# Patient Record
Sex: MALE | Race: WHITE | NOT HISPANIC OR LATINO | Employment: OTHER | URBAN - METROPOLITAN AREA
[De-identification: names, ages, dates, MRNs, and addresses within clinical notes are randomized per-mention and may not be internally consistent; named-entity substitution may affect disease eponyms.]

---

## 2017-01-11 ENCOUNTER — TELEPHONE (OUTPATIENT)
Dept: GASTROENTEROLOGY | Facility: CLINIC | Age: 72
End: 2017-01-11

## 2017-01-11 NOTE — TELEPHONE ENCOUNTER
Called pt and advised per Dr Prakash that the bx from his egd looked ok.  The colon polyps that were removed were not cancerous and were not precancerous.  Advised he wants him to f/u to discuss possibly having a capsule endoscopy to eval the small bowel for gi bleeding.  Pt verb understanding and reports he currently has an appt on 02/22/2017 and is asking if that is soon enough.  Advised would send message to Dr Prakash.

## 2017-01-11 NOTE — TELEPHONE ENCOUNTER
----- Message from Anil Prakash MD sent at 1/4/2017  7:24 AM EST -----  Tell him that the biopsies from his EGD looked OK. The colon polyps that were removed were not cancerous and not precancerous. Make sure that he is scheduled to see me in the office to discuss possibly having a Capsule Endoscopy to evaluate the small bowel for GI bleeding of obscure etiology. thx.

## 2017-01-12 NOTE — TELEPHONE ENCOUNTER
Per Dr Prakash- Seeing him on 2/22/17 is fine since his H/H is going up while on the iron pills. Have him come by the office about one week prior to the 2/22/17 office visit and have him get a CBC done to again compare to the previous CBC. Roro

## 2017-01-12 NOTE — TELEPHONE ENCOUNTER
Called pt and spoke with pt's wife and advised per Dr Prakash that seeing him on 02/22/2017 is fine since his h/h is going up while on the iron pills.  He would like him to come by the office about one wk prior to 02/22 and get a cbc done to again compare to the previous cbc.  Pt's wife verb understanding and states she will have her  call for a lab time.

## 2017-01-18 ENCOUNTER — OFFICE VISIT (OUTPATIENT)
Dept: ORTHOPEDIC SURGERY | Facility: CLINIC | Age: 72
End: 2017-01-18

## 2017-01-18 VITALS — BODY MASS INDEX: 37.47 KG/M2 | HEIGHT: 69 IN | WEIGHT: 253 LBS

## 2017-01-18 DIAGNOSIS — M48.061 SPINAL STENOSIS OF LUMBAR REGION: ICD-10-CM

## 2017-01-18 DIAGNOSIS — M54.5 CHRONIC LOW BACK PAIN, UNSPECIFIED BACK PAIN LATERALITY, WITH SCIATICA PRESENCE UNSPECIFIED: ICD-10-CM

## 2017-01-18 DIAGNOSIS — G89.29 CHRONIC LOW BACK PAIN, UNSPECIFIED BACK PAIN LATERALITY, WITH SCIATICA PRESENCE UNSPECIFIED: ICD-10-CM

## 2017-01-18 DIAGNOSIS — M54.50 LUMBAR SPINE PAIN: Primary | ICD-10-CM

## 2017-01-18 PROCEDURE — 72100 X-RAY EXAM L-S SPINE 2/3 VWS: CPT | Performed by: ORTHOPAEDIC SURGERY

## 2017-01-18 PROCEDURE — 99204 OFFICE O/P NEW MOD 45 MIN: CPT | Performed by: ORTHOPAEDIC SURGERY

## 2017-01-18 NOTE — LETTER
January 18, 2017     Maggy Dinh MD  [address] [address]    Patient: Danyel Dash   YOB: 1945   Date of Visit: 1/18/2017       Dear Dr. Allegra MD:    Thank you for referring Danyel Dash to me for evaluation. Below are the relevant portions of my assessment and plan of care.    If you have questions, please do not hesitate to call me. I look forward to following Danyel along with you.         Sincerely,        Danny Ya MD        CC: No Recipients  Danny Ya MD  1/18/2017  2:39 PM  Signed  New patient or new problem visit    Chief Complaint   Patient presents with   • Lumbar Spine - Establish Care    he complains of chronic back pain which began his back and hip pain still a bit of hip pain but not so much as before moderate aching in these had no treatment    HPI: The above    PFSH: See chart- reviewed    Review of Systems   Constitutional: Negative for chills, fever and unexpected weight change.   HENT: Negative.  Negative for trouble swallowing and voice change.    Eyes: Negative.  Negative for visual disturbance.   Respiratory: Negative.  Negative for cough and shortness of breath.    Cardiovascular: Negative.  Negative for chest pain and leg swelling.   Gastrointestinal: Negative.  Negative for abdominal pain, nausea and vomiting.   Endocrine: Negative.  Negative for cold intolerance and heat intolerance.   Genitourinary: Negative.  Negative for difficulty urinating, frequency and urgency.   Musculoskeletal: Negative.    Skin: Negative.  Negative for rash and wound.   Allergic/Immunologic: Negative.  Negative for immunocompromised state.   Neurological: Negative.  Negative for numbness.   Hematological: Negative.    Psychiatric/Behavioral: Negative for dysphoric mood. The patient is not nervous/anxious.        PE: Constitutional: Vital signs above-noted.  Awake, alert and oriented    Psychiatric: Affect and insight do not appear grossly  disturbed.    Pulmonary: Breathing is unlabored, color is good.    Skin: Warm, dry and normal turgor    Cardiac:  pedal pulses intact.  No edema.    Eyesight and hearing appear adequate for examination purposes      Musculoskeletal:  There is minimal tenderness to percussion and palpation of the spine. Motion appears undisturbed.  Posture is unremarkable to coronal and sagittal inspection.    The skin about the area is intact.  There is no palpable or visible deformity.  There is no local spasm.       Neurologic:   Reflexes are 2+ and symmetrical in the patellae absent in the Achilles.   Motor function is undisturbed in quadriceps, EHL, and gastrocnemius      Sensation appears symmetrically intact to light touch   .  In the bilateral lower extremities there is no evidence of atrophy.   Clonus is absent..  Gait appears undisturbed.  SLR test negative      MEDICAL DECISION MAKING    XRAY: Two-view x-rays of lumbar spine show multilevel degenerative change but well-maintained lordosis and posture in the coronal plane as well.  Some calcification in the abdominal aorta.  Comparison films are unremarkable.    Other: n/a    Impression: lumbar disc degeneration, lumbar stenosis    Plan: MRI then [facility]

## 2017-01-18 NOTE — MR AVS SNAPSHOT
[facility]  [phone]                    Danyel Dash   1/18/2017 1:30 PM   Office Visit    Dept Phone:  [phone]   Encounter #:  [id number]    Provider:  Danny Ya MD   Department:  [facility]                Your Full Care Plan              Your Updated Medication List          This list is accurate as of: 1/18/17  2:29 PM.  Always use your most recent med list.                amLODIPine 10 MG tablet   Commonly known as:  NORVASC   Take 1 tablet by mouth Daily.       aspirin 81 MG EC tablet       Blood Gluc Meter Disp-Strips device   Pt to monitor blood glucose two times daily       buPROPion  MG 24 hr tablet   Commonly known as:  WELLBUTRIN XL   Take 1 tablet by mouth Daily.       Iron (Ferrous Gluconate) 256 (28 FE) MG tablet       lisinopril 10 MG tablet   Commonly known as:  PRINIVIL,ZESTRIL   Take 1 tablet by mouth Daily.       omeprazole 20 MG capsule   Commonly known as:  priLOSEC   Take 1 capsule by mouth Daily.       pioglitazone 45 MG tablet   Commonly known as:  ACTOS   Take 1 tablet by mouth Daily.       tadalafil 20 MG tablet   Commonly known as:  CIALIS   Take 1 tablet by mouth Daily As Needed for erectile dysfunction.       VERAMYST 27.5 MCG/SPRAY nasal spray   Generic drug:  fluticasone       Vitamin B Complex tablet       Vitamin D-3 1000 UNITS capsule               You Were Diagnosed With        Codes Comments    Lumbar spine pain    -  Primary ICD-10-CM: M54.5  ICD-9-CM: 724.2       Instructions     None    Patient Instructions History      Upcoming Appointments     Visit Type Date Time Department    OFFICE VISIT 1/18/2017  1:30 PM [facility]    LAB 2/13/2017 10:00 AM [facility]    FOLLOW UP 2/22/2017  3:00 PM [facility]      MyChart Signup     Our records indicate that you have an active [facility] account.    You can view your  "After Visit Summary by going to [employer] and logging in with your [employer] username and password.  If you don't have a [employer] username and password but a parent or guardian has access to your record, the parent or guardian should login with their own [employer] username and password and access your record to view the After Visit Summary.    If you have questions, you can email [email or web address] or call [phone] to talk to our [employer] staff.  Remember, [employer] is NOT to be used for urgent needs.  For medical emergencies, dial 911.               Other Info from Your Visit           Your Appointments     Feb 13, 2017 10:00 AM EST   Lab with [facility] (--)    [address] [address]   [phone]            Feb 22, 2017  3:00 PM EST   Follow Up with Anil Prakash MD   [facility]STRO[facility] (--)    [address] [place] [address]   [phone]           Arrive 15 minutes prior to appointment.              Allergies     No Known Allergies      Reason for Visit     Lumbar Spine - Establish Care           Vital Signs     Height Weight Body Mass Index Smoking Status          69\" (175.3 cm) 253 lb (115 kg) 37.36 kg/m2 Former Smoker        Problems and Diagnoses Noted     Low back pain    -  Primary        "

## 2017-01-18 NOTE — PROGRESS NOTES
New patient or new problem visit    Chief Complaint   Patient presents with   • Lumbar Spine - Establish Care    he complains of chronic back pain which began his back and hip pain still a bit of hip pain but not so much as before moderate aching in these had no treatment    HPI: The above    PFSH: See chart- reviewed    Review of Systems   Constitutional: Negative for chills, fever and unexpected weight change.   HENT: Negative.  Negative for trouble swallowing and voice change.    Eyes: Negative.  Negative for visual disturbance.   Respiratory: Negative.  Negative for cough and shortness of breath.    Cardiovascular: Negative.  Negative for chest pain and leg swelling.   Gastrointestinal: Negative.  Negative for abdominal pain, nausea and vomiting.   Endocrine: Negative.  Negative for cold intolerance and heat intolerance.   Genitourinary: Negative.  Negative for difficulty urinating, frequency and urgency.   Musculoskeletal: Negative.    Skin: Negative.  Negative for rash and wound.   Allergic/Immunologic: Negative.  Negative for immunocompromised state.   Neurological: Negative.  Negative for numbness.   Hematological: Negative.    Psychiatric/Behavioral: Negative for dysphoric mood. The patient is not nervous/anxious.        PE: Constitutional: Vital signs above-noted.  Awake, alert and oriented    Psychiatric: Affect and insight do not appear grossly disturbed.    Pulmonary: Breathing is unlabored, color is good.    Skin: Warm, dry and normal turgor    Cardiac:  pedal pulses intact.  No edema.    Eyesight and hearing appear adequate for examination purposes      Musculoskeletal:  There is minimal tenderness to percussion and palpation of the spine. Motion appears undisturbed.  Posture is unremarkable to coronal and sagittal inspection.    The skin about the area is intact.  There is no palpable or visible deformity.  There is no local spasm.       Neurologic:   Reflexes are 2+ and symmetrical in the patellae  absent in the Achilles.   Motor function is undisturbed in quadriceps, EHL, and gastrocnemius      Sensation appears symmetrically intact to light touch   .  In the bilateral lower extremities there is no evidence of atrophy.   Clonus is absent..  Gait appears undisturbed.  SLR test negative      MEDICAL DECISION MAKING    XRAY: Two-view x-rays of lumbar spine show multilevel degenerative change but well-maintained lordosis and posture in the coronal plane as well.  Some calcification in the abdominal aorta.  Comparison films are unremarkable.    Other: n/a    Impression: lumbar disc degeneration, lumbar stenosis    Plan: MRI then [facility]

## 2017-02-10 ENCOUNTER — HOSPITAL ENCOUNTER (OUTPATIENT)
Dept: MRI IMAGING | Facility: HOSPITAL | Age: 72
Discharge: HOME | End: 2017-02-10
Attending: ORTHOPAEDIC SURGERY | Admitting: ORTHOPAEDIC SURGERY

## 2017-02-10 DIAGNOSIS — M54.5 CHRONIC LOW BACK PAIN, UNSPECIFIED BACK PAIN LATERALITY, WITH SCIATICA PRESENCE UNSPECIFIED: ICD-10-CM

## 2017-02-10 DIAGNOSIS — G89.29 CHRONIC LOW BACK PAIN, UNSPECIFIED BACK PAIN LATERALITY, WITH SCIATICA PRESENCE UNSPECIFIED: ICD-10-CM

## 2017-02-10 PROCEDURE — 72148 MRI LUMBAR SPINE W/O DYE: CPT

## 2017-02-13 ENCOUNTER — TELEPHONE (OUTPATIENT)
Dept: GASTROENTEROLOGY | Facility: CLINIC | Age: 72
End: 2017-02-13

## 2017-02-13 DIAGNOSIS — D64.9 ANEMIA, UNSPECIFIED TYPE: Primary | ICD-10-CM

## 2017-02-13 LAB
BASOPHILS # BLD AUTO: 0.04 10*3/MM3 (ref 0–0.2)
BASOPHILS NFR BLD AUTO: 0.6 % (ref 0–1.5)
EOSINOPHIL # BLD AUTO: 0.16 10*3/MM3 (ref 0–0.7)
EOSINOPHIL NFR BLD AUTO: 2.5 % (ref 0.3–6.2)
ERYTHROCYTE [DISTWIDTH] IN BLOOD BY AUTOMATED COUNT: 19.5 % (ref 11.5–14.5)
HCT VFR BLD AUTO: 41.3 % (ref 40.4–52.2)
HGB BLD-MCNC: 12.5 G/DL (ref 13.7–17.6)
IMM GRANULOCYTES # BLD: 0.04 10*3/MM3 (ref 0–0.03)
IMM GRANULOCYTES NFR BLD: 0.6 % (ref 0–0.5)
LYMPHOCYTES # BLD AUTO: 1.36 10*3/MM3 (ref 0.9–4.8)
LYMPHOCYTES NFR BLD AUTO: 20.9 % (ref 19.6–45.3)
MCH RBC QN AUTO: 28.2 PG (ref 27–32.7)
MCHC RBC AUTO-ENTMCNC: 30.3 G/DL (ref 32.6–36.4)
MCV RBC AUTO: 93 FL (ref 79.8–96.2)
MONOCYTES # BLD AUTO: 0.48 10*3/MM3 (ref 0.2–1.2)
MONOCYTES NFR BLD AUTO: 7.4 % (ref 5–12)
NEUTROPHILS # BLD AUTO: 4.42 10*3/MM3 (ref 1.9–8.1)
NEUTROPHILS NFR BLD AUTO: 68 % (ref 42.7–76)
PLATELET # BLD AUTO: 406 10*3/MM3 (ref 140–500)
RBC # BLD AUTO: 4.44 10*6/MM3 (ref 4.6–6)
WBC # BLD AUTO: 6.5 10*3/MM3 (ref 4.5–10.7)

## 2017-02-14 NOTE — TELEPHONE ENCOUNTER
Tell him that his CBC looks even better with a Hgb of 12.5. Continue the iron pills. We can talk when I see him in the office. santiago

## 2017-02-14 NOTE — TELEPHONE ENCOUNTER
Call to pt.  Advise per Dr Prakash that his CBC looks even better with a Hgb of 12.5.  Continue the iron pills.  Dr Prakash will talk with pt when seen in office.  Pt verb understanding - has f/u appt 2/22/17.

## 2017-02-17 ENCOUNTER — TELEPHONE (OUTPATIENT)
Dept: ORTHOPEDIC SURGERY | Facility: CLINIC | Age: 72
End: 2017-02-17

## 2017-02-17 NOTE — TELEPHONE ENCOUNTER
----- Message from Danny Ya MD sent at 2/16/2017 10:58 AM EST -----  Let him know the MRI scan of the lumbar spine shows wear and tear changes, and mild nerve compression.  Nothing for which I would recommend surgery but epidural injections can help and we discussed these in the office.  Please schedule

## 2017-02-21 NOTE — TELEPHONE ENCOUNTER
Spoke with pt and he was informed of his results.  He already has a follow up appt scheduled with NITA which he request to keep so he could discuss the results and options with him.

## 2017-02-22 ENCOUNTER — OFFICE VISIT (OUTPATIENT)
Dept: GASTROENTEROLOGY | Facility: CLINIC | Age: 72
End: 2017-02-22

## 2017-02-22 VITALS
DIASTOLIC BLOOD PRESSURE: 60 MMHG | WEIGHT: 252 LBS | BODY MASS INDEX: 37.33 KG/M2 | HEIGHT: 69 IN | SYSTOLIC BLOOD PRESSURE: 132 MMHG

## 2017-02-22 DIAGNOSIS — D50.0 IRON DEFICIENCY ANEMIA DUE TO CHRONIC BLOOD LOSS: ICD-10-CM

## 2017-02-22 DIAGNOSIS — K92.2 GASTROINTESTINAL HEMORRHAGE, UNSPECIFIED GASTROINTESTINAL HEMORRHAGE TYPE: Primary | ICD-10-CM

## 2017-02-22 PROCEDURE — 99213 OFFICE O/P EST LOW 20 MIN: CPT | Performed by: INTERNAL MEDICINE

## 2017-02-22 NOTE — PROGRESS NOTES
Chief Complaint   Patient presents with   • Follow-up     post scope       History of Present Illness:We reviewed the results of the EGD and Colonoscopy done in 12/16 and the labs drawn earlier this month. He takkes the iron gluconate + vitamin C one bid. He feels better. No rectal bleeding or melena. No abdominal or chest pain.  No nausea or vomting. No heartburn. He is on OMeprazole 40 mg/day.  NO diarrhea or constipation. Weight is increasing.     Past Medical History   Diagnosis Date   • Anemia    • Coronary artery disease      rheumatic fever at age 7   • Diabetes mellitus    • Hypertension        Past Surgical History   Procedure Laterality Date   • Knee arthroscopy Right    • Shoulder arthroscopy Right    • Tonsillectomy     • Endoscopy N/A 12/27/2016     Procedure: ESOPHAGOGASTRODUODENOSCOPY WITH BIOPSIES;  Surgeon: Anil Prakash MD;  Location: [facility] ENDOSCOPY;  Service:    • Colonoscopy N/A 12/27/2016     Procedure: COLONOSCOPY TO CECUM AND TERMINAL ILEUM WITH COLD BIOPSY POLYPECTOMIES;  Surgeon: Anil Prakash MD;  Location: [facility] ENDOSCOPY;  Service:          Current Outpatient Prescriptions:   •  amLODIPine (NORVASC) 10 MG tablet, Take 1 tablet by mouth Daily., Disp: 90 tablet, Rfl: 1  •  aspirin 81 MG EC tablet, Take 81 mg by mouth Daily., Disp: , Rfl:   •  B Complex Vitamins (VITAMIN B COMPLEX) tablet, Take 1 tablet by mouth Daily., Disp: , Rfl:   •  Blood Gluc Meter Disp-Strips device, Pt to monitor blood glucose two times daily, Disp: 1 each, Rfl: 0  •  buPROPion XL (WELLBUTRIN XL) 300 MG 24 hr tablet, Take 1 tablet by mouth Daily., Disp: 90 tablet, Rfl: 1  •  Cholecalciferol (VITAMIN D-3) 1000 UNITS capsule, Take 1,000 Units by mouth Daily., Disp: , Rfl:   •  fluticasone (VERAMYST) 27.5 MCG/SPRAY nasal spray, 2 sprays into each nostril Daily., Disp: , Rfl:   •  Iron, Ferrous Gluconate, 256 (28 FE) MG tablet, Take 1 tablet by mouth daily., Disp: , Rfl:   •  lisinopril (PRINIVIL,ZESTRIL) 10 MG tablet,  Take 1 tablet by mouth Daily., Disp: 90 tablet, Rfl: 1  •  omeprazole (priLOSEC) 20 MG capsule, Take 1 capsule by mouth Daily., Disp: 90 capsule, Rfl: 1  •  pioglitazone (ACTOS) 45 MG tablet, Take 1 tablet by mouth Daily., Disp: 90 tablet, Rfl: 1  •  tadalafil (CIALIS) 20 MG tablet, Take 1 tablet by mouth Daily As Needed for erectile dysfunction., Disp: 90 tablet, Rfl: 1    No Known Allergies    Family History   Problem Relation Age of Onset   • Diverticulosis Father        Social History     Social History   • Marital status:      Spouse name: N/A   • Number of children: N/A   • Years of education: N/A     Occupational History   • Not on file.     Social History Main Topics   • Smoking status: Former Smoker   • Smokeless tobacco: Not on file   • Alcohol use No   • Drug use: No   • Sexual activity: Not on file     Other Topics Concern   • Not on file     Social History Narrative       Review of Systems   All other systems reviewed and are negative.      Vitals:    02/22/17 1442   BP: 132/60       Physical Exam   Constitutional: He is oriented to person, place, and time. He appears well-developed and well-nourished. No distress.   HENT:   Head: Normocephalic and atraumatic. Hair is normal.   Right Ear: Hearing, tympanic membrane, external ear and ear canal normal.   Left Ear: Hearing, tympanic membrane, external ear and ear canal normal.   Nose: No sinus tenderness or nasal deformity.   Mouth/Throat: Uvula is midline, oropharynx is clear and moist and mucous membranes are normal. No oral lesions. No uvula swelling.   Eyes: Conjunctivae, EOM and lids are normal. Pupils are equal, round, and reactive to light. Right eye exhibits no discharge. Left eye exhibits no discharge. No scleral icterus. Right eye exhibits normal extraocular motion and no nystagmus. Left eye exhibits normal extraocular motion and no nystagmus.   Neck: Normal range of motion. Neck supple. No JVD present. No thyromegaly present.    Cardiovascular: Normal rate, regular rhythm, normal heart sounds, intact distal pulses and normal pulses.  Exam reveals no gallop.    No murmur heard.  Pulmonary/Chest: Effort normal and breath sounds normal. No respiratory distress. He has no wheezes. He has no rales. He exhibits no tenderness.   Abdominal: Soft. Bowel sounds are normal. He exhibits no distension and no mass. There is no tenderness. There is no guarding. No hernia.   Genitourinary: Rectal exam shows guaiac positive stool.   Genitourinary Comments: NOrmal anorectal exam.   Musculoskeletal: Normal range of motion. He exhibits no edema, tenderness or deformity.   Lymphadenopathy:     He has no cervical adenopathy.   Neurological: He is alert and oriented to person, place, and time. He has normal reflexes. He displays normal reflexes. No cranial nerve deficit. He exhibits normal muscle tone. Coordination normal.   Skin: Skin is warm and dry. No rash noted. He is not diaphoretic.   Psychiatric: He has a normal mood and affect. His behavior is normal. Judgment and thought content normal.   Vitals reviewed.      Danyel was seen today for follow-up.    Diagnoses and all orders for this visit:    Gastrointestinal hemorrhage, unspecified gastrointestinal hemorrhage type  -     Endoscopy, GI With Capsule    Iron deficiency anemia due to chronic blood loss       Assessment:  1) Iron deficiency anemia  2) GERD  3) GI bleeding of obscure etiology. He is still heme positive today on rectal exxam.    REcommendation:  1) Continue the iron and the Omeprazole  2) Lose weight.  3) Let's see if his insurance will pay for a Capsule Endoscopy.  4) Lets get monthly CBC's  5) f/u 8 weeks.       Return in about 8 weeks (around 4/19/2017).

## 2017-02-28 ENCOUNTER — OFFICE VISIT (OUTPATIENT)
Dept: ORTHOPEDIC SURGERY | Facility: CLINIC | Age: 72
End: 2017-02-28

## 2017-02-28 DIAGNOSIS — M48.061 SPINAL STENOSIS OF LUMBAR REGION: Primary | ICD-10-CM

## 2017-02-28 PROCEDURE — 99213 OFFICE O/P EST LOW 20 MIN: CPT | Performed by: ORTHOPAEDIC SURGERY

## 2017-02-28 NOTE — PROGRESS NOTES
He complains of back and right hip pain which is failed to improve conservative care.  It's moderate sometimes severe activity related and did not improve with time and exercises.  No balance difficulties bowel or bladder complaints.  On exam good strength and good sensation intact patellar reflexes absent Achilles reflexes.  I reviewed his MRI scan he has stenotic changes not dramatic but certainly enough to be symptomatic.  We'll schedule epidural injections I will see him back as needed

## 2017-03-22 DIAGNOSIS — D64.9 ANEMIA, UNSPECIFIED TYPE: Primary | ICD-10-CM

## 2017-03-22 LAB
BASOPHILS # BLD AUTO: 0.01 10*3/MM3 (ref 0–0.2)
BASOPHILS NFR BLD AUTO: 0.2 % (ref 0–1.5)
EOSINOPHIL # BLD AUTO: 0.05 10*3/MM3 (ref 0–0.7)
EOSINOPHIL NFR BLD AUTO: 1 % (ref 0.3–6.2)
ERYTHROCYTE [DISTWIDTH] IN BLOOD BY AUTOMATED COUNT: 14.9 % (ref 11.5–14.5)
HCT VFR BLD AUTO: 44 % (ref 40.4–52.2)
HGB BLD-MCNC: 14 G/DL (ref 13.7–17.6)
IMM GRANULOCYTES # BLD: 0 10*3/MM3 (ref 0–0.03)
IMM GRANULOCYTES NFR BLD: 0 % (ref 0–0.5)
LYMPHOCYTES # BLD AUTO: 1.08 10*3/MM3 (ref 0.9–4.8)
LYMPHOCYTES NFR BLD AUTO: 22.4 % (ref 19.6–45.3)
MCH RBC QN AUTO: 29.7 PG (ref 27–32.7)
MCHC RBC AUTO-ENTMCNC: 31.8 G/DL (ref 32.6–36.4)
MCV RBC AUTO: 93.4 FL (ref 79.8–96.2)
MONOCYTES # BLD AUTO: 0.31 10*3/MM3 (ref 0.2–1.2)
MONOCYTES NFR BLD AUTO: 6.4 % (ref 5–12)
NEUTROPHILS # BLD AUTO: 3.37 10*3/MM3 (ref 1.9–8.1)
NEUTROPHILS NFR BLD AUTO: 70 % (ref 42.7–76)
PLATELET # BLD AUTO: 389 10*3/MM3 (ref 140–500)
RBC # BLD AUTO: 4.71 10*6/MM3 (ref 4.6–6)
WBC # BLD AUTO: 4.82 10*3/MM3 (ref 4.5–10.7)

## 2017-03-23 ENCOUNTER — TRANSCRIBE ORDERS (OUTPATIENT)
Dept: PAIN MEDICINE | Facility: HOSPITAL | Age: 72
End: 2017-03-23

## 2017-03-23 ENCOUNTER — HOSPITAL ENCOUNTER (OUTPATIENT)
Dept: PAIN MEDICINE | Facility: HOSPITAL | Age: 72
Discharge: HOME | End: 2017-03-23
Attending: ORTHOPAEDIC SURGERY | Admitting: ORTHOPAEDIC SURGERY

## 2017-03-23 ENCOUNTER — ANESTHESIA (OUTPATIENT)
Dept: PAIN MEDICINE | Facility: HOSPITAL | Age: 72
End: 2017-03-23

## 2017-03-23 ENCOUNTER — HOSPITAL ENCOUNTER (OUTPATIENT)
Dept: GENERAL RADIOLOGY | Facility: HOSPITAL | Age: 72
Discharge: HOME | End: 2017-03-23

## 2017-03-23 ENCOUNTER — ANESTHESIA EVENT (OUTPATIENT)
Dept: PAIN MEDICINE | Facility: HOSPITAL | Age: 72
End: 2017-03-23

## 2017-03-23 VITALS
DIASTOLIC BLOOD PRESSURE: 81 MMHG | SYSTOLIC BLOOD PRESSURE: 146 MMHG | HEIGHT: 69 IN | RESPIRATION RATE: 16 BRPM | TEMPERATURE: 97.8 F | OXYGEN SATURATION: 94 % | HEART RATE: 68 BPM

## 2017-03-23 DIAGNOSIS — R52 PAIN: ICD-10-CM

## 2017-03-23 DIAGNOSIS — M48.061 SPINAL STENOSIS OF LUMBAR REGION: Primary | ICD-10-CM

## 2017-03-23 DIAGNOSIS — M48.061 SPINAL STENOSIS OF LUMBAR REGION: ICD-10-CM

## 2017-03-23 PROCEDURE — 25010000002 METHYLPREDNISOLONE PER 80 MG: Performed by: ANESTHESIOLOGY

## 2017-03-23 PROCEDURE — 77003 FLUOROGUIDE FOR SPINE INJECT: CPT

## 2017-03-23 PROCEDURE — C1755 CATHETER, INTRASPINAL: HCPCS

## 2017-03-23 RX ORDER — LIDOCAINE HYDROCHLORIDE 10 MG/ML
1 INJECTION, SOLUTION INFILTRATION; PERINEURAL ONCE
Status: DISCONTINUED | OUTPATIENT
Start: 2017-03-23 | End: 2017-03-24 | Stop reason: HOSPADM

## 2017-03-23 RX ORDER — FLUTICASONE PROPIONATE 50 MCG
2 SPRAY, SUSPENSION (ML) NASAL DAILY
COMMUNITY
End: 2017-10-06

## 2017-03-23 RX ORDER — FENTANYL CITRATE 50 UG/ML
50 INJECTION, SOLUTION INTRAMUSCULAR; INTRAVENOUS
Status: DISCONTINUED | OUTPATIENT
Start: 2017-03-23 | End: 2017-03-24 | Stop reason: HOSPADM

## 2017-03-23 RX ORDER — BUPIVACAINE HYDROCHLORIDE 2.5 MG/ML
30 INJECTION, SOLUTION EPIDURAL; INFILTRATION; INTRACAUDAL ONCE
Status: DISCONTINUED | OUTPATIENT
Start: 2017-03-23 | End: 2017-03-24 | Stop reason: HOSPADM

## 2017-03-23 RX ORDER — SODIUM CHLORIDE 0.9 % (FLUSH) 0.9 %
1-10 SYRINGE (ML) INJECTION AS NEEDED
Status: DISCONTINUED | OUTPATIENT
Start: 2017-03-23 | End: 2017-03-24 | Stop reason: HOSPADM

## 2017-03-23 RX ORDER — LIDOCAINE HYDROCHLORIDE 10 MG/ML
0.5 INJECTION, SOLUTION INFILTRATION; PERINEURAL ONCE AS NEEDED
Status: CANCELLED | OUTPATIENT
Start: 2017-03-23

## 2017-03-23 RX ORDER — METHYLPREDNISOLONE ACETATE 80 MG/ML
80 INJECTION, SUSPENSION INTRA-ARTICULAR; INTRALESIONAL; INTRAMUSCULAR; SOFT TISSUE ONCE
Status: COMPLETED | OUTPATIENT
Start: 2017-03-23 | End: 2017-03-23

## 2017-03-23 RX ORDER — MIDAZOLAM HYDROCHLORIDE 1 MG/ML
1 INJECTION INTRAMUSCULAR; INTRAVENOUS
Status: DISCONTINUED | OUTPATIENT
Start: 2017-03-23 | End: 2017-03-24 | Stop reason: HOSPADM

## 2017-03-23 RX ADMIN — METHYLPREDNISOLONE ACETATE 80 MG: 80 INJECTION, SUSPENSION INTRA-ARTICULAR; INTRALESIONAL; INTRAMUSCULAR; SOFT TISSUE at 10:28

## 2017-03-23 NOTE — ANESTHESIA PROCEDURE NOTES
PAIN Epidural block    Patient location during procedure: pain clinic  Indication:procedure for pain  Performed By  Anesthesiologist: LUIS EDUARDO WEBSTER  Preanesthetic Checklist  Completed: patient identified, site marked, surgical consent, pre-op evaluation, timeout performed, risks and benefits discussed and monitors and equipment checked  Additional Notes  Depomedrol - 80mg    Needle position confirmed by fluoroscopy; no contrast due to kidney function.    Diagnosis  Post-Op Diagnosis Codes:     * Lumbar radiculopathy (M54.16)     * Lumbar degenerative disc disease (M51.36)    Epidural Block Prep:  Pt Position:prone  Sterile Tech:cap, gloves, mask and sterile barrier  Prep:chlorhexidine gluconate and isopropyl alcohol  Monitoring:blood pressure monitoring, continuous pulse oximetry and EKG  Epidural Block Procedure:  Approach:right paramedian  Guidance: fluoroscopy  Location:lumbar  Level:4-5  Needle Type:Tuohy  Needle Gauge:20  Aspiration:negative  Medications:  Depomedrol:80 mg  Preservative Free Saline:2mL  Isovue:0mL    Post Assessment:  Post-procedure: bandaide.  Pt Tolerance:patient tolerated the procedure well with no apparent complications  Complications:no

## 2017-03-23 NOTE — H&P
[facility]    History and Physical    Patient Name: Danyel Dash  :  1945  MRN:  [id number]  Date of Admission: 3/23/2017    Subjective     Patient is a 71 y.o. male presents with chief complaint of chronic, intermitent, moderate, severe low back and hips: bilateral pain.  Onset of symptoms was gradual starting several months ago.  Symptoms are associated/aggravated by lifting, standing, straining or twisting. Symptoms improve with relaxation    The following portions of the patients history were reviewed and updated as appropriate: current medications, allergies, past medical history, past surgical history, past family history, past social history and problem list                Objective     Past Medical History:   Past Medical History:   Diagnosis Date   • Anemia    • Chronic kidney disease    • Coronary artery disease     rheumatic fever at age 7   • Diabetes mellitus    • Hypertension    • Low back pain      Past Surgical History:   Past Surgical History:   Procedure Laterality Date   • COLONOSCOPY N/A 2016    Procedure: COLONOSCOPY TO CECUM AND TERMINAL ILEUM WITH COLD BIOPSY POLYPECTOMIES;  Surgeon: Anil Prakash MD;  Location: [facility] ENDOSCOPY;  Service:    • ENDOSCOPY N/A 2016    Procedure: ESOPHAGOGASTRODUODENOSCOPY WITH BIOPSIES;  Surgeon: Anil Prakash MD;  Location: [facility] ENDOSCOPY;  Service:    • KNEE ARTHROSCOPY Right    • SHOULDER ARTHROSCOPY Right    • TONSILLECTOMY       Family History:   Family History   Problem Relation Age of Onset   • Diverticulosis Father      Social History:   Social History   Substance Use Topics   • Smoking status: Former Smoker   • Smokeless tobacco: None   • Alcohol use No       Vital Signs Range for the last 24 hours  Temperature: Temp:  [36.6 °C (97.8 °F)] 36.6 °C (97.8 °F)   Temp Source: Temp src: Oral   BP: BP: (182)/(85) 182/85   Pulse: Heart Rate:  [78] 78   Respirations: Resp:  [16] 16   SPO2: SpO2:  [96 %] 96 %   O2 Amount (l/min):     O2  "Devices O2 Device: room air   Weight:       Flowsheet Rows         First Filed Value    Admission Height  69\" (175.3 cm) Documented at 03/23/2017 1000    Admission Weight            --------------------------------------------------------------------------------    Current Outpatient Prescriptions   Medication Sig Dispense Refill   • amLODIPine (NORVASC) 10 MG tablet Take 1 tablet by mouth Daily. 90 tablet 1   • B Complex Vitamins (VITAMIN B COMPLEX) tablet Take 1 tablet by mouth Daily.     • Blood Gluc Meter Disp-Strips device Pt to monitor blood glucose two times daily 1 each 0   • Cholecalciferol (VITAMIN D-3) 1000 UNITS capsule Take 1,000 Units by mouth Daily.     • fluticasone (VERAMYST) 27.5 MCG/SPRAY nasal spray 2 sprays into each nostril Daily.     • Iron, Ferrous Gluconate, 256 (28 FE) MG tablet Take 1 tablet by mouth daily.     • lisinopril (PRINIVIL,ZESTRIL) 10 MG tablet Take 1 tablet by mouth Daily. 90 tablet 1   • omeprazole (priLOSEC) 20 MG capsule Take 1 capsule by mouth Daily. 90 capsule 1   • pioglitazone (ACTOS) 45 MG tablet Take 1 tablet by mouth Daily. 90 tablet 1   • tadalafil (CIALIS) 20 MG tablet Take 1 tablet by mouth Daily As Needed for erectile dysfunction. 90 tablet 1   • aspirin 81 MG EC tablet Take 81 mg by mouth Daily.     • buPROPion XL (WELLBUTRIN XL) 300 MG 24 hr tablet Take 1 tablet by mouth Daily. 90 tablet 1     Current Facility-Administered Medications   Medication Dose Route Frequency Provider Last Rate Last Dose   • bupivacaine PF (MARCAINE) 0.25 % injection 30 mL  30 mL Infiltration Once Ike Montenegro MD       • FentaNYL Citrate (PF) (SUBLIMAZE) injection 50 mcg  50 mcg Intravenous Q5 Min PRN Ike Montenegro MD       • iopamidol (ISOVUE-M 200) injection 41%  2 mL Injection Once in imaging Ike Montenegro MD       • lidocaine (XYLOCAINE) 1 % injection 1 mL  1 mL Intradermal Once Ike Montenegro MD       • methylPREDNISolone acetate (DEPO-medrol) " injection 80 mg  80 mg Intramuscular Once Ike Montenegro MD       • midazolam (VERSED) injection 1 mg  1 mg Intravenous Q5 Min PRN Ike Montenegro MD       • sodium chloride 0.9 % flush 1-10 mL  1-10 mL Intravenous PRN Ike Montenegro MD           --------------------------------------------------------------------------------  Assessment/Plan      Anesthesia Evaluation     Patient summary reviewed and Nursing notes reviewed   no history of anesthetic complications:  NPO Status: > 6 hours   Airway   Mallampati: II  TM distance: >3 FB  Neck ROM: full  Dental - normal exam     Pulmonary - negative pulmonary ROS and normal exam   Cardiovascular - normal exam    (+) hypertension, CAD,       Neuro/Psych- neuro exam normal  GI/Hepatic/Renal/Endo    (+)  GERD, chronic renal disease CRI, diabetes mellitus,     Musculoskeletal     (+) back pain, radiculopathy Left lower extremity and Right lower extremity  Abdominal  - normal exam   Substance History      OB/GYN          Other   (+) arthritis                            Diagnosis and Plan    Treatment Plan  ASA 2      Procedures: Lumbar Epidural Steroid Injection(LESI), With fluoroscopy,       Anesthetic plan and risks discussed with patient.          Diagnosis     * Lumbar radiculopathy [M54.16]     * Lumbar degenerative disc disease [M51.36]

## 2017-03-23 NOTE — PLAN OF CARE
Problem: Pain, Chronic (Adult)  Goal: Identify Related Risk Factors and Signs and Symptoms  Outcome: Unable to achieve outcome(s) by discharge Date Met:  03/23/17

## 2017-03-26 NOTE — PROGRESS NOTES
Tell him that his CBC on 3/22/17 was normal with a Hgb of 14.0 which is good. Let's continue with the plan. santiago

## 2017-03-30 ENCOUNTER — TELEPHONE (OUTPATIENT)
Dept: GASTROENTEROLOGY | Facility: CLINIC | Age: 72
End: 2017-03-30

## 2017-03-30 NOTE — TELEPHONE ENCOUNTER
Call to spouse, Lea (see [employer] auth of 1/18/17).  Advise per Dr Prakash that his CBC on 3/22/17 was  Normal with a Hgb of 14.0, which is good. Continue with the plan.  Spouse verb understanding.

## 2017-03-30 NOTE — TELEPHONE ENCOUNTER
----- Message from Anil Prakash MD sent at 3/26/2017  6:32 PM EDT -----  Tell him that his CBC on 3/22/17 was normal with a Hgb of 14.0 which is good. Let's continue with the plan. santiago

## 2017-04-19 ENCOUNTER — ANESTHESIA EVENT (OUTPATIENT)
Dept: PAIN MEDICINE | Facility: HOSPITAL | Age: 72
End: 2017-04-19

## 2017-04-19 ENCOUNTER — TELEPHONE (OUTPATIENT)
Dept: GASTROENTEROLOGY | Facility: CLINIC | Age: 72
End: 2017-04-19

## 2017-04-19 DIAGNOSIS — D50.9 IRON DEFICIENCY ANEMIA, UNSPECIFIED IRON DEFICIENCY ANEMIA TYPE: Primary | ICD-10-CM

## 2017-04-19 LAB
BASOPHILS # BLD AUTO: 0.03 10*3/MM3 (ref 0–0.2)
BASOPHILS NFR BLD AUTO: 0.5 % (ref 0–1.5)
EOSINOPHIL # BLD AUTO: 0.11 10*3/MM3 (ref 0–0.7)
EOSINOPHIL NFR BLD AUTO: 1.9 % (ref 0.3–6.2)
ERYTHROCYTE [DISTWIDTH] IN BLOOD BY AUTOMATED COUNT: 13.1 % (ref 11.5–14.5)
HCT VFR BLD AUTO: 42.9 % (ref 40.4–52.2)
HGB BLD-MCNC: 14 G/DL (ref 13.7–17.6)
IMM GRANULOCYTES # BLD: 0 10*3/MM3 (ref 0–0.03)
IMM GRANULOCYTES NFR BLD: 0 % (ref 0–0.5)
LYMPHOCYTES # BLD AUTO: 0.93 10*3/MM3 (ref 0.9–4.8)
LYMPHOCYTES NFR BLD AUTO: 15.8 % (ref 19.6–45.3)
MCH RBC QN AUTO: 30.4 PG (ref 27–32.7)
MCHC RBC AUTO-ENTMCNC: 32.6 G/DL (ref 32.6–36.4)
MCV RBC AUTO: 93.3 FL (ref 79.8–96.2)
MONOCYTES # BLD AUTO: 0.41 10*3/MM3 (ref 0.2–1.2)
MONOCYTES NFR BLD AUTO: 6.9 % (ref 5–12)
NEUTROPHILS # BLD AUTO: 4.42 10*3/MM3 (ref 1.9–8.1)
NEUTROPHILS NFR BLD AUTO: 74.9 % (ref 42.7–76)
PLATELET # BLD AUTO: 428 10*3/MM3 (ref 140–500)
RBC # BLD AUTO: 4.6 10*6/MM3 (ref 4.6–6)
WBC # BLD AUTO: 5.9 10*3/MM3 (ref 4.5–10.7)

## 2017-04-19 RX ORDER — LIDOCAINE HYDROCHLORIDE 10 MG/ML
1 INJECTION, SOLUTION INFILTRATION; PERINEURAL ONCE
Status: DISCONTINUED | OUTPATIENT
Start: 2017-04-19 | End: 2017-04-21 | Stop reason: HOSPADM

## 2017-04-19 RX ORDER — SODIUM CHLORIDE 0.9 % (FLUSH) 0.9 %
1-10 SYRINGE (ML) INJECTION AS NEEDED
Status: DISCONTINUED | OUTPATIENT
Start: 2017-04-19 | End: 2017-04-21 | Stop reason: HOSPADM

## 2017-04-19 RX ORDER — BUPIVACAINE HYDROCHLORIDE 2.5 MG/ML
30 INJECTION, SOLUTION EPIDURAL; INFILTRATION; INTRACAUDAL ONCE
Status: DISCONTINUED | OUTPATIENT
Start: 2017-04-19 | End: 2017-04-21 | Stop reason: HOSPADM

## 2017-04-19 RX ORDER — MIDAZOLAM HYDROCHLORIDE 1 MG/ML
1 INJECTION INTRAMUSCULAR; INTRAVENOUS
Status: DISCONTINUED | OUTPATIENT
Start: 2017-04-19 | End: 2017-04-21 | Stop reason: HOSPADM

## 2017-04-19 RX ORDER — METHYLPREDNISOLONE ACETATE 80 MG/ML
80 INJECTION, SUSPENSION INTRA-ARTICULAR; INTRALESIONAL; INTRAMUSCULAR; SOFT TISSUE ONCE
Status: COMPLETED | OUTPATIENT
Start: 2017-04-19 | End: 2017-04-20

## 2017-04-19 RX ORDER — FENTANYL CITRATE 50 UG/ML
50 INJECTION, SOLUTION INTRAMUSCULAR; INTRAVENOUS
Status: DISCONTINUED | OUTPATIENT
Start: 2017-04-19 | End: 2017-04-21 | Stop reason: HOSPADM

## 2017-04-19 NOTE — H&P
INTERVAL HISTORY:    The patient returns for another Lumbar epidural steroid injection 2 today.  They have received 95% improvement since their last injection with a pain level of 4 /10 at its worst recently.    Conservative measures tried in the interim   MP - 2  Lungs - clear  CV - rrr    Diagnosis:Lumbar Spinal Stenosis    Plan:  Lumbar epidural steroid injection under fluoroscopic guidance    I have encouraged them to continue:  1.  Physical therapy exercises at home as prescribed by physical therapy or from the pain clinic handout (given to the patient).  Continuation of these exercises every day, or multiple times per week, even when the patient has good pain relief, was stressed to the patient as a preventative measure to decrease the frequency and severity of future pain episodes.  2.  Continue pain medicines as already prescribed.  If patient not currently taking any, it is recommended to begin Acetaminophen 1000 mg po q 8 hours.  If other medicines containing Acetaminophen are currently prescribed, maintain daily dose at 3000mg.    3.  If they can tolerate NSAIDS, it is recommended to take Ibuprofen 600 mg po q 6 hours for 7 days during pain exacerbations.   Alternatively, they may substitute an NSAID of their choice (e.g. Aleve)  4.  Heat and ice to the affected area as tolerated for pain control.  It was discussed that heating pads can cause burns.  5.  Low impact exercise such as walking or water exercise was recommended to maintain overall health and aid in weight control.   6.  Follow up as needed for subsequent injections.  7.  Patient was counseled to abstain from tobacco products.

## 2017-04-19 NOTE — ANESTHESIA PROCEDURE NOTES
PAIN Epidural block    Patient location during procedure: pain clinic  Start Time: 4/20/2017 8:35 AM  Stop Time: 4/20/2017 8:43 AM  Indication:at surgeon's request and procedure for pain  Performed By  Anesthesiologist: KRISTIN GARCIA  Preanesthetic Checklist  Completed: patient identified, site marked, surgical consent, pre-op evaluation, timeout performed, IV checked, risks and benefits discussed and monitors and equipment checked  Additional Notes  Dx: Lumbar Spinal Stenosis  80 mg depomedrol in epid  Epidural Block Prep:  Pt Position:prone (prone)  Sterile Tech:cap, gloves, mask and sterile barrier  Prep:chlorhexidine gluconate and isopropyl alcohol  Monitoring:blood pressure monitoring, EKG and continuous pulse oximetry  Epidural Block Procedure:  Approach:midline  Guidance: fluoroscopy and c arm pa and lat and loss of resistance  Location:lumbar  Level:4-5 (interlaminar)  Needle Type:Db  Needle Gauge:20  Aspiration:negative  Medications:  Depomedrol:80 mg  Preservative Free Saline:3mL  Comments:No dye due to kidney pathology  Post Assessment:  Post-procedure: bandaid.  Pt Tolerance:patient tolerated the procedure well with no apparent complications  Complications:no

## 2017-04-20 ENCOUNTER — HOSPITAL ENCOUNTER (OUTPATIENT)
Dept: PAIN MEDICINE | Facility: HOSPITAL | Age: 72
Discharge: HOME | End: 2017-04-20
Admitting: ORTHOPAEDIC SURGERY

## 2017-04-20 ENCOUNTER — HOSPITAL ENCOUNTER (OUTPATIENT)
Dept: GENERAL RADIOLOGY | Facility: HOSPITAL | Age: 72
Discharge: HOME | End: 2017-04-20

## 2017-04-20 ENCOUNTER — ANESTHESIA (OUTPATIENT)
Dept: PAIN MEDICINE | Facility: HOSPITAL | Age: 72
End: 2017-04-20

## 2017-04-20 VITALS
SYSTOLIC BLOOD PRESSURE: 139 MMHG | RESPIRATION RATE: 16 BRPM | DIASTOLIC BLOOD PRESSURE: 74 MMHG | OXYGEN SATURATION: 96 % | HEART RATE: 74 BPM | TEMPERATURE: 98.1 F

## 2017-04-20 DIAGNOSIS — R52 PAIN: ICD-10-CM

## 2017-04-20 DIAGNOSIS — M48.061 SPINAL STENOSIS OF LUMBAR REGION: ICD-10-CM

## 2017-04-20 PROCEDURE — C1755 CATHETER, INTRASPINAL: HCPCS

## 2017-04-20 PROCEDURE — 25010000002 METHYLPREDNISOLONE PER 80 MG: Performed by: ANESTHESIOLOGY

## 2017-04-20 PROCEDURE — 77003 FLUOROGUIDE FOR SPINE INJECT: CPT

## 2017-04-20 RX ADMIN — METHYLPREDNISOLONE ACETATE 80 MG: 80 INJECTION, SUSPENSION INTRA-ARTICULAR; INTRALESIONAL; INTRAMUSCULAR; SOFT TISSUE at 08:41

## 2017-04-26 ENCOUNTER — OFFICE VISIT (OUTPATIENT)
Dept: GASTROENTEROLOGY | Facility: CLINIC | Age: 72
End: 2017-04-26

## 2017-04-26 VITALS
HEIGHT: 70 IN | BODY MASS INDEX: 34.01 KG/M2 | WEIGHT: 237.6 LBS | SYSTOLIC BLOOD PRESSURE: 118 MMHG | DIASTOLIC BLOOD PRESSURE: 64 MMHG

## 2017-04-26 DIAGNOSIS — D50.0 IRON DEFICIENCY ANEMIA DUE TO CHRONIC BLOOD LOSS: ICD-10-CM

## 2017-04-26 DIAGNOSIS — K21.00 GASTROESOPHAGEAL REFLUX DISEASE WITH ESOPHAGITIS: ICD-10-CM

## 2017-04-26 DIAGNOSIS — K92.2 GASTROINTESTINAL HEMORRHAGE, UNSPECIFIED GASTROINTESTINAL HEMORRHAGE TYPE: Primary | ICD-10-CM

## 2017-04-26 PROCEDURE — 99213 OFFICE O/P EST LOW 20 MIN: CPT | Performed by: INTERNAL MEDICINE

## 2017-04-26 NOTE — PROGRESS NOTES
Chief Complaint   Patient presents with   • GI Bleeding       History of Present Illness: We reviewed the lab results including the normal CBC done one week ago. His energy is back to normal. He is on iron pills one bid with vitamin C. No rectal bleeding. He occaisonally sees black stool. No abdominal or chest pain. No nausea or vomting. He is on one baby aspirin/day. He has not had a Capsule Endoscopy yet and doesn't know why. He is trying to lose weight and is losing weight at a rate of about one pound/week. No diarrhea, some constipation. We reviewed the results of the 12/16 EGD and Colonoscopy. No abdominal or chest pain.  No SOA. No ETOH x 25 yrs.    Past Medical History:   Diagnosis Date   • Anemia    • Chronic kidney disease    • Coronary artery disease     rheumatic fever at age 7   • Diabetes mellitus    • Hypertension    • Low back pain        Past Surgical History:   Procedure Laterality Date   • COLONOSCOPY N/A 12/27/2016    tics, IH, polyps   • ENDOSCOPY N/A 12/27/2016    LA Grade B reflux esophagitis, erythematous mucosa in stomach   • KNEE ARTHROSCOPY Right    • SHOULDER ARTHROSCOPY Right    • TONSILLECTOMY           Current Outpatient Prescriptions:   •  amLODIPine (NORVASC) 10 MG tablet, Take 1 tablet by mouth Daily., Disp: 90 tablet, Rfl: 1  •  aspirin 81 MG EC tablet, Take 81 mg by mouth Daily., Disp: , Rfl:   •  B Complex Vitamins (VITAMIN B COMPLEX) tablet, Take 1 tablet by mouth Daily., Disp: , Rfl:   •  Blood Gluc Meter Disp-Strips device, Pt to monitor blood glucose two times daily, Disp: 1 each, Rfl: 0  •  buPROPion XL (WELLBUTRIN XL) 300 MG 24 hr tablet, Take 1 tablet by mouth Daily., Disp: 90 tablet, Rfl: 1  •  Cholecalciferol (VITAMIN D-3) 1000 UNITS capsule, Take 1,000 Units by mouth Daily., Disp: , Rfl:   •  fluticasone (FLONASE ALLERGY RELIEF) 50 MCG/ACT nasal spray, 2 sprays into each nostril Daily., Disp: , Rfl:   •  Iron, Ferrous Gluconate, 256 (28 FE) MG tablet, Take 1 tablet by  mouth daily., Disp: , Rfl:   •  lisinopril (PRINIVIL,ZESTRIL) 10 MG tablet, Take 1 tablet by mouth Daily., Disp: 90 tablet, Rfl: 1  •  omeprazole (priLOSEC) 20 MG capsule, Take 1 capsule by mouth Daily., Disp: 90 capsule, Rfl: 1  •  pioglitazone (ACTOS) 45 MG tablet, Take 1 tablet by mouth Daily., Disp: 90 tablet, Rfl: 1  •  tadalafil (CIALIS) 20 MG tablet, Take 1 tablet by mouth Daily As Needed for erectile dysfunction., Disp: 90 tablet, Rfl: 1    No Known Allergies    Family History   Problem Relation Age of Onset   • Diverticulosis Father        Social History     Social History   • Marital status:      Spouse name: N/A   • Number of children: N/A   • Years of education: N/A     Occupational History   • Not on file.     Social History Main Topics   • Smoking status: Former Smoker   • Smokeless tobacco: Not on file   • Alcohol use No   • Drug use: No   • Sexual activity: Not on file     Other Topics Concern   • Not on file     Social History Narrative       Review of Systems   All other systems reviewed and are negative.      Vitals:    04/26/17 1300   BP: 118/64       Physical Exam   Constitutional: He is oriented to person, place, and time. He appears well-developed and well-nourished. No distress.   HENT:   Head: Normocephalic and atraumatic. Hair is normal.   Right Ear: Hearing, tympanic membrane, external ear and ear canal normal.   Left Ear: Hearing, tympanic membrane, external ear and ear canal normal.   Nose: No sinus tenderness or nasal deformity.   Mouth/Throat: Uvula is midline, oropharynx is clear and moist and mucous membranes are normal. No oral lesions. No uvula swelling.   Eyes: Conjunctivae, EOM and lids are normal. Pupils are equal, round, and reactive to light. Right eye exhibits no discharge. Left eye exhibits no discharge. No scleral icterus. Right eye exhibits normal extraocular motion and no nystagmus. Left eye exhibits normal extraocular motion and no nystagmus.   Fundoscopic exam:        The right eye shows red reflex.        The left eye shows red reflex.   Neck: Normal range of motion. Neck supple. No JVD present. No tracheal deviation present. No thyromegaly present.   Cardiovascular: Normal rate, regular rhythm, normal heart sounds, intact distal pulses and normal pulses.  Exam reveals no gallop.    No murmur heard.  Pulmonary/Chest: Effort normal and breath sounds normal. No respiratory distress. He has no wheezes. He has no rales. He exhibits no tenderness.   Abdominal: Soft. Bowel sounds are normal. He exhibits no distension and no mass. There is no tenderness. There is no guarding. No hernia.   Genitourinary: Prostate normal. Rectal exam shows guaiac positive stool.   Musculoskeletal: Normal range of motion. He exhibits no edema, tenderness or deformity.   Lymphadenopathy:     He has no cervical adenopathy.   Neurological: He is alert and oriented to person, place, and time. He has normal reflexes. He displays normal reflexes. No cranial nerve deficit. He exhibits normal muscle tone. Coordination normal.   Skin: Skin is warm and dry. No rash noted. He is not diaphoretic.   Psychiatric: He has a normal mood and affect. His behavior is normal. Judgment and thought content normal.   Nursing note and vitals reviewed.      Danyel was seen today for gi bleeding.    Diagnoses and all orders for this visit:    Gastrointestinal hemorrhage, unspecified gastrointestinal hemorrhage type  -     Endoscopy, GI With Capsule  -     CBC & Differential; Future    Iron deficiency anemia due to chronic blood loss  -     CBC & Differential; Future    Gastroesophageal reflux disease with esophagitis  -     CBC & Differential; Future       Assessment:  1) GI bleeding of obscure etiology.  2) Heme positive stool  3) Iron deficiency anemia  4) GERD    REcommendations:  1) Continue iron pills and vitamin C  2) Continue getting CBC's monthly.  3) Let's get a Capsule Endoscopy.  4) f/u 3 mos.  5) Continue to lose  weight.       No Follow-up on file.

## 2017-05-01 ENCOUNTER — RESULTS ENCOUNTER (OUTPATIENT)
Dept: GASTROENTEROLOGY | Facility: CLINIC | Age: 72
End: 2017-05-01

## 2017-05-01 DIAGNOSIS — K21.00 GASTROESOPHAGEAL REFLUX DISEASE WITH ESOPHAGITIS: ICD-10-CM

## 2017-05-01 DIAGNOSIS — D50.0 IRON DEFICIENCY ANEMIA DUE TO CHRONIC BLOOD LOSS: ICD-10-CM

## 2017-05-01 DIAGNOSIS — K92.2 GASTROINTESTINAL HEMORRHAGE, UNSPECIFIED GASTROINTESTINAL HEMORRHAGE TYPE: ICD-10-CM

## 2017-05-08 ENCOUNTER — TELEPHONE (OUTPATIENT)
Dept: GASTROENTEROLOGY | Facility: CLINIC | Age: 72
End: 2017-05-08

## 2017-05-17 ENCOUNTER — TELEPHONE (OUTPATIENT)
Dept: GASTROENTEROLOGY | Facility: CLINIC | Age: 72
End: 2017-05-17

## 2017-05-26 LAB
BASOPHILS # BLD AUTO: 0.03 10*3/MM3 (ref 0–0.2)
BASOPHILS NFR BLD AUTO: 0.5 % (ref 0–1.5)
EOSINOPHIL # BLD AUTO: 0.14 10*3/MM3 (ref 0–0.7)
EOSINOPHIL NFR BLD AUTO: 2.4 % (ref 0.3–6.2)
ERYTHROCYTE [DISTWIDTH] IN BLOOD BY AUTOMATED COUNT: 14.4 % (ref 11.5–14.5)
HCT VFR BLD AUTO: 42 % (ref 40.4–52.2)
HGB BLD-MCNC: 13.1 G/DL (ref 13.7–17.6)
IMM GRANULOCYTES # BLD: 0.03 10*3/MM3 (ref 0–0.03)
IMM GRANULOCYTES NFR BLD: 0.5 % (ref 0–0.5)
LYMPHOCYTES # BLD AUTO: 1.32 10*3/MM3 (ref 0.9–4.8)
LYMPHOCYTES NFR BLD AUTO: 22.7 % (ref 19.6–45.3)
MCH RBC QN AUTO: 30.3 PG (ref 27–32.7)
MCHC RBC AUTO-ENTMCNC: 31.2 G/DL (ref 32.6–36.4)
MCV RBC AUTO: 97 FL (ref 79.8–96.2)
MONOCYTES # BLD AUTO: 0.39 10*3/MM3 (ref 0.2–1.2)
MONOCYTES NFR BLD AUTO: 6.7 % (ref 5–12)
NEUTROPHILS # BLD AUTO: 3.91 10*3/MM3 (ref 1.9–8.1)
NEUTROPHILS NFR BLD AUTO: 67.2 % (ref 42.7–76)
PLATELET # BLD AUTO: 423 10*3/MM3 (ref 140–500)
RBC # BLD AUTO: 4.33 10*6/MM3 (ref 4.6–6)
WBC # BLD AUTO: 5.82 10*3/MM3 (ref 4.5–10.7)

## 2017-05-31 ENCOUNTER — TELEPHONE (OUTPATIENT)
Dept: GASTROENTEROLOGY | Facility: CLINIC | Age: 72
End: 2017-05-31

## 2017-06-01 ENCOUNTER — APPOINTMENT (OUTPATIENT)
Dept: PAIN MEDICINE | Facility: HOSPITAL | Age: 72
End: 2017-06-01

## 2017-06-15 ENCOUNTER — OFFICE VISIT (OUTPATIENT)
Dept: GASTROENTEROLOGY | Facility: CLINIC | Age: 72
End: 2017-06-15

## 2017-06-15 VITALS
WEIGHT: 239 LBS | BODY MASS INDEX: 35.4 KG/M2 | DIASTOLIC BLOOD PRESSURE: 76 MMHG | HEIGHT: 69 IN | SYSTOLIC BLOOD PRESSURE: 128 MMHG | TEMPERATURE: 98.3 F

## 2017-06-15 DIAGNOSIS — Z13.1 DM (DIABETES MELLITUS SCREEN): ICD-10-CM

## 2017-06-15 DIAGNOSIS — R19.5 OCCULT GI BLEEDING: ICD-10-CM

## 2017-06-15 DIAGNOSIS — N18.9 CRI (CHRONIC RENAL INSUFFICIENCY), UNSPECIFIED STAGE: ICD-10-CM

## 2017-06-15 DIAGNOSIS — D50.0 IRON DEFICIENCY ANEMIA DUE TO CHRONIC BLOOD LOSS: Primary | ICD-10-CM

## 2017-06-15 LAB
ALBUMIN SERPL-MCNC: 4.3 G/DL (ref 3.5–5.2)
ALBUMIN/GLOB SERPL: 2.2 G/DL
ALP SERPL-CCNC: 46 U/L (ref 39–117)
ALT SERPL-CCNC: 17 U/L (ref 1–41)
AST SERPL-CCNC: 14 U/L (ref 1–40)
BASOPHILS # BLD AUTO: 0.03 10*3/MM3 (ref 0–0.2)
BASOPHILS NFR BLD AUTO: 0.5 % (ref 0–1.5)
BILIRUB SERPL-MCNC: 0.4 MG/DL (ref 0.1–1.2)
BUN SERPL-MCNC: 20 MG/DL (ref 8–23)
BUN/CREAT SERPL: 18.5 (ref 7–25)
CALCIUM SERPL-MCNC: 9.3 MG/DL (ref 8.6–10.5)
CHLORIDE SERPL-SCNC: 101 MMOL/L (ref 98–107)
CO2 SERPL-SCNC: 22.8 MMOL/L (ref 22–29)
CREAT SERPL-MCNC: 1.08 MG/DL (ref 0.76–1.27)
EOSINOPHIL # BLD AUTO: 0.16 10*3/MM3 (ref 0–0.7)
EOSINOPHIL NFR BLD AUTO: 2.5 % (ref 0.3–6.2)
ERYTHROCYTE [DISTWIDTH] IN BLOOD BY AUTOMATED COUNT: 15.3 % (ref 11.5–14.5)
FERRITIN SERPL-MCNC: 20.02 NG/ML (ref 30–400)
GLOBULIN SER CALC-MCNC: 2 GM/DL
GLUCOSE SERPL-MCNC: 137 MG/DL (ref 65–99)
HCT VFR BLD AUTO: 36.7 % (ref 40.4–52.2)
HGB BLD-MCNC: 11.6 G/DL (ref 13.7–17.6)
IMM GRANULOCYTES # BLD: 0.02 10*3/MM3 (ref 0–0.03)
IMM GRANULOCYTES NFR BLD: 0.3 % (ref 0–0.5)
IRON SATN MFR SERPL: 60 % (ref 20–50)
IRON SERPL-MCNC: 232 MCG/DL (ref 59–158)
LYMPHOCYTES # BLD AUTO: 1.09 10*3/MM3 (ref 0.9–4.8)
LYMPHOCYTES NFR BLD AUTO: 17 % (ref 19.6–45.3)
MCH RBC QN AUTO: 30.7 PG (ref 27–32.7)
MCHC RBC AUTO-ENTMCNC: 31.6 G/DL (ref 32.6–36.4)
MCV RBC AUTO: 97.1 FL (ref 79.8–96.2)
MONOCYTES # BLD AUTO: 0.52 10*3/MM3 (ref 0.2–1.2)
MONOCYTES NFR BLD AUTO: 8.1 % (ref 5–12)
NEUTROPHILS # BLD AUTO: 4.59 10*3/MM3 (ref 1.9–8.1)
NEUTROPHILS NFR BLD AUTO: 71.6 % (ref 42.7–76)
PLATELET # BLD AUTO: 351 10*3/MM3 (ref 140–500)
POTASSIUM SERPL-SCNC: 4.3 MMOL/L (ref 3.5–5.2)
PROT SERPL-MCNC: 6.3 G/DL (ref 6–8.5)
RBC # BLD AUTO: 3.78 10*6/MM3 (ref 4.6–6)
SODIUM SERPL-SCNC: 140 MMOL/L (ref 136–145)
TIBC SERPL-MCNC: 384 MCG/DL
UIBC SERPL-MCNC: 152 MCG/DL
WBC # BLD AUTO: 6.41 10*3/MM3 (ref 4.5–10.7)

## 2017-06-15 PROCEDURE — 99214 OFFICE O/P EST MOD 30 MIN: CPT | Performed by: INTERNAL MEDICINE

## 2017-06-15 RX ORDER — SODIUM CHLORIDE 0.9 % (FLUSH) 0.9 %
1-10 SYRINGE (ML) INJECTION AS NEEDED
Status: CANCELLED | OUTPATIENT
Start: 2017-06-15

## 2017-06-25 NOTE — PROGRESS NOTES
Tell him that his labs show that he is still anemic with a Hgb of 11.6 and he is iron deficient. Continue taking iron pills three times daily. Why are we waiting to do the EGD with small bowel enteroscopy till 11/16?Continue getting monthly CBC's. Why don't we do the EGD w/ small bowel enterescopy in the next 1-2 mos? santiago

## 2017-06-28 ENCOUNTER — TELEPHONE (OUTPATIENT)
Dept: GASTROENTEROLOGY | Facility: CLINIC | Age: 72
End: 2017-06-28

## 2017-06-28 NOTE — TELEPHONE ENCOUNTER
----- Message from Anil Prakash MD sent at 6/25/2017  3:52 PM EDT -----  Tell him that his labs show that he is still anemic with a Hgb of 11.6 and he is iron deficient. Continue taking iron pills three times daily. Why are we waiting to do the EGD with small bowel enteroscopy till 11/16?Continue getting monthly CBC's. Why don't we do the EGD w/ small bowel enterescopy in the next 1-2 mos? santiago

## 2017-07-03 ENCOUNTER — TELEPHONE (OUTPATIENT)
Dept: GASTROENTEROLOGY | Facility: CLINIC | Age: 72
End: 2017-07-03

## 2017-07-03 NOTE — TELEPHONE ENCOUNTER
Dr Prakash wants EGD with small bowel enteroscopy in 1-2 mo (instead of EGD in November).  Message to Estela MCGRAW

## 2017-07-03 NOTE — TELEPHONE ENCOUNTER
----- Message from Estela Ng sent at 7/3/2017  4:21 PM EDT -----  Regarding: NEW SURGICAL CASE REQUEST    Do EGD with small bowel enteroscopy in next 1-2 mo's PT HAS EGD ORDER AND IS SCHEDULED FOR NOV FOR JUST EGD DOES PT ALSO NEED THE EGD W/SMALL BOWEL ENTEROSCOPY IN AUGUST OR DO WE JUST NEED TO ADD TO THE November ORDER

## 2017-07-07 ENCOUNTER — ANESTHESIA (OUTPATIENT)
Dept: GASTROENTEROLOGY | Facility: HOSPITAL | Age: 72
End: 2017-07-07

## 2017-07-07 ENCOUNTER — HOSPITAL ENCOUNTER (OUTPATIENT)
Facility: HOSPITAL | Age: 72
Setting detail: HOSPITAL OUTPATIENT SURGERY
Discharge: HOME | End: 2017-07-07
Attending: INTERNAL MEDICINE | Admitting: INTERNAL MEDICINE

## 2017-07-07 ENCOUNTER — ANESTHESIA EVENT (OUTPATIENT)
Dept: GASTROENTEROLOGY | Facility: HOSPITAL | Age: 72
End: 2017-07-07

## 2017-07-07 VITALS
HEART RATE: 64 BPM | SYSTOLIC BLOOD PRESSURE: 124 MMHG | HEIGHT: 69 IN | WEIGHT: 241.7 LBS | TEMPERATURE: 98.1 F | BODY MASS INDEX: 35.8 KG/M2 | RESPIRATION RATE: 14 BRPM | DIASTOLIC BLOOD PRESSURE: 80 MMHG | OXYGEN SATURATION: 95 %

## 2017-07-07 DIAGNOSIS — D50.0 IRON DEFICIENCY ANEMIA DUE TO CHRONIC BLOOD LOSS: ICD-10-CM

## 2017-07-07 LAB
BASOPHILS # BLD AUTO: 0.03 10*3/MM3 (ref 0–0.2)
BASOPHILS NFR BLD AUTO: 0.4 % (ref 0–1.5)
DEPRECATED RDW RBC AUTO: 49.8 FL (ref 37–54)
EOSINOPHIL # BLD AUTO: 0.11 10*3/MM3 (ref 0–0.7)
EOSINOPHIL NFR BLD AUTO: 1.6 % (ref 0.3–6.2)
ERYTHROCYTE [DISTWIDTH] IN BLOOD BY AUTOMATED COUNT: 14.1 % (ref 11.5–14.5)
FERRITIN SERPL-MCNC: 22.51 NG/ML (ref 30–400)
GLUCOSE BLDC GLUCOMTR-MCNC: 112 MG/DL (ref 70–130)
HCT VFR BLD AUTO: 38.6 % (ref 40.4–52.2)
HGB BLD-MCNC: 12.6 G/DL (ref 13.7–17.6)
IMM GRANULOCYTES # BLD: 0.05 10*3/MM3 (ref 0–0.03)
IMM GRANULOCYTES NFR BLD: 0.7 % (ref 0–0.5)
LYMPHOCYTES # BLD AUTO: 1.07 10*3/MM3 (ref 0.9–4.8)
LYMPHOCYTES NFR BLD AUTO: 15.9 % (ref 19.6–45.3)
MCH RBC QN AUTO: 31.7 PG (ref 27–32.7)
MCHC RBC AUTO-ENTMCNC: 32.6 G/DL (ref 32.6–36.4)
MCV RBC AUTO: 97 FL (ref 79.8–96.2)
MONOCYTES # BLD AUTO: 0.26 10*3/MM3 (ref 0.2–1.2)
MONOCYTES NFR BLD AUTO: 3.9 % (ref 5–12)
NEUTROPHILS # BLD AUTO: 5.19 10*3/MM3 (ref 1.9–8.1)
NEUTROPHILS NFR BLD AUTO: 77.5 % (ref 42.7–76)
PLATELET # BLD AUTO: 305 10*3/MM3 (ref 140–500)
PMV BLD AUTO: 9.3 FL (ref 6–12)
RBC # BLD AUTO: 3.98 10*6/MM3 (ref 4.6–6)
WBC NRBC COR # BLD: 6.71 10*3/MM3 (ref 4.5–10.7)

## 2017-07-07 PROCEDURE — 82962 GLUCOSE BLOOD TEST: CPT

## 2017-07-07 PROCEDURE — 25010000002 PROPOFOL 10 MG/ML EMULSION: Performed by: ANESTHESIOLOGY

## 2017-07-07 PROCEDURE — 85025 COMPLETE CBC W/AUTO DIFF WBC: CPT | Performed by: INTERNAL MEDICINE

## 2017-07-07 PROCEDURE — 82728 ASSAY OF FERRITIN: CPT | Performed by: INTERNAL MEDICINE

## 2017-07-07 PROCEDURE — 25010000002 GLUCAGON (HUMAN RECOMBINANT) 1 MG RECONSTITUTED SOLUTION: Performed by: INTERNAL MEDICINE

## 2017-07-07 RX ORDER — SODIUM CHLORIDE, SODIUM LACTATE, POTASSIUM CHLORIDE, CALCIUM CHLORIDE 600; 310; 30; 20 MG/100ML; MG/100ML; MG/100ML; MG/100ML
30 INJECTION, SOLUTION INTRAVENOUS CONTINUOUS
Status: DISCONTINUED | OUTPATIENT
Start: 2017-07-07 | End: 2017-07-07 | Stop reason: HOSPADM

## 2017-07-07 RX ORDER — PROPOFOL 10 MG/ML
VIAL (ML) INTRAVENOUS AS NEEDED
Status: DISCONTINUED | OUTPATIENT
Start: 2017-07-07 | End: 2017-07-07 | Stop reason: SURG

## 2017-07-07 RX ORDER — LIDOCAINE HYDROCHLORIDE 20 MG/ML
INJECTION, SOLUTION INFILTRATION; PERINEURAL AS NEEDED
Status: DISCONTINUED | OUTPATIENT
Start: 2017-07-07 | End: 2017-07-07 | Stop reason: SURG

## 2017-07-07 RX ORDER — SODIUM CHLORIDE 0.9 % (FLUSH) 0.9 %
1-10 SYRINGE (ML) INJECTION AS NEEDED
Status: DISCONTINUED | OUTPATIENT
Start: 2017-07-07 | End: 2017-07-07 | Stop reason: HOSPADM

## 2017-07-07 RX ADMIN — PROPOFOL 200 MG: 10 INJECTION, EMULSION INTRAVENOUS at 09:19

## 2017-07-07 RX ADMIN — LIDOCAINE HYDROCHLORIDE 100 MG: 20 INJECTION, SOLUTION INFILTRATION; PERINEURAL at 09:00

## 2017-07-07 RX ADMIN — PROPOFOL 200 MG: 10 INJECTION, EMULSION INTRAVENOUS at 09:30

## 2017-07-07 RX ADMIN — SODIUM CHLORIDE, POTASSIUM CHLORIDE, SODIUM LACTATE AND CALCIUM CHLORIDE 30 ML/HR: 600; 310; 30; 20 INJECTION, SOLUTION INTRAVENOUS at 08:28

## 2017-07-07 RX ADMIN — PROPOFOL 200 MG: 10 INJECTION, EMULSION INTRAVENOUS at 09:00

## 2017-07-07 RX ADMIN — PROPOFOL 200 MG: 10 INJECTION, EMULSION INTRAVENOUS at 09:10

## 2017-07-07 NOTE — ANESTHESIA PREPROCEDURE EVALUATION
Anesthesia Evaluation     Patient summary reviewed and Nursing notes reviewed   NPO Solid Status: > 8 hours  NPO Liquid Status: > 2 hours     Airway   Mallampati: III  TM distance: >3 FB  Neck ROM: full  no difficulty expected  Dental    (+) upper dentures and lower dentures    Pulmonary - normal exam   Cardiovascular - normal exam    (+) hypertension,       Neuro/Psych  (+) psychiatric history,    GI/Hepatic/Renal/Endo    (+) obesity,  diabetes mellitus type 2 well controlled,     Musculoskeletal     Abdominal  - normal exam    Bowel sounds: normal.   Substance History      OB/GYN          Other                                        Anesthesia Plan    ASA 3     MAC     Anesthetic plan and risks discussed with patient.

## 2017-07-07 NOTE — ANESTHESIA POSTPROCEDURE EVALUATION
Patient: Danyel Dash    Procedure Summary     Date Anesthesia Start Anesthesia Stop Room / Location    07/07/17 0854 [id number]  [facility]       Procedure Diagnosis Surgeon Provider    ESOPHAGOGASTRODUODENOSCOPY (N/A Esophagus); ENTEROSCOPY SMALL BOWEL (Esophagus) Iron deficiency anemia due to chronic blood loss  (Iron deficiency anemia due to chronic blood loss [D50.0]) MD Bisi Terrazas MD          Anesthesia Type: MAC  Last vitals  /80 (07/07/17 1000)    Temp      Pulse 64 (07/07/17 1000)   Resp 14 (07/07/17 1000)    SpO2 95 % (07/07/17 1000)      Post Anesthesia Care and Evaluation    Patient location during evaluation: PACU  Patient participation: complete - patient participated  Level of consciousness: awake  Pain score: 0  Pain management: adequate  Airway patency: patent  Anesthetic complications: No anesthetic complications    Cardiovascular status: acceptable  Respiratory status: acceptable  Hydration status: acceptable

## 2017-07-07 NOTE — PLAN OF CARE
Problem: Patient Care Overview (Adult)  Goal: Plan of Care Review  Outcome: Ongoing (interventions implemented as appropriate)    07/07/17 0834   Coping/Psychosocial Response Interventions   Plan Of Care Reviewed With patient   Patient Care Overview   Progress no change   Outcome Evaluation   Outcome Summary/Follow up Plan pending procedure results       Goal: Adult Individualization and Mutuality  Outcome: Ongoing (interventions implemented as appropriate)    07/07/17 0834   Individualization   Patient Specific Preferences goes by Beto   Mutuality/Individual Preferences   What Anxieties, Fears or Concerns Do You Have About Your Health or Care? none       Goal: Discharge Needs Assessment  Outcome: Ongoing (interventions implemented as appropriate)    07/07/17 0834   Discharge Needs Assessment   Concerns To Be Addressed no discharge needs identified   Discharge Disposition home or self-care   Living Environment   Transportation Available car         Problem: GI Endoscopy (Adult)  Goal: Signs and Symptoms of Listed Potential Problems Will be Absent or Manageable (GI Endoscopy)  Outcome: Ongoing (interventions implemented as appropriate)    07/07/17 0834   GI Endoscopy   Problems Assessed (GI Endoscopy) pain;bleeding;hypoxia/hypoxemia   Problems Present (GI Endoscopy) none

## 2017-07-07 NOTE — H&P
"Chief Complaint   Patient presents with   • Follow-up       from capsule study          History of Present Illness: We discussed the monthly CBCs that he has been having. His most recent CBC done last month showed a hemoglobin that had dropped slightly to 13.1. 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 It appears that there were multiple bleeding sites in the proximal small bowel. Bleeding began at 10:07, less than 1 minute into the duodenum. Should be accessible to treat endoscopically. Second area of apparent bleeding noted at 25:46. The capsule reached the cecum. The TI was visualized.\" We also reviewed the results of his 12/2016 EGD and colonoscopy. He feels good. He did increase the iron gluconate pills and vitamin C to TID x 10 days and he is now on BID iron. His stool is black while on iron. No rectal bleeding. No diarrhea, he is constipated. No abdominal or chest pain. No SOA, no dizziness or Lightheadedness. No heartburn.      Medical History         Past Medical History:   Diagnosis Date   • Anemia     • Chronic kidney disease     • Coronary artery disease       rheumatic fever at age 7   • Diabetes mellitus     • Hypertension     • Low back pain               Surgical History          Past Surgical History:   Procedure Laterality Date   • COLONOSCOPY N/A 12/27/2016     tics, IH, polyps   • ENDOSCOPY N/A 12/27/2016     LA Grade B reflux esophagitis, erythematous mucosa in stomach   • KNEE ARTHROSCOPY Right     • SHOULDER ARTHROSCOPY Right     • TONSILLECTOMY                   Current Outpatient Prescriptions:   • amLODIPine (NORVASC) 10 MG tablet, Take 1 tablet by mouth Daily., Disp: 90 tablet, Rfl: 1  • aspirin 81 MG EC tablet, Take 81 mg by mouth Daily., Disp: , Rfl:   • B Complex Vitamins (VITAMIN B COMPLEX) tablet, Take 1 tablet by mouth Daily., Disp: , Rfl:   • Blood Gluc Meter Disp-Strips device, Pt to monitor blood glucose two " times daily, Disp: 1 each, Rfl: 0  • buPROPion XL (WELLBUTRIN XL) 300 MG 24 hr tablet, Take 1 tablet by mouth Daily., Disp: 90 tablet, Rfl: 1  • Cholecalciferol (VITAMIN D-3) 1000 UNITS capsule, Take 1,000 Units by mouth Daily., Disp: , Rfl:   • fluticasone (FLONASE ALLERGY RELIEF) 50 MCG/ACT nasal spray, 2 sprays into each nostril Daily., Disp: , Rfl:   • Iron, Ferrous Gluconate, 256 (28 FE) MG tablet, Take 1 tablet by mouth daily., Disp: , Rfl:   • lisinopril (PRINIVIL,ZESTRIL) 10 MG tablet, Take 1 tablet by mouth Daily., Disp: 90 tablet, Rfl: 1  • omeprazole (priLOSEC) 20 MG capsule, Take 1 capsule by mouth Daily., Disp: 90 capsule, Rfl: 1  • pioglitazone (ACTOS) 45 MG tablet, Take 1 tablet by mouth Daily., Disp: 90 tablet, Rfl: 1  • tadalafil (CIALIS) 20 MG tablet, Take 1 tablet by mouth Daily As Needed for erectile dysfunction., Disp: 90 tablet, Rfl: 1     No Known Allergies           Family History   Problem Relation Age of Onset   • Diverticulosis Father            Social History    Social History            Social History   • Marital status:        Spouse name: N/A   • Number of children: N/A   • Years of education: N/A          Occupational History   • Not on file.           Social History Main Topics   • Smoking status: Former Smoker   • Smokeless tobacco: Not on file   • Alcohol use No   • Drug use: No   • Sexual activity: Not on file           Other Topics Concern   • Not on file      Social History Narrative            Review of Systems   All other systems reviewed and are negative.            Vitals:     06/15/17 0819   BP: 128/76   Temp: 98.3 °F (36.8 °C)         Physical Exam   Constitutional: He is oriented to person, place, and time. He appears well-developed and well-nourished. No distress.   HENT:   Head: Normocephalic and atraumatic. Hair is normal.   Right Ear: Hearing, tympanic membrane, external ear and ear canal normal.   Left Ear: Hearing, tympanic membrane, external ear and ear  canal normal.   Nose: No sinus tenderness or nasal deformity.   Mouth/Throat: Uvula is midline, oropharynx is clear and moist and mucous membranes are normal. No oral lesions. No uvula swelling.   Eyes: Conjunctivae, EOM and lids are normal. Pupils are equal, round, and reactive to light. Right eye exhibits no discharge. Left eye exhibits no discharge. No scleral icterus. Right eye exhibits normal extraocular motion and no nystagmus. Left eye exhibits normal extraocular motion and no nystagmus.   Fundoscopic exam:  The right eye shows red reflex.   The left eye shows red reflex.   Neck: Normal range of motion. Neck supple. No JVD present. No tracheal deviation present. No thyromegaly present.   Cardiovascular: Normal rate, regular rhythm, normal heart sounds, intact distal pulses and normal pulses. Exam reveals no gallop.   No murmur heard.  Pulmonary/Chest: Effort normal and breath sounds normal. No respiratory distress. He has no wheezes. He has no rales. He exhibits no tenderness.   Abdominal: Soft. Bowel sounds are normal. He exhibits no distension and no mass. There is no tenderness. There is no guarding. No hernia.   Genitourinary: Rectum normal and prostate normal.   Musculoskeletal: Normal range of motion. He exhibits no edema, tenderness or deformity.   Lymphadenopathy:   He has no cervical adenopathy.   Neurological: He is alert and oriented to person, place, and time. He has normal reflexes. He displays normal reflexes. No cranial nerve deficit. He exhibits normal muscle tone. Coordination normal.   Skin: Skin is warm and dry. No rash noted. He is not diaphoretic.   Psychiatric: He has a normal mood and affect. His behavior is normal. Judgment and thought content normal.   Nursing note and vitals reviewed.        Danyel was seen today for follow-up.     Diagnoses and all orders for this visit:     Iron deficiency anemia due to chronic blood loss  - Case Request; Standing  - sodium chloride 0.9 % flush  1-10 mL; Infuse 1-10 mL into a venous catheter As Needed for Line Care.  - Case Request  - CBC & Differential  - Comprehensive Metabolic Panel  - Iron and TIBC  - Ferritin     DM (diabetes mellitus screen)  - CBC & Differential  - Comprehensive Metabolic Panel  - Iron and TIBC  - Ferritin     CRI (chronic renal insufficiency), unspecified stage  - CBC & Differential  - Comprehensive Metabolic Panel  - Iron and TIBC  - Ferritin     Occult GI bleeding     Other orders  - Implement Anesthesia orders day of procedure.; Standing  - Obtain informed consent; Standing  - Insert Peripheral IV; Standing  - Saline Lock & Maintain IV Access; Standing        Assessment:  1) GI bleeding of obscure etiology.  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  2) Heme positive stool  3) Iron deficiency anemia  4) GERD      REcommendations:  1) I would recommend that we do an EGD and a small bowel enteroscopy to try to burn the bleeding arteriovenous malformations in the proximal small bowel noted on capsule endoscopy.  2) CBC  3) Increase the iron pills to TID  4) Continue getting CBC's monthly.     7/7/17 - No change from the above H and PJoseph Prakash M.D.

## 2017-07-09 NOTE — PROGRESS NOTES
Tell him that he is still mildly anemic with a Hgb of 12.6 which is higher than it was 3 weeks ago (when it was 11.6). His iron level (ferritin) is still low at 22.5. Continue taking iron pills and continue getting monthly CBC's. levi

## 2017-07-11 ENCOUNTER — TELEPHONE (OUTPATIENT)
Dept: GASTROENTEROLOGY | Facility: CLINIC | Age: 72
End: 2017-07-11

## 2017-07-11 NOTE — TELEPHONE ENCOUNTER
----- Message from Anil Prakash MD sent at 7/9/2017  2:46 PM EDT -----  Tell him that he is still mildly anemic with a Hgb of 12.6 which is higher than it was 3 weeks ago (when it was 11.6). His iron level (ferritin) is still low at 22.5. Continue taking iron pills and continue getting monthly CBC's. levi

## 2017-07-18 NOTE — TELEPHONE ENCOUNTER
November order was cancelled please enter new order for egd small bowel enteroscopy to be scheduled in 1 to 2 months. Estela SURESH    See note of 6/28/17.  Attempt to place order without success.  Message to DR Prakash.

## 2017-07-18 NOTE — TELEPHONE ENCOUNTER
Based on my notes during the EGD and small bowel enteroscopy done earlier this month I do not want to schedule any more scopes right now. Have him f/u with me in the office in 8ish weeks and I will decide with him when we should do any other scopes. [email or web address]

## 2017-07-18 NOTE — TELEPHONE ENCOUNTER
Called pt and advised per Dr Prakash that he is still mildly anemic with a hgb of 12.6 which is higher than it was 3 wks ago ( when it was 11.6). His iron level is still low at 22.5.  Continue iron supp and continue getting monthly cbc's.  Pt verb understanding and reports he has a f/u on 07/26.

## 2017-07-25 ENCOUNTER — TELEPHONE (OUTPATIENT)
Dept: GASTROENTEROLOGY | Facility: CLINIC | Age: 72
End: 2017-07-25

## 2017-07-25 DIAGNOSIS — D64.9 ANEMIA, UNSPECIFIED TYPE: Primary | ICD-10-CM

## 2017-07-26 LAB
BASOPHILS # BLD AUTO: 0.04 10*3/MM3 (ref 0–0.2)
BASOPHILS NFR BLD AUTO: 0.6 % (ref 0–1.5)
EOSINOPHIL # BLD AUTO: 0.13 10*3/MM3 (ref 0–0.7)
EOSINOPHIL NFR BLD AUTO: 2.1 % (ref 0.3–6.2)
ERYTHROCYTE [DISTWIDTH] IN BLOOD BY AUTOMATED COUNT: 14.3 % (ref 11.5–14.5)
HCT VFR BLD AUTO: 36 % (ref 40.4–52.2)
HGB BLD-MCNC: 11.1 G/DL (ref 13.7–17.6)
IMM GRANULOCYTES # BLD: 0.08 10*3/MM3 (ref 0–0.03)
IMM GRANULOCYTES NFR BLD: 1.3 % (ref 0–0.5)
LYMPHOCYTES # BLD AUTO: 1.01 10*3/MM3 (ref 0.9–4.8)
LYMPHOCYTES NFR BLD AUTO: 16.1 % (ref 19.6–45.3)
MCH RBC QN AUTO: 31 PG (ref 27–32.7)
MCHC RBC AUTO-ENTMCNC: 30.8 G/DL (ref 32.6–36.4)
MCV RBC AUTO: 100.6 FL (ref 79.8–96.2)
MONOCYTES # BLD AUTO: 0.58 10*3/MM3 (ref 0.2–1.2)
MONOCYTES NFR BLD AUTO: 9.2 % (ref 5–12)
NEUTROPHILS # BLD AUTO: 4.44 10*3/MM3 (ref 1.9–8.1)
NEUTROPHILS NFR BLD AUTO: 70.7 % (ref 42.7–76)
NRBC BLD AUTO-RTO: 0 /100 WBC (ref 0–0)
PLATELET # BLD AUTO: 377 10*3/MM3 (ref 140–500)
RBC # BLD AUTO: 3.58 10*6/MM3 (ref 4.6–6)
WBC # BLD AUTO: 6.28 10*3/MM3 (ref 4.5–10.7)

## 2017-07-30 NOTE — TELEPHONE ENCOUNTER
Tell him that his most recent CBC (from 4 days ago) had dropped slightly to a Hgb of 11.1. I want him to continue taking iron pills with vitamin C TID. Continue monthly CBC's

## 2017-07-31 ENCOUNTER — TELEPHONE (OUTPATIENT)
Dept: GASTROENTEROLOGY | Facility: CLINIC | Age: 72
End: 2017-07-31

## 2017-08-03 NOTE — TELEPHONE ENCOUNTER
Attempt return call to pt.   identifies as Beto.  Advise per Dr Prakash that lab has dropped slightly.  Continue iron pills with Vitamin C three times a day.  Continue monthly lab work.  Contact office if any questions.  Pt next lab appt for 8/15/17.

## 2017-08-07 ENCOUNTER — OFFICE VISIT (OUTPATIENT)
Dept: GASTROENTEROLOGY | Facility: CLINIC | Age: 72
End: 2017-08-07

## 2017-08-07 VITALS
HEIGHT: 70 IN | SYSTOLIC BLOOD PRESSURE: 132 MMHG | WEIGHT: 246.8 LBS | TEMPERATURE: 98 F | DIASTOLIC BLOOD PRESSURE: 82 MMHG | BODY MASS INDEX: 35.33 KG/M2

## 2017-08-07 DIAGNOSIS — K92.2 GASTROINTESTINAL HEMORRHAGE, UNSPECIFIED GASTROINTESTINAL HEMORRHAGE TYPE: ICD-10-CM

## 2017-08-07 DIAGNOSIS — K21.9 GASTROESOPHAGEAL REFLUX DISEASE, ESOPHAGITIS PRESENCE NOT SPECIFIED: Primary | ICD-10-CM

## 2017-08-07 DIAGNOSIS — D50.9 IRON DEFICIENCY ANEMIA, UNSPECIFIED IRON DEFICIENCY ANEMIA TYPE: ICD-10-CM

## 2017-08-07 PROCEDURE — 99213 OFFICE O/P EST LOW 20 MIN: CPT | Performed by: INTERNAL MEDICINE

## 2017-08-07 NOTE — PROGRESS NOTES
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 We reviewed his labs. His most recent CBC (7/26/17) showed a Hgb of 11.1. He feels fine. He takes iron pills 2/AM and 1/ PM. No rectal bleeding, stool is dark while on the iron. NO diarrhea or constipation. No abdominal or chest pain. No heartburn. He is on one baby asa/day but no other NSAIDs.     Past Medical History:   Diagnosis Date   • Anemia    • Chronic kidney disease    • Coronary artery disease     rheumatic fever at age 7   • Diabetes mellitus    • Hypertension    • Low back pain        Past Surgical History:   Procedure Laterality Date   • COLONOSCOPY N/A 12/27/2016    tics, IH, polyps   • ENDOSCOPY N/A 12/27/2016    LA Grade B reflux esophagitis, erythematous mucosa in stomach   • ENDOSCOPY N/A 7/7/2017    two jejunal AVM's    • ENTEROSCOPY SMALL BOWEL  7/7/2017    Procedure: ENTEROSCOPY SMALL BOWEL;  Surgeon: Anil Prakash MD;  Location: [facility] ENDOSCOPY;  Service:    • EPIDURAL BLOCK     • KNEE ARTHROSCOPY Right    • SHOULDER ARTHROSCOPY Right    • TONSILLECTOMY           Current Outpatient Prescriptions:   •  amLODIPine (NORVASC) 10 MG tablet, Take 1 tablet by mouth Daily., Disp: 90 tablet, Rfl: 1  •  aspirin 81 MG EC tablet, Take 81 mg by mouth Daily., Disp: , Rfl:   •  B Complex Vitamins (VITAMIN B COMPLEX) tablet, Take 1 tablet by mouth Daily., Disp: , Rfl:   •  Blood Gluc Meter Disp-Strips device, Pt to monitor blood glucose two times daily, Disp: 1 each, Rfl: 0  •  buPROPion XL (WELLBUTRIN XL) 300 MG 24 hr tablet, Take 1 tablet by mouth Daily., Disp: 90 tablet, Rfl: 1  •  Cholecalciferol (VITAMIN D-3) 1000 UNITS capsule, Take 1,000 Units by mouth Daily., Disp: , Rfl:   •  fluticasone (FLONASE ALLERGY RELIEF) 50 MCG/ACT nasal spray, 2 sprays into each nostril Daily., Disp: , Rfl:   •  Iron, Ferrous Gluconate, 256  (28 FE) MG tablet, Take 1 tablet by mouth daily., Disp: , Rfl:   •  lisinopril (PRINIVIL,ZESTRIL) 10 MG tablet, Take 1 tablet by mouth Daily., Disp: 90 tablet, Rfl: 1  •  omeprazole (priLOSEC) 20 MG capsule, Take 1 capsule by mouth Daily., Disp: 90 capsule, Rfl: 1  •  pioglitazone (ACTOS) 45 MG tablet, Take 1 tablet by mouth Daily., Disp: 90 tablet, Rfl: 1  •  tadalafil (CIALIS) 20 MG tablet, Take 1 tablet by mouth Daily As Needed for erectile dysfunction., Disp: 90 tablet, Rfl: 1    No Known Allergies    Family History   Problem Relation Age of Onset   • Diverticulosis Father        Social History     Social History   • Marital status:      Spouse name: N/A   • Number of children: N/A   • Years of education: N/A     Occupational History   • Not on file.     Social History Main Topics   • Smoking status: Former Smoker   • Smokeless tobacco: Never Used      Comment: quit 1997   • Alcohol use No   • Drug use: No   • Sexual activity: Defer     Other Topics Concern   • Not on file     Social History Narrative       Review of Systems   All other systems reviewed and are negative.      Vitals:    08/07/17 0956   BP: 132/82   Temp: 98 °F (36.7 °C)       Physical Exam   Constitutional: He is oriented to person, place, and time. He appears well-developed and well-nourished. No distress.   HENT:   Head: Normocephalic and atraumatic. Hair is normal.   Right Ear: Hearing, tympanic membrane, external ear and ear canal normal.   Left Ear: Hearing, tympanic membrane, external ear and ear canal normal.   Nose: No sinus tenderness or nasal deformity.   Mouth/Throat: Uvula is midline, oropharynx is clear and moist and mucous membranes are normal. No oral lesions. No uvula swelling.   Eyes: Conjunctivae, EOM and lids are normal. Pupils are equal, round, and reactive to light. Right eye exhibits no discharge. Left eye exhibits no discharge. No scleral icterus. Right eye exhibits normal extraocular motion and no nystagmus. Left  eye exhibits normal extraocular motion and no nystagmus.   Neck: Normal range of motion. Neck supple. No JVD present. No thyromegaly present.   Cardiovascular: Normal rate, regular rhythm, normal heart sounds, intact distal pulses and normal pulses.  Exam reveals no gallop.    No murmur heard.  Pulmonary/Chest: Effort normal and breath sounds normal. No respiratory distress. He has no wheezes. He has no rales. He exhibits no tenderness.   Abdominal: Soft. Bowel sounds are normal. He exhibits no distension and no mass. There is no tenderness. There is no guarding. No hernia.   Genitourinary: Rectal exam shows guaiac negative stool.   Genitourinary Comments: Normal anorectal exxam.   Musculoskeletal: Normal range of motion. He exhibits no edema, tenderness or deformity.   Lymphadenopathy:     He has no cervical adenopathy.   Neurological: He is alert and oriented to person, place, and time. He has normal reflexes. He displays normal reflexes. No cranial nerve deficit. He exhibits normal muscle tone. Coordination normal.   Skin: Skin is warm and dry. No rash noted. He is not diaphoretic.   Psychiatric: He has a normal mood and affect. His behavior is normal. Judgment and thought content normal.   Vitals reviewed.      Danyel was seen today for follow-up and anemia.    Diagnoses and all orders for this visit:    Gastroesophageal reflux disease, esophagitis presence not specified    Iron deficiency anemia, unspecified iron deficiency anemia type    Gastrointestinal hemorrhage, unspecified gastrointestinal hemorrhage type     Assessment:  1) GI bleeding of obscure etiology. 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 He had a 7/17 EGD with small bowel enteroscopy where I burned some prox small bowel AVM's. His stool is heme negative today!  2) Iron def anemia.  3) GERD - on omeprazole     Recommendations:  1) Continue taking iron 3-4 /day  2) Continue monthly CBC's  3) f/u 3  mos.       No Follow-up on file.

## 2017-08-10 ENCOUNTER — TELEPHONE (OUTPATIENT)
Dept: GASTROENTEROLOGY | Facility: CLINIC | Age: 72
End: 2017-08-10

## 2017-08-10 NOTE — TELEPHONE ENCOUNTER
----- Message from Anil Prakash MD sent at 7/30/2017  6:01 PM EDT -----  Tell him that his most recent CBC (from 4 days ago) had dropped slightly to a Hgb of 11.1. I want him to continue taking iron pills with vitamin C TID. Continue monthly CBC's

## 2017-08-11 NOTE — TELEPHONE ENCOUNTER
Called pt and advised per Dr Prakash that his most recent cbc had dropped slightly to a hgb of 11.1.  He wants him to continue taking iron pills with vit c tid .  Continue monthly cbc's.  Pt verb understanding and pt is scheduled for next appt on Tues 08/15

## 2017-08-14 ENCOUNTER — TELEPHONE (OUTPATIENT)
Dept: GASTROENTEROLOGY | Facility: CLINIC | Age: 72
End: 2017-08-14

## 2017-08-14 ENCOUNTER — RESULTS ENCOUNTER (OUTPATIENT)
Dept: GASTROENTEROLOGY | Facility: CLINIC | Age: 72
End: 2017-08-14

## 2017-08-14 DIAGNOSIS — D64.9 ANEMIA, UNSPECIFIED TYPE: Primary | ICD-10-CM

## 2017-08-14 DIAGNOSIS — D64.9 ANEMIA, UNSPECIFIED TYPE: ICD-10-CM

## 2017-08-15 LAB
BASOPHILS # BLD AUTO: 0.03 10*3/MM3 (ref 0–0.2)
BASOPHILS NFR BLD AUTO: 0.5 % (ref 0–1.5)
EOSINOPHIL # BLD AUTO: 0.12 10*3/MM3 (ref 0–0.7)
EOSINOPHIL NFR BLD AUTO: 2 % (ref 0.3–6.2)
ERYTHROCYTE [DISTWIDTH] IN BLOOD BY AUTOMATED COUNT: 13.2 % (ref 11.5–14.5)
HCT VFR BLD AUTO: 41.1 % (ref 40.4–52.2)
HGB BLD-MCNC: 13.2 G/DL (ref 13.7–17.6)
IMM GRANULOCYTES # BLD: 0.05 10*3/MM3 (ref 0–0.03)
IMM GRANULOCYTES NFR BLD: 0.8 % (ref 0–0.5)
LYMPHOCYTES # BLD AUTO: 1.17 10*3/MM3 (ref 0.9–4.8)
LYMPHOCYTES NFR BLD AUTO: 19.7 % (ref 19.6–45.3)
MCH RBC QN AUTO: 31.6 PG (ref 27–32.7)
MCHC RBC AUTO-ENTMCNC: 32.1 G/DL (ref 32.6–36.4)
MCV RBC AUTO: 98.3 FL (ref 79.8–96.2)
MONOCYTES # BLD AUTO: 0.54 10*3/MM3 (ref 0.2–1.2)
MONOCYTES NFR BLD AUTO: 9.1 % (ref 5–12)
NEUTROPHILS # BLD AUTO: 4.03 10*3/MM3 (ref 1.9–8.1)
NEUTROPHILS NFR BLD AUTO: 67.9 % (ref 42.7–76)
PLATELET # BLD AUTO: 332 10*3/MM3 (ref 140–500)
RBC # BLD AUTO: 4.18 10*6/MM3 (ref 4.6–6)
WBC # BLD AUTO: 5.94 10*3/MM3 (ref 4.5–10.7)

## 2017-08-15 NOTE — PROGRESS NOTES
Tell him that his CBC shows his hemoglobin is 13.2 which is higher than it's been in 2 months.  This is good.  I would continue taking the iron as he is taking it and will continue getting monthly CBCs.

## 2017-08-16 ENCOUNTER — TELEPHONE (OUTPATIENT)
Dept: GASTROENTEROLOGY | Facility: CLINIC | Age: 72
End: 2017-08-16

## 2017-08-16 NOTE — TELEPHONE ENCOUNTER
----- Message from Anil Prakash MD sent at 8/15/2017  6:00 PM EDT -----  Tell him that his CBC shows his hemoglobin is 13.2 which is higher than it's been in 2 months.  This is good.  I would continue taking the iron as he is taking it and will continue getting monthly CBCs.

## 2017-09-15 ENCOUNTER — TELEPHONE (OUTPATIENT)
Dept: GASTROENTEROLOGY | Facility: CLINIC | Age: 72
End: 2017-09-15

## 2017-09-15 DIAGNOSIS — D64.9 ANEMIA, UNSPECIFIED TYPE: Primary | ICD-10-CM

## 2017-09-15 LAB
BASOPHILS # BLD AUTO: 0.02 10*3/MM3 (ref 0–0.2)
BASOPHILS NFR BLD AUTO: 0.4 % (ref 0–1.5)
EOSINOPHIL # BLD AUTO: 0.09 10*3/MM3 (ref 0–0.7)
EOSINOPHIL NFR BLD AUTO: 1.7 % (ref 0.3–6.2)
ERYTHROCYTE [DISTWIDTH] IN BLOOD BY AUTOMATED COUNT: 13.2 % (ref 11.5–14.5)
HCT VFR BLD AUTO: 42.2 % (ref 40.4–52.2)
HGB BLD-MCNC: 13.7 G/DL (ref 13.7–17.6)
IMM GRANULOCYTES # BLD: 0.03 10*3/MM3 (ref 0–0.03)
IMM GRANULOCYTES NFR BLD: 0.6 % (ref 0–0.5)
LYMPHOCYTES # BLD AUTO: 1.1 10*3/MM3 (ref 0.9–4.8)
LYMPHOCYTES NFR BLD AUTO: 20.2 % (ref 19.6–45.3)
MCH RBC QN AUTO: 31.1 PG (ref 27–32.7)
MCHC RBC AUTO-ENTMCNC: 32.5 G/DL (ref 32.6–36.4)
MCV RBC AUTO: 95.7 FL (ref 79.8–96.2)
MONOCYTES # BLD AUTO: 0.37 10*3/MM3 (ref 0.2–1.2)
MONOCYTES NFR BLD AUTO: 6.8 % (ref 5–12)
NEUTROPHILS # BLD AUTO: 3.84 10*3/MM3 (ref 1.9–8.1)
NEUTROPHILS NFR BLD AUTO: 70.3 % (ref 42.7–76)
PLATELET # BLD AUTO: 344 10*3/MM3 (ref 140–500)
RBC # BLD AUTO: 4.41 10*6/MM3 (ref 4.6–6)
WBC # BLD AUTO: 5.45 10*3/MM3 (ref 4.5–10.7)

## 2017-09-15 NOTE — TELEPHONE ENCOUNTER
Tell him that his most recent CBC looks even better. Continue taking the iron pills and continue getting monthly CBC's. levi

## 2017-09-17 NOTE — TELEPHONE ENCOUNTER
Tell him that his Hgb 2 days ago was 13.7, which is even better, which is good. Continue the iron pills and continue monthly CBC's. santiago

## 2017-09-18 ENCOUNTER — TELEPHONE (OUTPATIENT)
Dept: GASTROENTEROLOGY | Facility: CLINIC | Age: 72
End: 2017-09-18

## 2017-09-18 NOTE — TELEPHONE ENCOUNTER
----- Message from Anil Prakash MD sent at 9/17/2017  5:11 PM EDT -----  Tell him that his Hgb 2 days ago was 13.7, which is even better, which is good. Continue the iron pills and continue monthly CBC's. shine

## 2017-09-18 NOTE — TELEPHONE ENCOUNTER
Called pt and on identified vm advised per Dr Olinda alberts this most recent cbc looks even better.  Advised to continue taking iron pills and continue getting monthly cbc's.  Advised pt to call with any questions and to call to make lab appt.

## 2017-10-06 ENCOUNTER — OFFICE VISIT (OUTPATIENT)
Dept: FAMILY MEDICINE CLINIC | Facility: CLINIC | Age: 72
End: 2017-10-06

## 2017-10-06 VITALS
TEMPERATURE: 98 F | WEIGHT: 242.2 LBS | HEART RATE: 73 BPM | BODY MASS INDEX: 34.67 KG/M2 | OXYGEN SATURATION: 96 % | SYSTOLIC BLOOD PRESSURE: 142 MMHG | HEIGHT: 70 IN | DIASTOLIC BLOOD PRESSURE: 76 MMHG

## 2017-10-06 DIAGNOSIS — E78.01 FAMILIAL HYPERCHOLESTEROLEMIA: ICD-10-CM

## 2017-10-06 DIAGNOSIS — E11.9 TYPE 2 DIABETES MELLITUS WITHOUT COMPLICATION, WITHOUT LONG-TERM CURRENT USE OF INSULIN (HCC): ICD-10-CM

## 2017-10-06 DIAGNOSIS — I10 ESSENTIAL HYPERTENSION: ICD-10-CM

## 2017-10-06 DIAGNOSIS — F52.21 ERECTILE DYSFUNCTION OF NONORGANIC ORIGIN: ICD-10-CM

## 2017-10-06 DIAGNOSIS — K21.9 GASTROESOPHAGEAL REFLUX DISEASE, ESOPHAGITIS PRESENCE NOT SPECIFIED: ICD-10-CM

## 2017-10-06 DIAGNOSIS — N42.9 PROSTATE DISORDER: ICD-10-CM

## 2017-10-06 DIAGNOSIS — F41.9 ANXIETY: Primary | ICD-10-CM

## 2017-10-06 PROCEDURE — 99214 OFFICE O/P EST MOD 30 MIN: CPT | Performed by: NURSE PRACTITIONER

## 2017-10-06 RX ORDER — OMEPRAZOLE 40 MG/1
CAPSULE, DELAYED RELEASE ORAL
COMMUNITY
Start: 2017-10-02 | End: 2017-10-06 | Stop reason: SDUPTHER

## 2017-10-06 RX ORDER — OMEPRAZOLE 40 MG/1
40 CAPSULE, DELAYED RELEASE ORAL DAILY
Qty: 90 CAPSULE | Refills: 3 | Status: SHIPPED | OUTPATIENT
Start: 2017-10-06 | End: 2018-10-04 | Stop reason: SDUPTHER

## 2017-10-06 RX ORDER — BUPROPION HYDROCHLORIDE 300 MG/1
300 TABLET ORAL DAILY
Qty: 90 TABLET | Refills: 3 | Status: SHIPPED | OUTPATIENT
Start: 2017-10-06 | End: 2018-09-17 | Stop reason: SDUPTHER

## 2017-10-06 RX ORDER — TADALAFIL 20 MG/1
20 TABLET ORAL DAILY PRN
Qty: 9 TABLET | Refills: 3 | Status: SHIPPED | OUTPATIENT
Start: 2017-10-06 | End: 2018-07-13

## 2017-10-06 RX ORDER — LISINOPRIL 5 MG/1
TABLET ORAL
COMMUNITY
Start: 2017-09-17 | End: 2017-10-06

## 2017-10-06 RX ORDER — AMLODIPINE BESYLATE 10 MG/1
10 TABLET ORAL DAILY
Qty: 90 TABLET | Refills: 3 | Status: SHIPPED | OUTPATIENT
Start: 2017-10-06 | End: 2018-03-10

## 2017-10-06 RX ORDER — PIOGLITAZONEHYDROCHLORIDE 45 MG/1
45 TABLET ORAL DAILY
Qty: 90 TABLET | Refills: 3 | Status: SHIPPED | OUTPATIENT
Start: 2017-10-06 | End: 2018-10-04 | Stop reason: SDUPTHER

## 2017-10-06 RX ORDER — LISINOPRIL 10 MG/1
10 TABLET ORAL DAILY
Qty: 90 TABLET | Refills: 3 | Status: SHIPPED | OUTPATIENT
Start: 2017-10-06 | End: 2018-01-29

## 2017-10-06 NOTE — PROGRESS NOTES
"Subjective   Danyel Dash is a 71 y.o. male.     History of Present Illness   Danyel Dash 71 y.o. male who presents today for routine follow up check and medication refills.  he has a history of   Patient Active Problem List   Diagnosis   • Anemia   • Anxiety   • Chronic kidney disease, stage III (moderate)   • Diabetes mellitus   • Erectile dysfunction of nonorganic origin   • Gastroesophageal reflux disease   • Hyperlipidemia   • Essential hypertension   • Prostate disorder   .  Since the last visit, he has overall felt well.  He has Hypertenision and is well controlled on medication, DMII and is well controlled on medication, GERD and is well controlled on PPI medication and Hyperlipidemia and here to discuss treatment.  he has been compliant with current medications have reviewed them.  The patient denies medication side effects.        The following portions of the patient's history were reviewed and updated as appropriate: allergies, current medications, past social history and problem list.    Review of Systems   All other systems reviewed and are negative.      Objective   /76  Pulse 73  Temp 98 °F (36.7 °C)  Ht 69.5\" (176.5 cm)  Wt 242 lb 3.2 oz (110 kg)  SpO2 96%  BMI 35.25 kg/m2  Physical Exam   Constitutional: He is oriented to person, place, and time. Vital signs are normal. He appears well-developed and well-nourished. No distress.   HENT:   Head: Normocephalic.   Cardiovascular: Normal rate, regular rhythm and normal heart sounds.    Pulmonary/Chest: Effort normal and breath sounds normal.   Neurological: He is alert and oriented to person, place, and time. Gait normal.   Psychiatric: He has a normal mood and affect. His behavior is normal. Judgment and thought content normal.   Vitals reviewed.      Assessment/Plan   Problem List Items Addressed This Visit        Cardiovascular and Mediastinum    Hyperlipidemia    Relevant Orders    Comprehensive Metabolic Panel    Lipid Panel With " LDL / HDL Ratio    Essential hypertension    Relevant Medications    amLODIPine (NORVASC) 10 MG tablet    lisinopril (PRINIVIL,ZESTRIL) 10 MG tablet    Other Relevant Orders    MicroAlbumin, Urine, Random - Urine, Clean Catch       Digestive    Gastroesophageal reflux disease    Relevant Medications    omeprazole (priLOSEC) 40 MG capsule       Endocrine    Diabetes mellitus    Relevant Medications    pioglitazone (ACTOS) 45 MG tablet    Other Relevant Orders    Hemoglobin A1c       Genitourinary    Prostate disorder    Relevant Orders    PSA       Other    Anxiety - Primary    Relevant Medications    buPROPion XL (WELLBUTRIN XL) 300 MG 24 hr tablet    Erectile dysfunction of nonorganic origin    Relevant Medications    buPROPion XL (WELLBUTRIN XL) 300 MG 24 hr tablet    tadalafil (CIALIS) 20 MG tablet           follow up after labs   Cont same as is

## 2017-10-07 LAB
ALBUMIN SERPL-MCNC: 4.5 G/DL (ref 3.5–5.2)
ALBUMIN/GLOB SERPL: 2 G/DL
ALP SERPL-CCNC: 50 U/L (ref 39–117)
ALT SERPL-CCNC: 22 U/L (ref 1–41)
AST SERPL-CCNC: 14 U/L (ref 1–40)
BILIRUB SERPL-MCNC: 0.3 MG/DL (ref 0.1–1.2)
BUN SERPL-MCNC: 15 MG/DL (ref 8–23)
BUN/CREAT SERPL: 11.9 (ref 7–25)
CALCIUM SERPL-MCNC: 9.9 MG/DL (ref 8.6–10.5)
CHLORIDE SERPL-SCNC: 101 MMOL/L (ref 98–107)
CHOLEST SERPL-MCNC: 189 MG/DL (ref 0–200)
CO2 SERPL-SCNC: 25 MMOL/L (ref 22–29)
CREAT SERPL-MCNC: 1.26 MG/DL (ref 0.76–1.27)
GLOBULIN SER CALC-MCNC: 2.2 GM/DL
GLUCOSE SERPL-MCNC: 137 MG/DL (ref 65–99)
HBA1C MFR BLD: 5.73 % (ref 4.8–5.6)
HDLC SERPL-MCNC: 34 MG/DL (ref 40–60)
LDLC SERPL CALC-MCNC: 124 MG/DL (ref 0–100)
LDLC/HDLC SERPL: 3.64 {RATIO}
MICROALBUMIN UR-MCNC: 3.4 UG/ML
POTASSIUM SERPL-SCNC: 4.6 MMOL/L (ref 3.5–5.2)
PROT SERPL-MCNC: 6.7 G/DL (ref 6–8.5)
PSA SERPL-MCNC: 0.54 NG/ML (ref 0–4)
SODIUM SERPL-SCNC: 138 MMOL/L (ref 136–145)
TRIGL SERPL-MCNC: 157 MG/DL (ref 0–150)
VLDLC SERPL CALC-MCNC: 31.4 MG/DL (ref 5–40)

## 2017-10-18 ENCOUNTER — TELEPHONE (OUTPATIENT)
Dept: GASTROENTEROLOGY | Facility: CLINIC | Age: 72
End: 2017-10-18

## 2017-10-18 DIAGNOSIS — D64.9 ANEMIA, UNSPECIFIED TYPE: Primary | ICD-10-CM

## 2017-10-19 LAB
BASOPHILS # BLD AUTO: 0.02 10*3/MM3 (ref 0–0.2)
BASOPHILS NFR BLD AUTO: 0.2 % (ref 0–1.5)
DIFFERENTIAL COMMENT: NORMAL
EOSINOPHIL # BLD AUTO: 0.09 10*3/MM3 (ref 0–0.7)
EOSINOPHIL NFR BLD AUTO: 1 % (ref 0.3–6.2)
ERYTHROCYTE [DISTWIDTH] IN BLOOD BY AUTOMATED COUNT: 13.1 % (ref 11.5–14.5)
HCT VFR BLD AUTO: 44.8 % (ref 40.4–52.2)
HGB BLD-MCNC: 14.3 G/DL (ref 13.7–17.6)
IMM GRANULOCYTES # BLD: 0.02 10*3/MM3 (ref 0–0.03)
IMM GRANULOCYTES NFR BLD: 0.2 % (ref 0–0.5)
LYMPHOCYTES # BLD AUTO: 1.19 10*3/MM3 (ref 0.9–4.8)
LYMPHOCYTES NFR BLD AUTO: 13.1 % (ref 19.6–45.3)
MCH RBC QN AUTO: 30.8 PG (ref 27–32.7)
MCHC RBC AUTO-ENTMCNC: 31.9 G/DL (ref 32.6–36.4)
MCV RBC AUTO: 96.3 FL (ref 79.8–96.2)
MONOCYTES # BLD AUTO: 0.42 10*3/MM3 (ref 0.2–1.2)
MONOCYTES NFR BLD AUTO: 4.6 % (ref 5–12)
NEUTROPHILS # BLD AUTO: 7.34 10*3/MM3 (ref 1.9–8.1)
NEUTROPHILS NFR BLD AUTO: 80.9 % (ref 42.7–76)
PLATELET # BLD AUTO: 338 10*3/MM3 (ref 140–500)
PLATELET BLD QL SMEAR: NORMAL
RBC # BLD AUTO: 4.65 10*6/MM3 (ref 4.6–6)
RBC MORPH BLD: NORMAL
WBC # BLD AUTO: 9.08 10*3/MM3 (ref 4.5–10.7)

## 2017-10-23 ENCOUNTER — TELEPHONE (OUTPATIENT)
Dept: GASTROENTEROLOGY | Facility: CLINIC | Age: 72
End: 2017-10-23

## 2017-10-23 NOTE — TELEPHONE ENCOUNTER
Tell him that his most recent CBC (from 10/19/17) looked even better with a normal Hgb of 14.3. Continue taking iron pills and continue checking monthly CBC's. levi

## 2017-10-23 NOTE — TELEPHONE ENCOUNTER
----- Message from Anil Prakash MD sent at 10/23/2017  2:17 PM EDT -----  Tell him that his most recent CBC (from 10/19/17) looked even better with a normal Hgb of 14.3. Continue taking iron pills and continue checking monthly CBC's. levi

## 2017-10-24 NOTE — TELEPHONE ENCOUNTER
Pt called and advised per Dr Prakash that his most recent cbc 10/19/2017 looked even better with a normal hgb of 14.3 .  Continue taking iron pills and continue checking monthly cbc's.  Pt verb understanding.

## 2017-11-02 ENCOUNTER — OFFICE VISIT (OUTPATIENT)
Dept: GASTROENTEROLOGY | Facility: CLINIC | Age: 72
End: 2017-11-02

## 2017-11-02 VITALS
WEIGHT: 242 LBS | SYSTOLIC BLOOD PRESSURE: 122 MMHG | DIASTOLIC BLOOD PRESSURE: 76 MMHG | HEIGHT: 70 IN | TEMPERATURE: 98.2 F | BODY MASS INDEX: 34.65 KG/M2

## 2017-11-02 DIAGNOSIS — R19.5 OCCULT GI BLEEDING: ICD-10-CM

## 2017-11-02 DIAGNOSIS — D50.0 IRON DEFICIENCY ANEMIA DUE TO CHRONIC BLOOD LOSS: Primary | ICD-10-CM

## 2017-11-02 PROCEDURE — 99213 OFFICE O/P EST LOW 20 MIN: CPT | Performed by: INTERNAL MEDICINE

## 2017-11-02 NOTE — PROGRESS NOTES
Chief Complaint   Patient presents with   • Follow-up       History of Present Illness: His most recent CBC done 10/19/17 showed a Hgb of 14.3 which is up from where it had been. He has GI bleeding of obscure etiology. 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 He had an EGD with small bowel enteroscopy 7/17 where I burned some prox small bowel AVM's. He has iron def anemia but is doing well on iron pills (Ferrous gluconate) with vit C TID. Stool is dark. NO rectal bleeding. No diarrhea or constipation. NO abdominal or chest pain. No nausea or vomting. He is on one baby aspririn/day. Weight is increasing. No SOA or lightheaded or dizzy.      Past Medical History:   Diagnosis Date   • Anemia    • Chronic kidney disease    • Coronary artery disease     rheumatic fever at age 7   • Diabetes mellitus    • Hypertension    • Low back pain        Past Surgical History:   Procedure Laterality Date   • COLONOSCOPY N/A 12/27/2016    tics, IH, polyps   • ENDOSCOPY N/A 12/27/2016    LA Grade B reflux esophagitis, erythematous mucosa in stomach   • ENDOSCOPY N/A 7/7/2017    Two jejunal AVM's    • ENTEROSCOPY SMALL BOWEL  7/7/2017    Procedure: ENTEROSCOPY SMALL BOWEL;  Surgeon: Anil Prakash MD;  Location: [facility] ENDOSCOPY;  Service:    • EPIDURAL BLOCK     • KNEE ARTHROSCOPY Right    • SHOULDER ARTHROSCOPY Right    • TONSILLECTOMY           Current Outpatient Prescriptions:   •  amLODIPine (NORVASC) 10 MG tablet, Take 1 tablet by mouth Daily., Disp: 90 tablet, Rfl: 3  •  aspirin 81 MG EC tablet, Take 81 mg by mouth Daily., Disp: , Rfl:   •  B Complex Vitamins (VITAMIN B COMPLEX) tablet, Take 1 tablet by mouth Daily., Disp: , Rfl:   •  Blood Gluc Meter Disp-Strips device, Pt to monitor blood glucose two times daily, Disp: 1 each, Rfl: 0  •  buPROPion XL (WELLBUTRIN XL) 300 MG 24 hr tablet, Take 1 tablet by mouth Daily., Disp: 90 tablet, Rfl: 3  •  Cholecalciferol (VITAMIN D-3) 1000  UNITS capsule, Take 1,000 Units by mouth Daily., Disp: , Rfl:   •  Iron, Ferrous Gluconate, 256 (28 FE) MG tablet, Take 1 tablet by mouth daily., Disp: , Rfl:   •  lisinopril (PRINIVIL,ZESTRIL) 10 MG tablet, Take 1 tablet by mouth Daily., Disp: 90 tablet, Rfl: 3  •  omeprazole (priLOSEC) 40 MG capsule, Take 1 capsule by mouth Daily., Disp: 90 capsule, Rfl: 3  •  pioglitazone (ACTOS) 45 MG tablet, Take 1 tablet by mouth Daily., Disp: 90 tablet, Rfl: 3  •  tadalafil (CIALIS) 20 MG tablet, Take 1 tablet by mouth Daily As Needed for erectile dysfunction., Disp: 9 tablet, Rfl: 3    No Known Allergies    Family History   Problem Relation Age of Onset   • Diverticulosis Father        Social History     Social History   • Marital status:      Spouse name: N/A   • Number of children: N/A   • Years of education: N/A     Occupational History   • Not on file.     Social History Main Topics   • Smoking status: Former Smoker   • Smokeless tobacco: Never Used      Comment: quit 1997   • Alcohol use No   • Drug use: No   • Sexual activity: Defer     Other Topics Concern   • Not on file     Social History Narrative       Review of Systems   All other systems reviewed and are negative.      Vitals:    11/02/17 1008   BP: 122/76   Temp: 98.2 °F (36.8 °C)       Physical Exam   Constitutional: He is oriented to person, place, and time. He appears well-developed and well-nourished. No distress.   HENT:   Head: Normocephalic and atraumatic. Hair is normal.   Right Ear: Hearing, tympanic membrane, external ear and ear canal normal.   Left Ear: Hearing, tympanic membrane, external ear and ear canal normal.   Nose: No sinus tenderness or nasal deformity.   Mouth/Throat: Uvula is midline, oropharynx is clear and moist and mucous membranes are normal. No oral lesions. No uvula swelling.   Eyes: Conjunctivae, EOM and lids are normal. Pupils are equal, round, and reactive to light. Right eye exhibits no discharge. Left eye exhibits no  discharge. No scleral icterus. Right eye exhibits normal extraocular motion and no nystagmus. Left eye exhibits normal extraocular motion and no nystagmus.   Neck: Normal range of motion. Neck supple. No JVD present. No thyromegaly present.   Cardiovascular: Normal rate, regular rhythm, normal heart sounds, intact distal pulses and normal pulses.  Exam reveals no gallop.    No murmur heard.  Pulmonary/Chest: Effort normal and breath sounds normal. No respiratory distress. He has no wheezes. He has no rales. He exhibits no tenderness.   Abdominal: Soft. Bowel sounds are normal. He exhibits no distension and no mass. There is no tenderness. There is no guarding. No hernia.   Genitourinary: Rectal exam shows guaiac positive stool.   Genitourinary Comments: Normal anorectal exam.   Musculoskeletal: Normal range of motion. He exhibits no edema, tenderness or deformity.   Lymphadenopathy:     He has no cervical adenopathy.   Neurological: He is alert and oriented to person, place, and time. He has normal reflexes. He displays normal reflexes. No cranial nerve deficit. He exhibits normal muscle tone. Coordination normal.   Skin: Skin is warm and dry. No rash noted. He is not diaphoretic.   Psychiatric: He has a normal mood and affect. His behavior is normal. Judgment and thought content normal.   Vitals reviewed.      Danyel was seen today for follow-up.    Diagnoses and all orders for this visit:    Iron deficiency anemia due to chronic blood loss    Occult GI bleeding     Assessment:  1) GI bleeding of obscure etiology. 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 He had an EGD with small bowel enteroscopy 7/17 where I burned some prox small bowel AVM's.   2) Iron deficiency anemia. His stool heme pos today.  3) GERD    Recommendations:  1) Continue monthly CBC's and Ferritin  2) f/u 3 mos.    Return in about 3 months (around 2/2/2018).    Anil Prakash MD  11/2/2017

## 2017-11-29 ENCOUNTER — TELEPHONE (OUTPATIENT)
Dept: GASTROENTEROLOGY | Facility: CLINIC | Age: 72
End: 2017-11-29

## 2017-11-29 DIAGNOSIS — D64.9 ANEMIA, UNSPECIFIED TYPE: Primary | ICD-10-CM

## 2017-12-06 LAB
BASOPHILS # BLD AUTO: 0.03 10*3/MM3 (ref 0–0.2)
BASOPHILS NFR BLD AUTO: 0.4 % (ref 0–1.5)
EOSINOPHIL # BLD AUTO: 0.12 10*3/MM3 (ref 0–0.7)
EOSINOPHIL NFR BLD AUTO: 1.6 % (ref 0.3–6.2)
ERYTHROCYTE [DISTWIDTH] IN BLOOD BY AUTOMATED COUNT: 14.2 % (ref 11.5–14.5)
FERRITIN SERPL-MCNC: 37.92 NG/ML (ref 30–400)
HCT VFR BLD AUTO: 42.1 % (ref 40.4–52.2)
HGB BLD-MCNC: 13.4 G/DL (ref 13.7–17.6)
IMM GRANULOCYTES # BLD: 0.06 10*3/MM3 (ref 0–0.03)
IMM GRANULOCYTES NFR BLD: 0.8 % (ref 0–0.5)
LYMPHOCYTES # BLD AUTO: 1.51 10*3/MM3 (ref 0.9–4.8)
LYMPHOCYTES NFR BLD AUTO: 20.3 % (ref 19.6–45.3)
MCH RBC QN AUTO: 31.1 PG (ref 27–32.7)
MCHC RBC AUTO-ENTMCNC: 31.8 G/DL (ref 32.6–36.4)
MCV RBC AUTO: 97.7 FL (ref 79.8–96.2)
MONOCYTES # BLD AUTO: 0.43 10*3/MM3 (ref 0.2–1.2)
MONOCYTES NFR BLD AUTO: 5.8 % (ref 5–12)
NEUTROPHILS # BLD AUTO: 5.3 10*3/MM3 (ref 1.9–8.1)
NEUTROPHILS NFR BLD AUTO: 71.1 % (ref 42.7–76)
PLATELET # BLD AUTO: 379 10*3/MM3 (ref 140–500)
RBC # BLD AUTO: 4.31 10*6/MM3 (ref 4.6–6)
WBC # BLD AUTO: 7.45 10*3/MM3 (ref 4.5–10.7)

## 2017-12-07 ENCOUNTER — TELEPHONE (OUTPATIENT)
Dept: GASTROENTEROLOGY | Facility: CLINIC | Age: 72
End: 2017-12-07

## 2017-12-07 NOTE — TELEPHONE ENCOUNTER
----- Message from Anil Prakash MD sent at 12/6/2017  8:23 PM EST -----  Tell him that his most recent CBC showed a Hgb of 13.4 which is down slightly from one month ago when his Hgb was 14.3. His ferritin (iron) level is low normal at 37.9. Continue taking iron pills with vitamin C TID. Continue monthly CBC's and ferritin levels. Thx. kjrupesh

## 2017-12-08 NOTE — TELEPHONE ENCOUNTER
Call from pt.  Advise per DR Prakash that most recent CBC showed a Hgb of 13.4, which is down slightly from one month ago when it was 14.3.  Ferritin level is low normal at 37.9.  Continue taking iron pills with Vit C three times a day.  Continue monthly CBC's and ferritin levels.  Pt verb understanding.

## 2017-12-29 ENCOUNTER — TELEPHONE (OUTPATIENT)
Dept: GASTROENTEROLOGY | Facility: CLINIC | Age: 72
End: 2017-12-29

## 2017-12-29 DIAGNOSIS — D64.9 ANEMIA, UNSPECIFIED TYPE: Primary | ICD-10-CM

## 2017-12-29 LAB
BASOPHILS # BLD AUTO: 0.04 10*3/MM3 (ref 0–0.2)
BASOPHILS NFR BLD AUTO: 0.6 % (ref 0–1.5)
EOSINOPHIL # BLD AUTO: 0.11 10*3/MM3 (ref 0–0.7)
EOSINOPHIL NFR BLD AUTO: 1.7 % (ref 0.3–6.2)
ERYTHROCYTE [DISTWIDTH] IN BLOOD BY AUTOMATED COUNT: 14 % (ref 11.5–14.5)
FERRITIN SERPL-MCNC: 31.6 NG/ML (ref 30–400)
HCT VFR BLD AUTO: 40.7 % (ref 40.4–52.2)
HGB BLD-MCNC: 12.8 G/DL (ref 13.7–17.6)
IMM GRANULOCYTES # BLD: 0.02 10*3/MM3 (ref 0–0.03)
IMM GRANULOCYTES NFR BLD: 0.3 % (ref 0–0.5)
LYMPHOCYTES # BLD AUTO: 1.22 10*3/MM3 (ref 0.9–4.8)
LYMPHOCYTES NFR BLD AUTO: 18.7 % (ref 19.6–45.3)
MCH RBC QN AUTO: 30.7 PG (ref 27–32.7)
MCHC RBC AUTO-ENTMCNC: 31.4 G/DL (ref 32.6–36.4)
MCV RBC AUTO: 97.6 FL (ref 79.8–96.2)
MONOCYTES # BLD AUTO: 0.59 10*3/MM3 (ref 0.2–1.2)
MONOCYTES NFR BLD AUTO: 9 % (ref 5–12)
NEUTROPHILS # BLD AUTO: 4.55 10*3/MM3 (ref 1.9–8.1)
NEUTROPHILS NFR BLD AUTO: 69.7 % (ref 42.7–76)
PLATELET # BLD AUTO: 352 10*3/MM3 (ref 140–500)
RBC # BLD AUTO: 4.17 10*6/MM3 (ref 4.6–6)
WBC # BLD AUTO: 6.53 10*3/MM3 (ref 4.5–10.7)

## 2018-01-02 ENCOUNTER — TELEPHONE (OUTPATIENT)
Dept: GASTROENTEROLOGY | Facility: CLINIC | Age: 73
End: 2018-01-02

## 2018-01-02 NOTE — TELEPHONE ENCOUNTER
----- Message from Anil Prakash MD sent at 1/1/2018  9:28 AM EST -----  Tell him that his most recent labs show that his Hgb is lower at 12.8. His iron level (ferritin) is lower also. I think that he should see a Hematologist (Dr. Pacheco, et al @ [facility]) to have them evaluate his anemia and possibly give him some IV iron. If he is in agreement then please schedule him to see one of the Hematologists at [facility]. thx.kjh

## 2018-01-02 NOTE — TELEPHONE ENCOUNTER
Call to pt.  Advise per DR Prakash that most recent labs shows that Hgb is lower at 12.8.  Iron level (ferritin) is lower also.  Dr Prakash thinks should see a Hematologist to have them evaluate anemia and possible give IV iron.    Pt verb understanding and is in agreement for referral to CBC.  States is in [place] now - will be back 1/10/18, and then will leave country last 2 weeks of February.    Message to Scheduling.

## 2018-01-25 ENCOUNTER — TELEPHONE (OUTPATIENT)
Dept: ORTHOPEDIC SURGERY | Facility: CLINIC | Age: 73
End: 2018-01-25

## 2018-01-26 DIAGNOSIS — M48.061 SPINAL STENOSIS OF LUMBAR REGION, UNSPECIFIED WHETHER NEUROGENIC CLAUDICATION PRESENT: Primary | ICD-10-CM

## 2018-01-26 NOTE — TELEPHONE ENCOUNTER
Order placed for epidurals; spoke with pt and informed.  He is aware someone will call him to schedule it.

## 2018-01-29 ENCOUNTER — CONSULT (OUTPATIENT)
Dept: ONCOLOGY | Facility: CLINIC | Age: 73
End: 2018-01-29

## 2018-01-29 ENCOUNTER — LAB (OUTPATIENT)
Dept: LAB | Facility: HOSPITAL | Age: 73
End: 2018-01-29

## 2018-01-29 VITALS
SYSTOLIC BLOOD PRESSURE: 164 MMHG | HEIGHT: 70 IN | HEART RATE: 80 BPM | BODY MASS INDEX: 36.36 KG/M2 | OXYGEN SATURATION: 96 % | TEMPERATURE: 98.3 F | WEIGHT: 254 LBS | RESPIRATION RATE: 16 BRPM | DIASTOLIC BLOOD PRESSURE: 72 MMHG

## 2018-01-29 DIAGNOSIS — D50.8 OTHER IRON DEFICIENCY ANEMIA: Primary | ICD-10-CM

## 2018-01-29 LAB
BASOPHILS # BLD AUTO: 0.06 10*3/MM3 (ref 0–0.1)
BASOPHILS NFR BLD AUTO: 1 % (ref 0–1.1)
DEPRECATED RDW RBC AUTO: 44.4 FL (ref 37–49)
EOSINOPHIL # BLD AUTO: 0.14 10*3/MM3 (ref 0–0.36)
EOSINOPHIL NFR BLD AUTO: 2.3 % (ref 1–5)
ERYTHROCYTE [DISTWIDTH] IN BLOOD BY AUTOMATED COUNT: 12.9 % (ref 11.7–14.5)
FERRITIN SERPL-MCNC: 27.3 NG/ML (ref 30–400)
HCT VFR BLD AUTO: 40.8 % (ref 40–49)
HGB BLD-MCNC: 13.7 G/DL (ref 13.5–16.5)
IMM GRANULOCYTES # BLD: 0.11 10*3/MM3 (ref 0–0.03)
IMM GRANULOCYTES NFR BLD: 1.8 % (ref 0–0.5)
IRON 24H UR-MRATE: 253 MCG/DL (ref 59–158)
IRON SATN MFR SERPL: 62 % (ref 14–48)
LYMPHOCYTES # BLD AUTO: 1.05 10*3/MM3 (ref 1–3.5)
LYMPHOCYTES NFR BLD AUTO: 17.1 % (ref 20–49)
MCH RBC QN AUTO: 31.3 PG (ref 27–33)
MCHC RBC AUTO-ENTMCNC: 33.6 G/DL (ref 32–35)
MCV RBC AUTO: 93.2 FL (ref 83–97)
MONOCYTES # BLD AUTO: 0.42 10*3/MM3 (ref 0.25–0.8)
MONOCYTES NFR BLD AUTO: 6.9 % (ref 4–12)
NEUTROPHILS # BLD AUTO: 4.35 10*3/MM3 (ref 1.5–7)
NEUTROPHILS NFR BLD AUTO: 70.9 % (ref 39–75)
NRBC BLD MANUAL-RTO: 0 /100 WBC (ref 0–0)
PLATELET # BLD AUTO: 347 10*3/MM3 (ref 150–375)
PMV BLD AUTO: 9.2 FL (ref 8.9–12.1)
RBC # BLD AUTO: 4.38 10*6/MM3 (ref 4.3–5.5)
TIBC SERPL-MCNC: 409 MCG/DL (ref 249–505)
TRANSFERRIN SERPL-MCNC: 292 MG/DL (ref 200–360)
WBC NRBC COR # BLD: 6.13 10*3/MM3 (ref 4–10)

## 2018-01-29 PROCEDURE — 85025 COMPLETE CBC W/AUTO DIFF WBC: CPT | Performed by: INTERNAL MEDICINE

## 2018-01-29 PROCEDURE — 99204 OFFICE O/P NEW MOD 45 MIN: CPT | Performed by: INTERNAL MEDICINE

## 2018-01-29 PROCEDURE — 83540 ASSAY OF IRON: CPT | Performed by: INTERNAL MEDICINE

## 2018-01-29 PROCEDURE — 36416 COLLJ CAPILLARY BLOOD SPEC: CPT | Performed by: INTERNAL MEDICINE

## 2018-01-29 PROCEDURE — 82728 ASSAY OF FERRITIN: CPT | Performed by: INTERNAL MEDICINE

## 2018-01-29 PROCEDURE — 36415 COLL VENOUS BLD VENIPUNCTURE: CPT | Performed by: INTERNAL MEDICINE

## 2018-01-29 PROCEDURE — 84466 ASSAY OF TRANSFERRIN: CPT | Performed by: INTERNAL MEDICINE

## 2018-01-29 RX ORDER — LANOLIN ALCOHOL/MO/W.PET/CERES
1000 CREAM (GRAM) TOPICAL DAILY
COMMUNITY

## 2018-01-29 RX ORDER — LISINOPRIL 5 MG/1
5 TABLET ORAL DAILY
COMMUNITY
End: 2018-08-09

## 2018-01-29 NOTE — PROGRESS NOTES
"History:     Reason For Consultation:   Iron deficiency anemia    Referring Provider:   Anil Prakash MD  [address]  [address]  [place] [address]    Records Obtained: Records of the patients history including those obtained from the referring provider were reviewed and summarized in detail.    HPI:   Danyel Dash presents for consultation of iron deficiency anemia.  Patient has been following with his gastroenterologist, Dr. Prakash, for iron deficiency anemia secondary to severe GI bleeding with capsule endoscopy showing AV malformations.  Patient has undergone cauterization with some success.  He has been on oral ferrous gluconate 3 times a day with vitamin C which he tolerates fairly well.  When he becomes progressively anemic he becomes pale with extreme fatigue particularly on exertion and \"grumpy\".  Very recently he has not noticed dark black stools.    In regards to his hemoglobin trend, he was noted to be 7.4 back in December 2016.  He received transfusion and started iron and was sent to Dr. Conde where evaluation was undertaken.  The EGD and colonoscopy were unremarkable but the capsule revealed AVMs.  His hemoglobin has a waxing and waning pattern has gotten up to 14 and then slowly declined again down to the 11 range.  Today his hemoglobin is 13.7.  Recently he had rare dim levels checked on December 29 and ferritin was 31.  He complains of fatigue but no chest pain or shortness of breath.      Past Medical   Past Medical History:   Diagnosis Date   • Anemia    • Chronic kidney disease    • Coronary artery disease     rheumatic fever at age 7   • Diabetes mellitus    • Hypertension    • Low back pain      Patient Active Problem List   Diagnosis   • Anemia   • Anxiety   • Chronic kidney disease, stage III (moderate)   • Diabetes mellitus   • Erectile dysfunction of nonorganic origin   • Gastroesophageal reflux disease   • Hyperlipidemia   • Essential hypertension   • Prostate disorder     Social " History   Social History     Social History   • Marital status:      Spouse name: N/A   • Number of children: N/A   • Years of education: N/A     Occupational History   •  Retired     Social History Main Topics   • Smoking status: Former Smoker   • Smokeless tobacco: Never Used      Comment: quit 1997   • Alcohol use No   • Drug use: No   • Sexual activity: Defer     Other Topics Concern   • Not on file     Social History Narrative     Family History  Family History   Problem Relation Age of Onset   • Diverticulosis Father      Medications    Current Outpatient Prescriptions:   •  amLODIPine (NORVASC) 10 MG tablet, Take 1 tablet by mouth Daily., Disp: 90 tablet, Rfl: 3  •  aspirin 81 MG EC tablet, Take 81 mg by mouth Daily., Disp: , Rfl:   •  Blood Gluc Meter Disp-Strips device, Pt to monitor blood glucose two times daily, Disp: 1 each, Rfl: 0  •  buPROPion XL (WELLBUTRIN XL) 300 MG 24 hr tablet, Take 1 tablet by mouth Daily., Disp: 90 tablet, Rfl: 3  •  Cholecalciferol (VITAMIN D-3) 1000 UNITS capsule, Take 1,000 Units by mouth Daily., Disp: , Rfl:   •  Iron, Ferrous Gluconate, 256 (28 FE) MG tablet, Take 1 tablet by mouth daily., Disp: , Rfl:   •  lisinopril (PRINIVIL,ZESTRIL) 5 MG tablet, Take 5 mg by mouth Daily., Disp: , Rfl:   •  omeprazole (priLOSEC) 40 MG capsule, Take 1 capsule by mouth Daily., Disp: 90 capsule, Rfl: 3  •  pioglitazone (ACTOS) 45 MG tablet, Take 1 tablet by mouth Daily., Disp: 90 tablet, Rfl: 3  •  tadalafil (CIALIS) 20 MG tablet, Take 1 tablet by mouth Daily As Needed for erectile dysfunction., Disp: 9 tablet, Rfl: 3  •  vitamin B-12 (CYANOCOBALAMIN) 1000 MCG tablet, Take 1,000 mcg by mouth Daily., Disp: , Rfl:   Allergies  No Known Allergies  Review of Systems  Review of Systems   Constitutional: Positive for diaphoresis (night sweats), fatigue and unexpected weight change (weight gain). Negative for activity change and fever.   HENT: Negative for congestion, hearing loss, mouth  "sores, nosebleeds, sinus pressure, sore throat and trouble swallowing.    Respiratory: Negative for cough, chest tightness, shortness of breath and wheezing.    Cardiovascular: Negative for chest pain, palpitations and leg swelling.   Gastrointestinal: Negative for abdominal distention, abdominal pain, blood in stool, constipation, diarrhea, nausea and vomiting.   Endocrine: Negative for heat intolerance.   Genitourinary: Negative for difficulty urinating, dysuria, frequency, hematuria and urgency.   Musculoskeletal: Negative for arthralgias, back pain and joint swelling.   Skin: Negative for color change and rash.   Neurological: Positive for numbness. Negative for dizziness, weakness, light-headedness and headaches.   Hematological: Negative for adenopathy. Does not bruise/bleed easily.   Psychiatric/Behavioral: Negative for agitation, confusion and sleep disturbance. The patient is not nervous/anxious.         Objective     Objective:     Vitals:    01/29/18 1025   BP: 164/72   Pulse: 80   Resp: 16   Temp: 98.3 °F (36.8 °C)   TempSrc: Oral   SpO2: 96%   Weight: 115 kg (254 lb)   Height: 177 cm (69.69\")  Comment: new ht w/shoes   PainSc: 0-No pain     Current Status 1/29/2018   ECOG score 0     GENERAL:  Well-developed, well-nourished male in no acute distress. Vital signs reviewed.   SKIN:  Warm, dry without rashes, purpura or petechiae.  EYES:  Pupils equal, round and reactive to light.  EOMs intact.  Conjunctivae normal.  HEAD:  Normocephalic.  EARS/NOSE/MOUTH/THROAT:  Hearing intact. Septum midline.  No excoriations or nasal discharge. No stomatitis or ulcers.  Lips normal. Oropharynx without lesions or exudates.  NECK:  Supple with good range of motion; no thyromegaly or masses  LYMPHATICS:  No cervical, supraclavicular, axillary adenopathy.  RESP:  Lungs clear to percussion and auscultation. Good airflow. Normal respiratory effort.   CARDIAC:  Regular rate and rhythm without murmurs, rubs or gallops. Normal " S1,S2. No edema  GI:  Soft, nontender, no hepatosplenomegaly, normal bowel sounds  MSK:  No clubbing or cyanosis, No joint swelling noted in hands  NEUROLOGICAL:  Cranial Nerves II-XII grossly intact.  No focal neurological deficits.  PSYCHIATRIC:  Normal affect and mood.  Alert and Oriented x 3.       Labs and Imaging  Results for orders placed or performed in visit on 01/29/18   CBC Auto Differential   Result Value Ref Range    WBC 6.13 4.00 - 10.00 10*3/mm3    RBC 4.38 4.30 - 5.50 10*6/mm3    Hemoglobin 13.7 13.5 - 16.5 g/dL    Hematocrit 40.8 40.0 - 49.0 %    MCV 93.2 83.0 - 97.0 fL    MCH 31.3 27.0 - 33.0 pg    MCHC 33.6 32.0 - 35.0 g/dL    RDW 12.9 11.7 - 14.5 %    RDW-SD 44.4 37.0 - 49.0 fl    MPV 9.2 8.9 - 12.1 fL    Platelets 347 150 - 375 10*3/mm3    Neutrophil % 70.9 39.0 - 75.0 %    Lymphocyte % 17.1 (L) 20.0 - 49.0 %    Monocyte % 6.9 4.0 - 12.0 %    Eosinophil % 2.3 1.0 - 5.0 %    Basophil % 1.0 0.0 - 1.1 %    Immature Grans % 1.8 (H) 0.0 - 0.5 %    Neutrophils, Absolute 4.35 1.50 - 7.00 10*3/mm3    Lymphocytes, Absolute 1.05 1.00 - 3.50 10*3/mm3    Monocytes, Absolute 0.42 0.25 - 0.80 10*3/mm3    Eosinophils, Absolute 0.14 0.00 - 0.36 10*3/mm3    Basophils, Absolute 0.06 0.00 - 0.10 10*3/mm3    Immature Grans, Absolute 0.11 (H) 0.00 - 0.03 10*3/mm3    nRBC 0.0 0.0 - 0.0 /100 WBC       Peripheral smear reviewed by me: Normal peripheral blood smear without leukoerythroblastic changes.      Assessment/Plan   Assessment:   1. Iron deficiency anemia secondary to recurrent GI bleeding from AVMs   * Follows with Dr Prakash. Has had cauterization.   * Tolerates oral iron well however has decline of hemoglobin occasionally despite oral iron.  He is currently on ferrous gluconate 3 times a day with vitamin C.   * Hemoglobin today is improved however he may still have an iron deficiency this will add iron studies.  Of course when he has recurrent bleeding I suspect his hemoglobin will decline again and he will  likely need IV iron.  I've asked him to notify me when he has symptoms of recurrent GI bleeding that we bring him in for lab check and see if he will benefit from IV iron.  We will schedule him every 6 weeks for lab checks and I'll see him back in 6 months.  He will continue to follow Dr. Prakash as well.    Plan:     1. Iron studies today. If low despite oral iron, will arrange for IV Feraheme x 2 doses. Discussed risk of allergic reaction with IV iron today.   2. CBC/ferritin every 6 weeks. MD in 6 months. Call if he has symptoms of progressive anemia.     Follow-up in 6 months. I asked the patient to call for any questions, concerns, or new symptoms.

## 2018-02-07 ENCOUNTER — HOSPITAL ENCOUNTER (OUTPATIENT)
Dept: GENERAL RADIOLOGY | Facility: HOSPITAL | Age: 73
Discharge: HOME | End: 2018-02-07

## 2018-02-07 ENCOUNTER — ANESTHESIA EVENT (OUTPATIENT)
Dept: PAIN MEDICINE | Facility: HOSPITAL | Age: 73
End: 2018-02-07

## 2018-02-07 ENCOUNTER — ANESTHESIA (OUTPATIENT)
Dept: PAIN MEDICINE | Facility: HOSPITAL | Age: 73
End: 2018-02-07

## 2018-02-07 ENCOUNTER — HOSPITAL ENCOUNTER (OUTPATIENT)
Dept: PAIN MEDICINE | Facility: HOSPITAL | Age: 73
Discharge: HOME | End: 2018-02-07
Attending: ORTHOPAEDIC SURGERY | Admitting: ORTHOPAEDIC SURGERY

## 2018-02-07 VITALS
HEART RATE: 72 BPM | DIASTOLIC BLOOD PRESSURE: 77 MMHG | BODY MASS INDEX: 37.18 KG/M2 | WEIGHT: 251 LBS | TEMPERATURE: 98.7 F | HEIGHT: 69 IN | RESPIRATION RATE: 16 BRPM | SYSTOLIC BLOOD PRESSURE: 164 MMHG | OXYGEN SATURATION: 97 %

## 2018-02-07 DIAGNOSIS — R52 PAIN: ICD-10-CM

## 2018-02-07 DIAGNOSIS — M48.061 SPINAL STENOSIS OF LUMBAR REGION, UNSPECIFIED WHETHER NEUROGENIC CLAUDICATION PRESENT: ICD-10-CM

## 2018-02-07 PROCEDURE — C1755 CATHETER, INTRASPINAL: HCPCS

## 2018-02-07 PROCEDURE — 25010000002 METHYLPREDNISOLONE PER 80 MG: Performed by: ANESTHESIOLOGY

## 2018-02-07 PROCEDURE — 77003 FLUOROGUIDE FOR SPINE INJECT: CPT

## 2018-02-07 RX ORDER — FENTANYL CITRATE 50 UG/ML
50 INJECTION, SOLUTION INTRAMUSCULAR; INTRAVENOUS
Status: DISCONTINUED | OUTPATIENT
Start: 2018-02-07 | End: 2018-02-08 | Stop reason: HOSPADM

## 2018-02-07 RX ORDER — LIDOCAINE HYDROCHLORIDE 10 MG/ML
1 INJECTION, SOLUTION INFILTRATION; PERINEURAL ONCE
Status: DISCONTINUED | OUTPATIENT
Start: 2018-02-07 | End: 2018-02-08 | Stop reason: HOSPADM

## 2018-02-07 RX ORDER — SODIUM CHLORIDE 0.9 % (FLUSH) 0.9 %
1-10 SYRINGE (ML) INJECTION AS NEEDED
Status: DISCONTINUED | OUTPATIENT
Start: 2018-02-07 | End: 2018-02-08 | Stop reason: HOSPADM

## 2018-02-07 RX ORDER — METHYLPREDNISOLONE ACETATE 80 MG/ML
80 INJECTION, SUSPENSION INTRA-ARTICULAR; INTRALESIONAL; INTRAMUSCULAR; SOFT TISSUE ONCE
Status: COMPLETED | OUTPATIENT
Start: 2018-02-07 | End: 2018-02-07

## 2018-02-07 RX ORDER — MIDAZOLAM HYDROCHLORIDE 1 MG/ML
1 INJECTION INTRAMUSCULAR; INTRAVENOUS
Status: DISCONTINUED | OUTPATIENT
Start: 2018-02-07 | End: 2018-02-08 | Stop reason: HOSPADM

## 2018-02-07 RX ADMIN — METHYLPREDNISOLONE ACETATE 80 MG: 80 INJECTION, SUSPENSION INTRA-ARTICULAR; INTRALESIONAL; INTRAMUSCULAR; SOFT TISSUE at 08:34

## 2018-02-07 NOTE — ANESTHESIA PROCEDURE NOTES
PAIN Epidural block    Patient location during procedure: pain clinic  Start Time: 2/7/2018 8:26 AM  Stop Time: 2/7/2018 8:35 AM  Indication:at surgeon's request and procedure for pain  Performed By  Anesthesiologist: KRISTIN GARCIA  Preanesthetic Checklist  Completed: patient identified, site marked, surgical consent, pre-op evaluation, timeout performed, IV checked, risks and benefits discussed and monitors and equipment checked  Additional Notes  Dx:  Post-Op Diagnosis Codes:      * Lumbar spinal stenosis (M48.061)  80 mg depomedrol in epid    Plan : return to clinic as needed  Prep:  Pt Position:prone (prone)  Sterile Tech:cap, gloves, mask and sterile barrier  Prep:chlorhexidine gluconate and isopropyl alcohol  Monitoring:blood pressure monitoring, EKG and continuous pulse oximetry  Procedure:  Sedation: no   Approach:right paramedian  Guidance: fluoroscopy and c arm pa and lat and loss of resistance  Location:lumbar  Level:4-5 (interlaminar)  Needle Type:Smallknottead  Needle Gauge:20  Aspiration:negative  Medications:  Depomedrol:80 mg  Preservative Free Saline:3mL  Comments:No dye due to renal pathology  Post Assessment:  Post-procedure: bandaid.  Pt Tolerance:patient tolerated the procedure well with no apparent complications  Complications:no

## 2018-02-07 NOTE — H&P
INTERVAL HISTORY:    The patient returns for another Lumbar epidural steroid injection 3 today.  They have received 90 x 8 months, % improvement since their last injection with a pain level of 5-9 /10 at its worst recently.    Conservative measures tried in the interim. Daily activities affected by the pain.  MP - 2  Lungs - clear  CV - rrr    Diagnosis:Dx:  Post-Op Diagnosis Codes:      * Lumbar spinal stenosis [M48.061]    Plan:  Lumbar epidural steroid injection under fluoroscopic guidance    I have encouraged them to continue:  1.  Physical therapy exercises at home as prescribed by physical therapy or from the pain clinic handout (given to the patient).  Continuation of these exercises every day, or multiple times per week, even when the patient has good pain relief, was stressed to the patient as a preventative measure to decrease the frequency and severity of future pain episodes.  2.  Continue pain medicines as already prescribed.  If patient not currently taking any, it is recommended to begin Acetaminophen 1000 mg po q 8 hours.  If other medicines containing Acetaminophen are currently prescribed, maintain daily dose at 3000mg.    3.  If they can tolerate NSAIDS, it is recommended to take Ibuprofen 600 mg po q 6 hours for 7 days during pain exacerbations.   Alternatively, they may substitute an NSAID of their choice (e.g. Aleve)  4.  Heat and ice to the affected area as tolerated for pain control.  It was discussed that heating pads can cause burns.  5.  Low impact exercise such as walking or water exercise was recommended to maintain overall health and aid in weight control.   6.  Follow up as needed for subsequent injections.  7.  Patient was counseled to abstain from tobacco products.

## 2018-02-16 ENCOUNTER — TELEPHONE (OUTPATIENT)
Dept: ORTHOPEDIC SURGERY | Facility: CLINIC | Age: 73
End: 2018-02-16

## 2018-02-16 DIAGNOSIS — M48.061 SPINAL STENOSIS OF LUMBAR REGION, UNSPECIFIED WHETHER NEUROGENIC CLAUDICATION PRESENT: Primary | ICD-10-CM

## 2018-02-16 NOTE — TELEPHONE ENCOUNTER
PATIENT CALLED WANTING TO KNOW IF YOU WANT HIM TO START NEW SERIES OF LUMBAR EPIDURAL INJECTIONS OR WOULD YOU PREFER HIM TO COME IN AND SEE YOU/DM

## 2018-02-19 NOTE — TELEPHONE ENCOUNTER
ORDER IN [facility]. LM ON PT VM TO RETURN CALL:   PLEASE LET HIM KNOW [facility] WANTS PT TO REPEAT EPIDURALS AND ORDER HAS BEEN PUT IN EPIC.  0855 02/19/18 WKT

## 2018-03-14 ENCOUNTER — CLINICAL SUPPORT (OUTPATIENT)
Dept: ONCOLOGY | Facility: HOSPITAL | Age: 73
End: 2018-03-14

## 2018-03-14 ENCOUNTER — TELEPHONE (OUTPATIENT)
Dept: ONCOLOGY | Facility: HOSPITAL | Age: 73
End: 2018-03-14

## 2018-03-14 ENCOUNTER — TELEPHONE (OUTPATIENT)
Dept: ONCOLOGY | Facility: CLINIC | Age: 73
End: 2018-03-14

## 2018-03-14 ENCOUNTER — LAB (OUTPATIENT)
Dept: LAB | Facility: HOSPITAL | Age: 73
End: 2018-03-14

## 2018-03-14 DIAGNOSIS — D50.8 OTHER IRON DEFICIENCY ANEMIA: Primary | ICD-10-CM

## 2018-03-14 DIAGNOSIS — D50.8 OTHER IRON DEFICIENCY ANEMIA: ICD-10-CM

## 2018-03-14 PROBLEM — D50.9 IRON DEFICIENCY ANEMIA: Status: ACTIVE | Noted: 2018-03-14

## 2018-03-14 LAB
BASOPHILS # BLD AUTO: 0.06 10*3/MM3 (ref 0–0.1)
BASOPHILS NFR BLD AUTO: 1 % (ref 0–1.1)
DEPRECATED RDW RBC AUTO: 46.7 FL (ref 37–49)
EOSINOPHIL # BLD AUTO: 0.16 10*3/MM3 (ref 0–0.36)
EOSINOPHIL NFR BLD AUTO: 2.6 % (ref 1–5)
ERYTHROCYTE [DISTWIDTH] IN BLOOD BY AUTOMATED COUNT: 13.4 % (ref 11.7–14.5)
FERRITIN SERPL-MCNC: 21.2 NG/ML (ref 30–400)
HCT VFR BLD AUTO: 30.4 % (ref 40–49)
HGB BLD-MCNC: 9.7 G/DL (ref 13.5–16.5)
IMM GRANULOCYTES # BLD: 0.16 10*3/MM3 (ref 0–0.03)
IMM GRANULOCYTES NFR BLD: 2.6 % (ref 0–0.5)
IRON 24H UR-MRATE: 59 MCG/DL (ref 59–158)
IRON SATN MFR SERPL: 16 % (ref 14–48)
LYMPHOCYTES # BLD AUTO: 1.27 10*3/MM3 (ref 1–3.5)
LYMPHOCYTES NFR BLD AUTO: 20.7 % (ref 20–49)
MCH RBC QN AUTO: 30.3 PG (ref 27–33)
MCHC RBC AUTO-ENTMCNC: 31.9 G/DL (ref 32–35)
MCV RBC AUTO: 95 FL (ref 83–97)
MONOCYTES # BLD AUTO: 0.43 10*3/MM3 (ref 0.25–0.8)
MONOCYTES NFR BLD AUTO: 7 % (ref 4–12)
NEUTROPHILS # BLD AUTO: 4.07 10*3/MM3 (ref 1.5–7)
NEUTROPHILS NFR BLD AUTO: 66.1 % (ref 39–75)
NRBC BLD MANUAL-RTO: 0.3 /100 WBC (ref 0–0)
PLATELET # BLD AUTO: 483 10*3/MM3 (ref 150–375)
PMV BLD AUTO: 8.5 FL (ref 8.9–12.1)
RBC # BLD AUTO: 3.2 10*6/MM3 (ref 4.3–5.5)
TIBC SERPL-MCNC: 368 MCG/DL (ref 249–505)
TRANSFERRIN SERPL-MCNC: 263 MG/DL (ref 200–360)
WBC NRBC COR # BLD: 6.15 10*3/MM3 (ref 4–10)

## 2018-03-14 PROCEDURE — 85025 COMPLETE CBC W/AUTO DIFF WBC: CPT | Performed by: INTERNAL MEDICINE

## 2018-03-14 PROCEDURE — 84466 ASSAY OF TRANSFERRIN: CPT | Performed by: INTERNAL MEDICINE

## 2018-03-14 PROCEDURE — 82728 ASSAY OF FERRITIN: CPT | Performed by: INTERNAL MEDICINE

## 2018-03-14 PROCEDURE — 83540 ASSAY OF IRON: CPT | Performed by: INTERNAL MEDICINE

## 2018-03-14 PROCEDURE — 36415 COLL VENOUS BLD VENIPUNCTURE: CPT | Performed by: INTERNAL MEDICINE

## 2018-03-14 RX ORDER — SODIUM CHLORIDE 9 MG/ML
250 INJECTION, SOLUTION INTRAVENOUS ONCE
Status: CANCELLED | OUTPATIENT
Start: 2018-03-14

## 2018-03-14 RX ORDER — FAMOTIDINE 10 MG/ML
20 INJECTION, SOLUTION INTRAVENOUS ONCE
Status: CANCELLED | OUTPATIENT
Start: 2018-03-14

## 2018-03-14 RX ORDER — DIPHENHYDRAMINE HCL 25 MG
25 CAPSULE ORAL ONCE
Status: CANCELLED | OUTPATIENT
Start: 2018-03-14

## 2018-03-14 NOTE — PROGRESS NOTES
Please let patient know that his iron is low and I think he'd benefit from two doses of Feraheme. Orders are in. Thanks,  Perlita

## 2018-03-14 NOTE — TELEPHONE ENCOUNTER
Notified pt of this information and sent msg to scheduling.     ----- Message from Perlita Sandoval MD sent at 3/14/2018  1:25 PM EDT -----  Please let patient know that his iron is low and I think he'd benefit from two doses of Feraheme. Orders are in. Thanks,  Perlita

## 2018-03-14 NOTE — TELEPHONE ENCOUNTER
----- Message from Marj Mullins RN sent at 3/14/2018  2:03 PM EDT -----  Patient needs two doses of feraheme. Needs to start this week or next bc he needs both doses in by April 6th due to trip.

## 2018-03-14 NOTE — PROGRESS NOTES
CBC reviewed with pt. Hgb 9.7. Pt states he is SOA on exertion, has very weak legs and is fatigued. He is taking oral iron TID. Symptoms started approx 1 month ago. Pt denies bright red blood in stool but states he does have black, tarry stools. Reviewed with Unique Gallardo NP. Per Unique, give pt occult blood cards and have him contact GI MD Dr. Prakash. We will review ferritin and iron panel with Dr. Sandoval this afternoon and will be in touch with him about results. Pt v/u.     Labs reviewed with Dr. Sandoval. Per Dr. Sandoval, pt needs to be set up with 2 doses of feraheme. She already sent a message to triage. She also agrees pt needs to f/u with Dr. Prakash. Called pt and he was already aware of the IV iron. Advised him to call Dr. Prakash's office as well to make them aware. He v/u.

## 2018-03-19 ENCOUNTER — INFUSION (OUTPATIENT)
Dept: ONCOLOGY | Facility: HOSPITAL | Age: 73
End: 2018-03-19

## 2018-03-19 VITALS
SYSTOLIC BLOOD PRESSURE: 155 MMHG | TEMPERATURE: 98.9 F | WEIGHT: 250 LBS | DIASTOLIC BLOOD PRESSURE: 70 MMHG | HEART RATE: 106 BPM | BODY MASS INDEX: 36.92 KG/M2

## 2018-03-19 DIAGNOSIS — D50.0 IRON DEFICIENCY ANEMIA DUE TO CHRONIC BLOOD LOSS: ICD-10-CM

## 2018-03-19 DIAGNOSIS — D50.8 OTHER IRON DEFICIENCY ANEMIA: Primary | ICD-10-CM

## 2018-03-19 LAB
COLLECT DATE SP2 STL: NORMAL
COLLECT DATE SP3 STL: NORMAL
COLLECT DATE STL: NORMAL
HEMOCCULT STL QL: NEGATIVE
Lab: 800

## 2018-03-19 PROCEDURE — 96374 THER/PROPH/DIAG INJ IV PUSH: CPT

## 2018-03-19 PROCEDURE — 63710000001 DIPHENHYDRAMINE PER 50 MG: Performed by: INTERNAL MEDICINE

## 2018-03-19 PROCEDURE — 25010000002 FERUMOXYTOL 510 MG/17ML SOLUTION 510 MG VIAL: Performed by: INTERNAL MEDICINE

## 2018-03-19 PROCEDURE — 82270 OCCULT BLOOD FECES: CPT

## 2018-03-19 PROCEDURE — 96375 TX/PRO/DX INJ NEW DRUG ADDON: CPT

## 2018-03-19 RX ORDER — SODIUM CHLORIDE 9 MG/ML
250 INJECTION, SOLUTION INTRAVENOUS ONCE
Status: COMPLETED | OUTPATIENT
Start: 2018-03-19 | End: 2018-03-19

## 2018-03-19 RX ORDER — DIPHENHYDRAMINE HCL 25 MG
25 CAPSULE ORAL ONCE
Status: COMPLETED | OUTPATIENT
Start: 2018-03-19 | End: 2018-03-19

## 2018-03-19 RX ORDER — SODIUM CHLORIDE 9 MG/ML
250 INJECTION, SOLUTION INTRAVENOUS ONCE
Status: CANCELLED | OUTPATIENT
Start: 2018-03-19

## 2018-03-19 RX ORDER — FAMOTIDINE 10 MG/ML
20 INJECTION, SOLUTION INTRAVENOUS ONCE
Status: CANCELLED | OUTPATIENT
Start: 2018-03-19

## 2018-03-19 RX ORDER — FAMOTIDINE 10 MG/ML
20 INJECTION, SOLUTION INTRAVENOUS ONCE
Status: COMPLETED | OUTPATIENT
Start: 2018-03-19 | End: 2018-03-19

## 2018-03-19 RX ORDER — DIPHENHYDRAMINE HCL 25 MG
25 CAPSULE ORAL ONCE
Status: CANCELLED | OUTPATIENT
Start: 2018-03-19

## 2018-03-19 RX ADMIN — DIPHENHYDRAMINE HYDROCHLORIDE 25 MG: 25 CAPSULE ORAL at 14:16

## 2018-03-19 RX ADMIN — FAMOTIDINE 20 MG: 10 INJECTION, SOLUTION INTRAVENOUS at 14:17

## 2018-03-19 RX ADMIN — FERUMOXYTOL 510 MG: 510 INJECTION INTRAVENOUS at 14:49

## 2018-03-19 RX ADMIN — SODIUM CHLORIDE 250 ML: 900 INJECTION, SOLUTION INTRAVENOUS at 14:18

## 2018-03-21 ENCOUNTER — HOSPITAL ENCOUNTER (OUTPATIENT)
Dept: GENERAL RADIOLOGY | Facility: HOSPITAL | Age: 73
Discharge: HOME | End: 2018-03-21

## 2018-03-21 ENCOUNTER — ANESTHESIA EVENT (OUTPATIENT)
Dept: PAIN MEDICINE | Facility: HOSPITAL | Age: 73
End: 2018-03-21

## 2018-03-21 ENCOUNTER — ANESTHESIA (OUTPATIENT)
Dept: PAIN MEDICINE | Facility: HOSPITAL | Age: 73
End: 2018-03-21

## 2018-03-21 ENCOUNTER — HOSPITAL ENCOUNTER (OUTPATIENT)
Dept: PAIN MEDICINE | Facility: HOSPITAL | Age: 73
Discharge: HOME | End: 2018-03-21
Attending: ORTHOPAEDIC SURGERY | Admitting: ORTHOPAEDIC SURGERY

## 2018-03-21 VITALS
WEIGHT: 250.88 LBS | OXYGEN SATURATION: 96 % | TEMPERATURE: 97.9 F | HEIGHT: 69 IN | RESPIRATION RATE: 16 BRPM | SYSTOLIC BLOOD PRESSURE: 118 MMHG | HEART RATE: 74 BPM | BODY MASS INDEX: 37.16 KG/M2 | DIASTOLIC BLOOD PRESSURE: 61 MMHG

## 2018-03-21 DIAGNOSIS — R52 PAIN: ICD-10-CM

## 2018-03-21 DIAGNOSIS — M48.061 SPINAL STENOSIS OF LUMBAR REGION, UNSPECIFIED WHETHER NEUROGENIC CLAUDICATION PRESENT: ICD-10-CM

## 2018-03-21 PROCEDURE — C1755 CATHETER, INTRASPINAL: HCPCS

## 2018-03-21 PROCEDURE — 77003 FLUOROGUIDE FOR SPINE INJECT: CPT

## 2018-03-21 PROCEDURE — 25010000002 METHYLPREDNISOLONE PER 80 MG: Performed by: ANESTHESIOLOGY

## 2018-03-21 RX ORDER — METHYLPREDNISOLONE ACETATE 80 MG/ML
80 INJECTION, SUSPENSION INTRA-ARTICULAR; INTRALESIONAL; INTRAMUSCULAR; SOFT TISSUE ONCE
Status: COMPLETED | OUTPATIENT
Start: 2018-03-21 | End: 2018-03-21

## 2018-03-21 RX ORDER — FENTANYL CITRATE 50 UG/ML
50 INJECTION, SOLUTION INTRAMUSCULAR; INTRAVENOUS
Status: DISCONTINUED | OUTPATIENT
Start: 2018-03-21 | End: 2018-03-22 | Stop reason: HOSPADM

## 2018-03-21 RX ORDER — SODIUM CHLORIDE 0.9 % (FLUSH) 0.9 %
1-10 SYRINGE (ML) INJECTION AS NEEDED
Status: DISCONTINUED | OUTPATIENT
Start: 2018-03-21 | End: 2018-03-22 | Stop reason: HOSPADM

## 2018-03-21 RX ORDER — LIDOCAINE HYDROCHLORIDE 10 MG/ML
1 INJECTION, SOLUTION INFILTRATION; PERINEURAL ONCE
Status: DISCONTINUED | OUTPATIENT
Start: 2018-03-21 | End: 2018-03-22 | Stop reason: HOSPADM

## 2018-03-21 RX ORDER — MIDAZOLAM HYDROCHLORIDE 1 MG/ML
1 INJECTION INTRAMUSCULAR; INTRAVENOUS
Status: DISCONTINUED | OUTPATIENT
Start: 2018-03-21 | End: 2018-03-22 | Stop reason: HOSPADM

## 2018-03-21 RX ADMIN — METHYLPREDNISOLONE ACETATE 80 MG: 80 INJECTION, SUSPENSION INTRA-ARTICULAR; INTRALESIONAL; INTRAMUSCULAR; SOFT TISSUE at 10:18

## 2018-03-21 NOTE — ANESTHESIA PROCEDURE NOTES
PAIN Epidural block    Patient location during procedure: pain clinic  Indication:procedure for pain  Performed By  Anesthesiologist: LUIS EDUARDO WEBSTER  Preanesthetic Checklist  Completed: patient identified, site marked, surgical consent, pre-op evaluation, timeout performed, risks and benefits discussed and monitors and equipment checked  Additional Notes  Depomedrol - 80mg    Needle position confirmed by fluoroscopy. No contrast due to renal function.    Diagnosis  Post-Op Diagnosis Codes:     * Lumbar degenerative disc disease (M51.36)     * Lumbar radiculopathy (M54.16)    Prep:  Pt Position:prone  Sterile Tech:cap, gloves, mask and sterile barrier  Prep:chlorhexidine gluconate and isopropyl alcohol  Monitoring:blood pressure monitoring, continuous pulse oximetry and EKG  Procedure:  Sedation: no   Approach:right paramedian  Guidance: fluoroscopy  Location:lumbar  Level:4-5  Needle Type:Tuohy  Needle Gauge:20  Aspiration:negative  Medications:  Depomedrol:80 mg  Preservative Free Saline:2mL  Isovue:0mL    Post Assessment:  Post-procedure: bandaide.  Pt Tolerance:patient tolerated the procedure well with no apparent complications  Complications:no

## 2018-03-21 NOTE — H&P
[facility]    History and Physical    Patient Name: Danyel Dash  :  1945  MRN:  [id number]  Date of Admission: 3/21/2018    Subjective     Patient is a 72 y.o. male presents with chief complaint of chronic, intermitent, moderate, severe low back and hips: bilateral pain.  Onset of symptoms was gradual starting several months ago.  Symptoms are associated/aggravated by lifting, standing, straining or twisting. Symptoms improve with relaxation    The following portions of the patients history were reviewed and updated as appropriate: current medications, allergies, past medical history, past surgical history, past family history, past social history and problem list                Objective     Past Medical History:   Past Medical History:   Diagnosis Date   • Anemia    • Arthritis    • Chronic kidney disease    • Coronary artery disease     rheumatic fever at age 7   • Diabetes mellitus    • Hypertension    • Low back pain    • Rheumatic fever      Past Surgical History:   Past Surgical History:   Procedure Laterality Date   • COLONOSCOPY N/A 2016    tics, IH, polyps   • ENDOSCOPY N/A 2016    LA Grade B reflux esophagitis, erythematous mucosa in stomach   • ENDOSCOPY N/A 2017    Two jejunal AVM's    • ENTEROSCOPY SMALL BOWEL  2017    Procedure: ENTEROSCOPY SMALL BOWEL;  Surgeon: Anil Prakash MD;  Location: [facility] ENDOSCOPY;  Service:    • EPIDURAL BLOCK     • KNEE ARTHROSCOPY Right    • SHOULDER ARTHROSCOPY Right    • TONSILLECTOMY       Family History:   Family History   Problem Relation Age of Onset   • Diverticulosis Father    • Alzheimer's disease Mother      Social History:   Social History   Substance Use Topics   • Smoking status: Former Smoker     Packs/day: 1.50     Years: 18.00     Types: Cigarettes   • Smokeless tobacco: Never Used      Comment: quit    • Alcohol use No       Vital Signs Range for the last 24 hours  Temperature: Temp:  [36.6 °C (97.9 °F)] 36.6 °C  "(97.9 °F)   Temp Source: Temp src: Oral   BP: BP: (148)/(74) 148/74   Pulse: Heart Rate:  [82] 82   Respirations: Resp:  [16] 16   SPO2: SpO2:  [93 %] 93 %   O2 Amount (l/min):     O2 Devices Device (Oxygen Therapy): room air   Weight: Weight:  [114 kg (250 lb 14.1 oz)] 114 kg (250 lb 14.1 oz)     Flowsheet Rows         First Filed Value    Admission Height  69\" (175.3 cm) Documented at 03/23/2017 1000    Admission Weight            --------------------------------------------------------------------------------    Current Outpatient Prescriptions   Medication Sig Dispense Refill   • amLODIPine (NORVASC) 10 MG tablet Take 1 tablet by mouth Daily. 90 tablet 0   • buPROPion XL (WELLBUTRIN XL) 300 MG 24 hr tablet Take 1 tablet by mouth Daily. 90 tablet 3   • Cholecalciferol (VITAMIN D-3) 1000 UNITS capsule Take 1,000 Units by mouth Daily.     • Iron, Ferrous Gluconate, 256 (28 FE) MG tablet Take 1 tablet by mouth daily.     • lisinopril (PRINIVIL,ZESTRIL) 5 MG tablet Take 5 mg by mouth Daily.     • omeprazole (priLOSEC) 40 MG capsule Take 1 capsule by mouth Daily. 90 capsule 3   • pioglitazone (ACTOS) 45 MG tablet Take 1 tablet by mouth Daily. 90 tablet 3   • potassium chloride (KLOR-CON) 8 MEQ CR tablet Take 8 mEq by mouth 2 (Two) Times a Day.     • predniSONE (DELTASONE) 20 MG tablet 2 po qd for 5 days 10 tablet 0   • tadalafil (CIALIS) 20 MG tablet Take 1 tablet by mouth Daily As Needed for erectile dysfunction. 9 tablet 3   • vitamin B-12 (CYANOCOBALAMIN) 1000 MCG tablet Take 1,000 mcg by mouth Daily.     • aspirin 81 MG EC tablet Take 81 mg by mouth Daily.     • Blood Gluc Meter Disp-Strips device Pt to monitor blood glucose two times daily 1 each 0     No current facility-administered medications for this encounter.        --------------------------------------------------------------------------------  Assessment/Plan      Anesthesia Evaluation     Patient summary reviewed and Nursing notes reviewed   no history " of anesthetic complications:  NPO Solid Status: > 6 hours  NPO Liquid Status: > 2 hours    Pain impairs ability to perform ADLs: Sleeping  Modalities previously tried to control pain with limited effectiveness: Rest     Airway   Mallampati: II  TM distance: >3 FB  Neck ROM: full  Dental - normal exam     Pulmonary - negative pulmonary ROS and normal exam   Cardiovascular - normal exam    (+) hypertension, CAD,       Neuro/Psych- neuro exam normal  GI/Hepatic/Renal/Endo    (+)  GERD,  diabetes mellitus,     Musculoskeletal     (+) back pain, radiculopathy Left lower extremity and Right lower extremity  Abdominal  - normal exam   Substance History      OB/GYN          Other   (+) arthritis                Diagnosis and Plan      Treatment Plan  ASA 2   Patient has had previous injection/procedure with 25-50% improvement.   Procedures: Lumbar Epidural Steroid Injection(LESI), With fluoroscopy,       Anesthetic plan and risks discussed with patient.          Diagnosis     * Lumbar degenerative disc disease [M51.36]     * Lumbar radiculopathy [M54.16]

## 2018-03-26 ENCOUNTER — INFUSION (OUTPATIENT)
Dept: ONCOLOGY | Facility: HOSPITAL | Age: 73
End: 2018-03-26

## 2018-03-26 VITALS
HEART RATE: 85 BPM | SYSTOLIC BLOOD PRESSURE: 132 MMHG | WEIGHT: 249.2 LBS | DIASTOLIC BLOOD PRESSURE: 74 MMHG | TEMPERATURE: 98.7 F | BODY MASS INDEX: 36.78 KG/M2

## 2018-03-26 DIAGNOSIS — D50.0 IRON DEFICIENCY ANEMIA DUE TO CHRONIC BLOOD LOSS: Primary | ICD-10-CM

## 2018-03-26 PROCEDURE — 25010000002 FERUMOXYTOL 510 MG/17ML SOLUTION 510 MG VIAL: Performed by: INTERNAL MEDICINE

## 2018-03-26 PROCEDURE — 96375 TX/PRO/DX INJ NEW DRUG ADDON: CPT

## 2018-03-26 PROCEDURE — 63710000001 DIPHENHYDRAMINE PER 50 MG: Performed by: INTERNAL MEDICINE

## 2018-03-26 PROCEDURE — 96374 THER/PROPH/DIAG INJ IV PUSH: CPT

## 2018-03-26 RX ORDER — FAMOTIDINE 10 MG/ML
20 INJECTION, SOLUTION INTRAVENOUS ONCE
Status: COMPLETED | OUTPATIENT
Start: 2018-03-26 | End: 2018-03-26

## 2018-03-26 RX ORDER — SODIUM CHLORIDE 9 MG/ML
250 INJECTION, SOLUTION INTRAVENOUS ONCE
Status: COMPLETED | OUTPATIENT
Start: 2018-03-26 | End: 2018-03-26

## 2018-03-26 RX ORDER — SODIUM CHLORIDE 9 MG/ML
250 INJECTION, SOLUTION INTRAVENOUS ONCE
Status: CANCELLED | OUTPATIENT
Start: 2018-03-26

## 2018-03-26 RX ORDER — FAMOTIDINE 10 MG/ML
20 INJECTION, SOLUTION INTRAVENOUS ONCE
Status: CANCELLED | OUTPATIENT
Start: 2018-03-26

## 2018-03-26 RX ORDER — DIPHENHYDRAMINE HCL 25 MG
25 CAPSULE ORAL ONCE
Status: COMPLETED | OUTPATIENT
Start: 2018-03-26 | End: 2018-03-26

## 2018-03-26 RX ORDER — DIPHENHYDRAMINE HCL 25 MG
25 CAPSULE ORAL ONCE
Status: CANCELLED | OUTPATIENT
Start: 2018-03-26

## 2018-03-26 RX ADMIN — DIPHENHYDRAMINE HYDROCHLORIDE 25 MG: 25 CAPSULE ORAL at 14:11

## 2018-03-26 RX ADMIN — SODIUM CHLORIDE 250 ML: 9 INJECTION, SOLUTION INTRAVENOUS at 14:11

## 2018-03-26 RX ADMIN — FAMOTIDINE 20 MG: 10 INJECTION, SOLUTION INTRAVENOUS at 14:11

## 2018-03-26 RX ADMIN — FERUMOXYTOL 510 MG: 510 INJECTION INTRAVENOUS at 14:34

## 2018-04-25 ENCOUNTER — CLINICAL SUPPORT (OUTPATIENT)
Dept: ONCOLOGY | Facility: HOSPITAL | Age: 73
End: 2018-04-25

## 2018-04-25 ENCOUNTER — LAB (OUTPATIENT)
Dept: LAB | Facility: HOSPITAL | Age: 73
End: 2018-04-25

## 2018-04-25 DIAGNOSIS — D50.8 OTHER IRON DEFICIENCY ANEMIA: ICD-10-CM

## 2018-04-25 LAB
BASOPHILS # BLD AUTO: 0.04 10*3/MM3 (ref 0–0.1)
BASOPHILS NFR BLD AUTO: 0.8 % (ref 0–1.1)
DEPRECATED RDW RBC AUTO: 46 FL (ref 37–49)
EOSINOPHIL # BLD AUTO: 0.12 10*3/MM3 (ref 0–0.36)
EOSINOPHIL NFR BLD AUTO: 2.3 % (ref 1–5)
ERYTHROCYTE [DISTWIDTH] IN BLOOD BY AUTOMATED COUNT: 13.5 % (ref 11.7–14.5)
FERRITIN SERPL-MCNC: 66.9 NG/ML (ref 30–400)
HCT VFR BLD AUTO: 41.7 % (ref 40–49)
HGB BLD-MCNC: 13.8 G/DL (ref 13.5–16.5)
IMM GRANULOCYTES # BLD: 0.05 10*3/MM3 (ref 0–0.03)
IMM GRANULOCYTES NFR BLD: 1 % (ref 0–0.5)
LYMPHOCYTES # BLD AUTO: 1.1 10*3/MM3 (ref 1–3.5)
LYMPHOCYTES NFR BLD AUTO: 21.4 % (ref 20–49)
MCH RBC QN AUTO: 30.9 PG (ref 27–33)
MCHC RBC AUTO-ENTMCNC: 33.1 G/DL (ref 32–35)
MCV RBC AUTO: 93.3 FL (ref 83–97)
MONOCYTES # BLD AUTO: 0.45 10*3/MM3 (ref 0.25–0.8)
MONOCYTES NFR BLD AUTO: 8.7 % (ref 4–12)
NEUTROPHILS # BLD AUTO: 3.39 10*3/MM3 (ref 1.5–7)
NEUTROPHILS NFR BLD AUTO: 65.8 % (ref 39–75)
NRBC BLD MANUAL-RTO: 0 /100 WBC (ref 0–0)
PLATELET # BLD AUTO: 399 10*3/MM3 (ref 150–375)
PMV BLD AUTO: 8.9 FL (ref 8.9–12.1)
RBC # BLD AUTO: 4.47 10*6/MM3 (ref 4.3–5.5)
WBC NRBC COR # BLD: 5.15 10*3/MM3 (ref 4–10)

## 2018-04-25 PROCEDURE — 82728 ASSAY OF FERRITIN: CPT | Performed by: INTERNAL MEDICINE

## 2018-04-25 PROCEDURE — 36415 COLL VENOUS BLD VENIPUNCTURE: CPT | Performed by: INTERNAL MEDICINE

## 2018-04-25 PROCEDURE — 85025 COMPLETE CBC W/AUTO DIFF WBC: CPT | Performed by: INTERNAL MEDICINE

## 2018-04-25 NOTE — PROGRESS NOTES
CBC reviewed with patient. Counts are stable. pts hgb has increased to 13.8 and patient states he feels good. Will call patient later today with Ferritin results. Pt v/u

## 2018-05-14 ENCOUNTER — OFFICE VISIT (OUTPATIENT)
Dept: GASTROENTEROLOGY | Facility: CLINIC | Age: 73
End: 2018-05-14

## 2018-05-14 VITALS
BODY MASS INDEX: 36.88 KG/M2 | WEIGHT: 249 LBS | HEIGHT: 69 IN | DIASTOLIC BLOOD PRESSURE: 80 MMHG | TEMPERATURE: 97.8 F | SYSTOLIC BLOOD PRESSURE: 134 MMHG

## 2018-05-14 DIAGNOSIS — R19.5 OCCULT GI BLEEDING: ICD-10-CM

## 2018-05-14 DIAGNOSIS — D50.0 IRON DEFICIENCY ANEMIA DUE TO CHRONIC BLOOD LOSS: Primary | ICD-10-CM

## 2018-05-14 DIAGNOSIS — K21.9 GASTROESOPHAGEAL REFLUX DISEASE, ESOPHAGITIS PRESENCE NOT SPECIFIED: ICD-10-CM

## 2018-05-14 PROCEDURE — 99214 OFFICE O/P EST MOD 30 MIN: CPT | Performed by: INTERNAL MEDICINE

## 2018-05-14 RX ORDER — ASCORBIC ACID 500 MG
500 TABLET ORAL DAILY
COMMUNITY

## 2018-05-14 NOTE — PROGRESS NOTES
Chief Complaint   Patient presents with   • Anemia       History of Present Illness: 73 yo male with GI bleeding of obscure etiology. 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 He had an EGD with small bowel enteroscopy 7/17 where I burned some prox small bowel AVM's. He has iron def anemia. His most recent CBC (4/25/18) showed a Hgb of 13.8.  He has gotten 2 doses of IV iron from Dr. Sandoval with CBC. He feels great. He notes  Intermittent melena. NO rectal bleeding. NO diarrhea or constiaption. NO abdominal or chest pain. No nausea or vomiting. He is on one baby aspirin/day, no other NSAIDs or blood thinners. Weight stable. He is taking iron gluconate TID with vitamin C.     Past Medical History:   Diagnosis Date   • Anemia    • Arthritis    • Chronic kidney disease    • Coronary artery disease     rheumatic fever at age 7   • Diabetes mellitus    • GI bleed    • Hypertension    • Low back pain    • Rheumatic fever        Past Surgical History:   Procedure Laterality Date   • COLONOSCOPY N/A 12/27/2016    tics, IH, polyps   • ENDOSCOPY N/A 12/27/2016    LA Grade B reflux esophagitis, erythematous mucosa in stomach   • ENDOSCOPY N/A 7/7/2017    Two jejunal AVM's    • ENTEROSCOPY SMALL BOWEL  7/7/2017    Procedure: ENTEROSCOPY SMALL BOWEL;  Surgeon: Anil Prakash MD;  Location: [facility] ENDOSCOPY;  Service:    • EPIDURAL BLOCK     • KNEE ARTHROSCOPY Right    • SHOULDER ARTHROSCOPY Right    • TONSILLECTOMY           Current Outpatient Prescriptions:   •  amLODIPine (NORVASC) 10 MG tablet, Take 1 tablet by mouth Daily., Disp: 90 tablet, Rfl: 0  •  aspirin 81 MG EC tablet, Take 81 mg by mouth Daily., Disp: , Rfl:   •  Blood Gluc Meter Disp-Strips device, Pt to monitor blood glucose two times daily, Disp: 1 each, Rfl: 0  •  buPROPion XL (WELLBUTRIN XL) 300 MG 24 hr tablet, Take 1 tablet by mouth Daily., Disp: 90 tablet, Rfl: 3  •  Cholecalciferol (VITAMIN D-3) 1000 UNITS capsule,  Take 1,000 Units by mouth Daily., Disp: , Rfl:   •  fluticasone (FLONASE) 50 MCG/ACT nasal spray, 2 sprays into each nostril Daily., Disp: 15.8 mL, Rfl: 0  •  Iron, Ferrous Gluconate, 256 (28 FE) MG tablet, Take 1 tablet by mouth daily., Disp: , Rfl:   •  lisinopril (PRINIVIL,ZESTRIL) 5 MG tablet, Take 5 mg by mouth Daily., Disp: , Rfl:   •  omeprazole (priLOSEC) 40 MG capsule, Take 1 capsule by mouth Daily., Disp: 90 capsule, Rfl: 3  •  pioglitazone (ACTOS) 45 MG tablet, Take 1 tablet by mouth Daily., Disp: 90 tablet, Rfl: 3  •  potassium chloride (KLOR-CON) 8 MEQ CR tablet, Take 8 mEq by mouth 2 (Two) Times a Day., Disp: , Rfl:   •  tadalafil (CIALIS) 20 MG tablet, Take 1 tablet by mouth Daily As Needed for erectile dysfunction., Disp: 9 tablet, Rfl: 3  •  vitamin B-12 (CYANOCOBALAMIN) 1000 MCG tablet, Take 1,000 mcg by mouth Daily., Disp: , Rfl:   •  vitamin C (ASCORBIC ACID) 500 MG tablet, Take 500 mg by mouth Daily., Disp: , Rfl:   •  cefdinir (OMNICEF) 300 MG capsule, Take 1 capsule by mouth 2 (Two) Times a Day., Disp: 20 capsule, Rfl: 0  •  promethazine-dextromethorphan (PROMETHAZINE-DM) 6.25-15 MG/5ML syrup, Take 5 mL by mouth 3 (Three) Times a Day As Needed for Cough., Disp: 120 mL, Rfl: 0    No Known Allergies    Family History   Problem Relation Age of Onset   • Diverticulosis Father    • Diverticulitis Father    • Alzheimer's disease Mother        Social History     Social History   • Marital status:      Spouse name: N/A   • Number of children: N/A   • Years of education: N/A     Occupational History   •  Retired     Social History Main Topics   • Smoking status: Former Smoker     Packs/day: 1.50     Years: 18.00     Types: Cigarettes     Quit date: 2000   • Smokeless tobacco: Never Used      Comment: quit 1997   • Alcohol use No   • Drug use: No   • Sexual activity: Defer     Other Topics Concern   • Not on file     Social History Narrative   • No narrative on file       Review of  Systems   All other systems reviewed and are negative.      Vitals:    05/14/18 1014   BP: 134/80   Temp: 97.8 °F (36.6 °C)       Physical Exam   Constitutional: He is oriented to person, place, and time. He appears well-developed and well-nourished. No distress.   HENT:   Head: Normocephalic and atraumatic. Hair is normal.   Right Ear: Hearing, tympanic membrane, external ear and ear canal normal.   Left Ear: Hearing, tympanic membrane, external ear and ear canal normal.   Nose: No sinus tenderness or nasal deformity.   Mouth/Throat: Uvula is midline, oropharynx is clear and moist and mucous membranes are normal. No oral lesions. No uvula swelling.   Eyes: Conjunctivae, EOM and lids are normal. Pupils are equal, round, and reactive to light. Right eye exhibits no discharge. Left eye exhibits no discharge. No scleral icterus. Right eye exhibits normal extraocular motion and no nystagmus. Left eye exhibits normal extraocular motion and no nystagmus.   Fundoscopic exam:       The right eye shows red reflex.        The left eye shows red reflex.   Neck: Normal range of motion. Neck supple. No JVD present. No tracheal deviation present. No thyromegaly present.   Cardiovascular: Normal rate, regular rhythm, normal heart sounds, intact distal pulses and normal pulses.  Exam reveals no gallop.    No murmur heard.  Pulmonary/Chest: Effort normal and breath sounds normal. No respiratory distress. He has no wheezes. He has no rales. He exhibits no tenderness.   Abdominal: Soft. Bowel sounds are normal. He exhibits no distension and no mass. There is no tenderness. There is no guarding. No hernia.   Genitourinary: Rectum normal and prostate normal. Rectal exam shows guaiac negative stool.   Musculoskeletal: Normal range of motion. He exhibits no edema, tenderness or deformity.   Lymphadenopathy:     He has no cervical adenopathy.   Neurological: He is alert and oriented to person, place, and time. He has normal reflexes. He  displays normal reflexes. No cranial nerve deficit. He exhibits normal muscle tone. Coordination normal.   Skin: Skin is warm and dry. No rash noted. He is not diaphoretic.   Psychiatric: He has a normal mood and affect. His behavior is normal. Judgment and thought content normal.   Nursing note and vitals reviewed.      Danyel was seen today for anemia.    Diagnoses and all orders for this visit:    Iron deficiency anemia due to chronic blood loss    Occult GI bleeding    Gastroesophageal reflux disease, esophagitis presence not specified      Assessment:  1) GI bleeding of obscure etiology. 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 He had an EGD with small bowel enteroscopy 7/17 where I burned some prox small bowel AVM's.   2) Iron deficiency anemia. He is heme negative today.  3) GERD    Recommendations:  1) We discussed possibly having dr. Nael Antonio doing an EGD with [facility] to look for more AVM's and to burn then. Since he is heme negative today and doing well he prefers to not do any invasive testing now and to watch.  2) Continue taking iron pills and vitamin C and f/u with Dr. Sandoval.  3) f/u 4 mos    Return in about 4 months (around 9/14/2018).    Anil Prakash MD  5/14/2018

## 2018-05-29 ENCOUNTER — INFUSION (OUTPATIENT)
Dept: ONCOLOGY | Facility: HOSPITAL | Age: 73
End: 2018-05-29

## 2018-05-29 ENCOUNTER — LAB (OUTPATIENT)
Dept: LAB | Facility: HOSPITAL | Age: 73
End: 2018-05-29

## 2018-05-29 DIAGNOSIS — D50.8 OTHER IRON DEFICIENCY ANEMIA: ICD-10-CM

## 2018-05-29 LAB
BASOPHILS # BLD AUTO: 0.04 10*3/MM3 (ref 0–0.1)
BASOPHILS NFR BLD AUTO: 0.6 % (ref 0–1.1)
DEPRECATED RDW RBC AUTO: 43.4 FL (ref 37–49)
EOSINOPHIL # BLD AUTO: 0.11 10*3/MM3 (ref 0–0.36)
EOSINOPHIL NFR BLD AUTO: 1.8 % (ref 1–5)
ERYTHROCYTE [DISTWIDTH] IN BLOOD BY AUTOMATED COUNT: 12.9 % (ref 11.7–14.5)
FERRITIN SERPL-MCNC: 49.2 NG/ML (ref 30–400)
HCT VFR BLD AUTO: 42.9 % (ref 40–49)
HGB BLD-MCNC: 14.1 G/DL (ref 13.5–16.5)
IMM GRANULOCYTES # BLD: 0.05 10*3/MM3 (ref 0–0.03)
IMM GRANULOCYTES NFR BLD: 0.8 % (ref 0–0.5)
LYMPHOCYTES # BLD AUTO: 1.09 10*3/MM3 (ref 1–3.5)
LYMPHOCYTES NFR BLD AUTO: 17.5 % (ref 20–49)
MCH RBC QN AUTO: 30.3 PG (ref 27–33)
MCHC RBC AUTO-ENTMCNC: 32.9 G/DL (ref 32–35)
MCV RBC AUTO: 92.1 FL (ref 83–97)
MONOCYTES # BLD AUTO: 0.54 10*3/MM3 (ref 0.25–0.8)
MONOCYTES NFR BLD AUTO: 8.7 % (ref 4–12)
NEUTROPHILS # BLD AUTO: 4.39 10*3/MM3 (ref 1.5–7)
NEUTROPHILS NFR BLD AUTO: 70.6 % (ref 39–75)
NRBC BLD MANUAL-RTO: 0 /100 WBC (ref 0–0)
PLATELET # BLD AUTO: 298 10*3/MM3 (ref 150–375)
PMV BLD AUTO: 8.9 FL (ref 8.9–12.1)
RBC # BLD AUTO: 4.66 10*6/MM3 (ref 4.3–5.5)
WBC NRBC COR # BLD: 6.22 10*3/MM3 (ref 4–10)

## 2018-05-29 PROCEDURE — 85025 COMPLETE CBC W/AUTO DIFF WBC: CPT | Performed by: INTERNAL MEDICINE

## 2018-05-29 PROCEDURE — 36415 COLL VENOUS BLD VENIPUNCTURE: CPT | Performed by: INTERNAL MEDICINE

## 2018-05-29 PROCEDURE — 82728 ASSAY OF FERRITIN: CPT | Performed by: INTERNAL MEDICINE

## 2018-05-29 NOTE — PROGRESS NOTES
Pt here for CBC and Ferritin check. Reviewed CBC with patient. HGB improved to 14.1. Pt has no complaints. He will call back this afternoon for Ferritin results.     Lab Results   Component Value Date    WBC 6.22 05/29/2018    HGB 14.1 05/29/2018    HCT 42.9 05/29/2018    MCV 92.1 05/29/2018     05/29/2018

## 2018-07-19 NOTE — PROGRESS NOTES
History:     Reason for follow up:   1. 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 He follows with Dr Prakash as well. Since our last visit, he had progressive fatigue despite the oral iron and was found to be progressively anemic thus we arranged for 2 doses of Feraheme which she received in March 2018 with a nice response in his hemoglobin. He's feeling well today and fatigue is improved. Has dark stools but attributes this to his iron that he's taking three times per day without GI troubles.   He was in urgent care earlier this month (note reviewed) with complaints of sinus congestion and cough.  He was treated for acute axillary sinusitis.         I have reviewed, verified and personally updated the past medical, surgical, family and social history.      Past Medical   Past Medical History:   Diagnosis Date   • Anemia    • Arthritis    • Chronic kidney disease    • Coronary artery disease     rheumatic fever at age 7   • Diabetes mellitus (CMS/HCC)    • GI bleed    • Hypertension    • Low back pain    • Rheumatic fever     and   Patient Active Problem List   Diagnosis   • Anemia   • Anxiety   • Chronic kidney disease, stage III (moderate)   • Diabetes mellitus (CMS/HCC)   • Erectile dysfunction of nonorganic origin   • Gastroesophageal reflux disease   • Hyperlipidemia   • Essential hypertension   • Prostate disorder   • Iron deficiency anemia     Social History   Social History     Social History   • Marital status:      Spouse name: Lea   • Number of children: 3   • Years of education: N/A     Occupational History   •  Retired     Social History Main Topics   • Smoking status: Former Smoker     Packs/day: 1.50     Years: 18.00     Types: Cigarettes     Quit date: 2000   • Smokeless tobacco: Never Used      Comment: quit 1997   • Alcohol use No   • Drug use: No  "  • Sexual activity: Defer     Other Topics Concern   • Not on file     Social History Narrative   • No narrative on file     Family History  Family History   Problem Relation Age of Onset   • Diverticulosis Father    • Diverticulitis Father    • Alzheimer's disease Mother    • No Known Problems Sister      Allergies  No Known Allergies    Medications: The current medication list was reviewed in the EMR.    Review of Systems  Review of Systems   Constitutional: Negative for activity change, appetite change, chills, diaphoresis, fatigue, fever and unexpected weight change.   HENT: Negative for congestion, hearing loss, nosebleeds, sinus pressure and trouble swallowing.    Respiratory: Negative for cough, chest tightness, shortness of breath and wheezing.    Cardiovascular: Negative for chest pain, palpitations and leg swelling.   Gastrointestinal: Positive for blood in stool. Negative for abdominal pain, constipation, diarrhea, nausea and vomiting.   Musculoskeletal: Positive for arthralgias and back pain. Negative for neck pain.   Hematological: Negative for adenopathy. Does not bruise/bleed easily.       Objective    Objective:     Vitals:    07/23/18 1237   BP: 124/66   Pulse: 81   Resp: 18   Temp: 98.8 °F (37.1 °C)   TempSrc: Oral   SpO2: 95%   Weight: 108 kg (238 lb 12.8 oz)   Height: 175.3 cm (69.02\")   PainSc: 0-No pain  Comment: Bronchitis     Current Status 7/23/2018   ECOG score 0     GENERAL:  Well-developed, well-nourished male in no acute distress. Vital signs reviewed.   SKIN:  Warm, dry without rashes, purpura or petechiae.  EYES:  Pupils equal, round and reactive to light.  EOMs intact.  Conjunctivae normal.  HEAD:  Normocephalic.  EARS/NOSE/MOUTH/THROAT:  Hearing intact. Septum midline.  No excoriations or nasal discharge. No stomatitis or ulcers.  Lips normal. Oropharynx without lesions or exudates.  NECK:  Supple with good range of motion; no thyromegaly or masses  RESP:  Lungs clear to percussion " and auscultation. Good airflow. Normal respiratory effort.   CARDIAC:  Regular rate and rhythm without murmurs, rubs or gallops. Normal S1,S2. No edema  GI:  Soft, nontender, normal bowel sounds  MSK:  No clubbing or cyanosis, No joint swelling noted in hands  PSYCHIATRIC:  Normal affect and mood.  Alert and Oriented x 3.      Labs and Imaging  Results for orders placed or performed in visit on 07/23/18   CBC Auto Differential   Result Value Ref Range    WBC 6.28 4.00 - 10.00 10*3/mm3    RBC 5.22 4.30 - 5.50 10*6/mm3    Hemoglobin 15.5 13.5 - 16.5 g/dL    Hematocrit 46.7 40.0 - 49.0 %    MCV 89.5 83.0 - 97.0 fL    MCH 29.7 27.0 - 33.0 pg    MCHC 33.2 32.0 - 35.0 g/dL    RDW 13.2 11.7 - 14.5 %    RDW-SD 43.1 37.0 - 49.0 fl    MPV 8.7 (L) 8.9 - 12.1 fL    Platelets 333 150 - 375 10*3/mm3    Neutrophil % 73.9 39.0 - 75.0 %    Lymphocyte % 15.4 (L) 20.0 - 49.0 %    Monocyte % 7.0 4.0 - 12.0 %    Eosinophil % 2.2 1.0 - 5.0 %    Basophil % 1.0 0.0 - 1.1 %    Immature Grans % 0.5 0.0 - 0.5 %    Neutrophils, Absolute 4.64 1.50 - 7.00 10*3/mm3    Lymphocytes, Absolute 0.97 (L) 1.00 - 3.50 10*3/mm3    Monocytes, Absolute 0.44 0.25 - 0.80 10*3/mm3    Eosinophils, Absolute 0.14 0.00 - 0.36 10*3/mm3    Basophils, Absolute 0.06 0.00 - 0.10 10*3/mm3    Immature Grans, Absolute 0.03 0.00 - 0.03 10*3/mm3    nRBC 0.0 0.0 - 0.0 /100 WBC       Assessment/Plan   Assessment/Plan:   1. Iron deficiency anemia secondary to recurrent GI bleeding from AVMs              * Follows with Dr Prakash. Has had cauterization.              * Tolerates oral iron but he's refractory recently   * status post two doses Feraheme 3/2018              * Hemoglobin improved. Follow up iron studies   * Decrease oral iron to bid.    * Repeat labs in 6 months, but if fatigue worsens again, he knows to call and we can arrange for him to come in for cbc, iron studies.     Follow-up in 6 months. I asked the patient to call for any questions, concerns, or new  symptoms.

## 2018-07-23 ENCOUNTER — LAB (OUTPATIENT)
Dept: LAB | Facility: HOSPITAL | Age: 73
End: 2018-07-23

## 2018-07-23 ENCOUNTER — OFFICE VISIT (OUTPATIENT)
Dept: ONCOLOGY | Facility: CLINIC | Age: 73
End: 2018-07-23

## 2018-07-23 VITALS
TEMPERATURE: 98.8 F | OXYGEN SATURATION: 95 % | SYSTOLIC BLOOD PRESSURE: 124 MMHG | RESPIRATION RATE: 18 BRPM | HEIGHT: 69 IN | BODY MASS INDEX: 35.37 KG/M2 | WEIGHT: 238.8 LBS | DIASTOLIC BLOOD PRESSURE: 66 MMHG | HEART RATE: 81 BPM

## 2018-07-23 DIAGNOSIS — D50.0 IRON DEFICIENCY ANEMIA DUE TO CHRONIC BLOOD LOSS: Primary | ICD-10-CM

## 2018-07-23 DIAGNOSIS — D50.8 OTHER IRON DEFICIENCY ANEMIA: Primary | ICD-10-CM

## 2018-07-23 LAB
BASOPHILS # BLD AUTO: 0.06 10*3/MM3 (ref 0–0.1)
BASOPHILS NFR BLD AUTO: 1 % (ref 0–1.1)
DEPRECATED RDW RBC AUTO: 43.1 FL (ref 37–49)
EOSINOPHIL # BLD AUTO: 0.14 10*3/MM3 (ref 0–0.36)
EOSINOPHIL NFR BLD AUTO: 2.2 % (ref 1–5)
ERYTHROCYTE [DISTWIDTH] IN BLOOD BY AUTOMATED COUNT: 13.2 % (ref 11.7–14.5)
FERRITIN SERPL-MCNC: 77.1 NG/ML (ref 30–400)
HCT VFR BLD AUTO: 46.7 % (ref 40–49)
HGB BLD-MCNC: 15.5 G/DL (ref 13.5–16.5)
IMM GRANULOCYTES # BLD: 0.03 10*3/MM3 (ref 0–0.03)
IMM GRANULOCYTES NFR BLD: 0.5 % (ref 0–0.5)
IRON 24H UR-MRATE: 104 MCG/DL (ref 59–158)
IRON SATN MFR SERPL: 30 % (ref 14–48)
LYMPHOCYTES # BLD AUTO: 0.97 10*3/MM3 (ref 1–3.5)
LYMPHOCYTES NFR BLD AUTO: 15.4 % (ref 20–49)
MCH RBC QN AUTO: 29.7 PG (ref 27–33)
MCHC RBC AUTO-ENTMCNC: 33.2 G/DL (ref 32–35)
MCV RBC AUTO: 89.5 FL (ref 83–97)
MONOCYTES # BLD AUTO: 0.44 10*3/MM3 (ref 0.25–0.8)
MONOCYTES NFR BLD AUTO: 7 % (ref 4–12)
NEUTROPHILS # BLD AUTO: 4.64 10*3/MM3 (ref 1.5–7)
NEUTROPHILS NFR BLD AUTO: 73.9 % (ref 39–75)
NRBC BLD MANUAL-RTO: 0 /100 WBC (ref 0–0)
PLATELET # BLD AUTO: 333 10*3/MM3 (ref 150–375)
PMV BLD AUTO: 8.7 FL (ref 8.9–12.1)
RBC # BLD AUTO: 5.22 10*6/MM3 (ref 4.3–5.5)
TIBC SERPL-MCNC: 344 MCG/DL (ref 249–505)
TRANSFERRIN SERPL-MCNC: 246 MG/DL (ref 200–360)
WBC NRBC COR # BLD: 6.28 10*3/MM3 (ref 4–10)

## 2018-07-23 PROCEDURE — 36415 COLL VENOUS BLD VENIPUNCTURE: CPT | Performed by: INTERNAL MEDICINE

## 2018-07-23 PROCEDURE — 82728 ASSAY OF FERRITIN: CPT | Performed by: INTERNAL MEDICINE

## 2018-07-23 PROCEDURE — 84466 ASSAY OF TRANSFERRIN: CPT | Performed by: INTERNAL MEDICINE

## 2018-07-23 PROCEDURE — 83540 ASSAY OF IRON: CPT | Performed by: INTERNAL MEDICINE

## 2018-07-23 PROCEDURE — 85025 COMPLETE CBC W/AUTO DIFF WBC: CPT | Performed by: INTERNAL MEDICINE

## 2018-07-23 PROCEDURE — 99213 OFFICE O/P EST LOW 20 MIN: CPT | Performed by: INTERNAL MEDICINE

## 2018-07-23 RX ORDER — LISINOPRIL 10 MG/1
TABLET ORAL
COMMUNITY
Start: 2018-06-26 | End: 2018-10-04 | Stop reason: SDUPTHER

## 2018-07-30 ENCOUNTER — TELEPHONE (OUTPATIENT)
Dept: ORTHOPEDIC SURGERY | Facility: CLINIC | Age: 73
End: 2018-07-30

## 2018-07-30 DIAGNOSIS — M54.50 LUMBAR SPINE PAIN: Primary | ICD-10-CM

## 2018-08-09 ENCOUNTER — HOSPITAL ENCOUNTER (OUTPATIENT)
Dept: PAIN MEDICINE | Facility: HOSPITAL | Age: 73
Discharge: HOME | End: 2018-08-09
Admitting: ANESTHESIOLOGY

## 2018-08-09 ENCOUNTER — ANESTHESIA EVENT (OUTPATIENT)
Dept: PAIN MEDICINE | Facility: HOSPITAL | Age: 73
End: 2018-08-09

## 2018-08-09 ENCOUNTER — HOSPITAL ENCOUNTER (OUTPATIENT)
Dept: GENERAL RADIOLOGY | Facility: HOSPITAL | Age: 73
Discharge: HOME | End: 2018-08-09

## 2018-08-09 ENCOUNTER — ANESTHESIA (OUTPATIENT)
Dept: PAIN MEDICINE | Facility: HOSPITAL | Age: 73
End: 2018-08-09

## 2018-08-09 VITALS
TEMPERATURE: 97.8 F | SYSTOLIC BLOOD PRESSURE: 134 MMHG | HEART RATE: 65 BPM | RESPIRATION RATE: 16 BRPM | WEIGHT: 238 LBS | HEIGHT: 69 IN | OXYGEN SATURATION: 94 % | BODY MASS INDEX: 35.25 KG/M2 | DIASTOLIC BLOOD PRESSURE: 69 MMHG

## 2018-08-09 DIAGNOSIS — R52 PAIN: ICD-10-CM

## 2018-08-09 PROCEDURE — 77003 FLUOROGUIDE FOR SPINE INJECT: CPT

## 2018-08-09 PROCEDURE — C1755 CATHETER, INTRASPINAL: HCPCS

## 2018-08-09 PROCEDURE — 25010000002 METHYLPREDNISOLONE PER 80 MG: Performed by: ANESTHESIOLOGY

## 2018-08-09 RX ORDER — LIDOCAINE HYDROCHLORIDE 10 MG/ML
1 INJECTION, SOLUTION INFILTRATION; PERINEURAL ONCE
Status: DISCONTINUED | OUTPATIENT
Start: 2018-08-09 | End: 2018-08-10 | Stop reason: HOSPADM

## 2018-08-09 RX ORDER — FENTANYL CITRATE 50 UG/ML
50 INJECTION, SOLUTION INTRAMUSCULAR; INTRAVENOUS AS NEEDED
Status: DISCONTINUED | OUTPATIENT
Start: 2018-08-09 | End: 2018-08-10 | Stop reason: HOSPADM

## 2018-08-09 RX ORDER — MIDAZOLAM HYDROCHLORIDE 1 MG/ML
1 INJECTION INTRAMUSCULAR; INTRAVENOUS
Status: DISCONTINUED | OUTPATIENT
Start: 2018-08-09 | End: 2018-08-10 | Stop reason: HOSPADM

## 2018-08-09 RX ORDER — METHYLPREDNISOLONE ACETATE 80 MG/ML
80 INJECTION, SUSPENSION INTRA-ARTICULAR; INTRALESIONAL; INTRAMUSCULAR; SOFT TISSUE ONCE
Status: COMPLETED | OUTPATIENT
Start: 2018-08-09 | End: 2018-08-09

## 2018-08-09 RX ADMIN — METHYLPREDNISOLONE ACETATE 80 MG: 80 INJECTION, SUSPENSION INTRA-ARTICULAR; INTRALESIONAL; INTRAMUSCULAR; SOFT TISSUE at 08:56

## 2018-08-09 NOTE — ANESTHESIA PROCEDURE NOTES
PAIN Epidural block    Patient location during procedure: pain clinic  Indication:procedure for pain  Performed By  Anesthesiologist: LUIS EDUARDO WEBSTER  Preanesthetic Checklist  Completed: patient identified, site marked, surgical consent, pre-op evaluation, timeout performed, risks and benefits discussed and monitors and equipment checked  Additional Notes  Depomedrol - 80mg    Needle position confirmed by fluoroscopy. Contrast not used due to renal function.    Diagnosis  Post-Op Diagnosis Codes:     * Lumbar degenerative disc disease (M51.36)     * Lumbar radiculopathy (M54.16)    Prep:  Pt Position:prone  Sterile Tech:cap, gloves, mask and sterile barrier  Prep:chlorhexidine gluconate and isopropyl alcohol  Monitoring:blood pressure monitoring, continuous pulse oximetry and EKG  Procedure:  Sedation: no   Approach:right paramedian  Guidance: fluoroscopy  Location:lumbar  Level:4-5  Needle Type:Tuohy  Needle Gauge:20  Aspiration:negative  Medications:  Depomedrol:80 mg  Preservative Free Saline:2mL  Isovue:0mL    Post Assessment:  Post-procedure: bandaide.  Pt Tolerance:patient tolerated the procedure well with no apparent complications  Complications:no

## 2018-08-09 NOTE — H&P
[facility]    History and Physical    Patient Name: Danyel Dash  :  1945  MRN:  [id number]  Date of Admission: 2018    Subjective     Patient is a 72 y.o. male presents with chief complaint of chronic, intermitent, moderate, severe low back and hips: bilateral pain.  Onset of symptoms was gradual starting several months ago.  Symptoms are associated/aggravated by lifting, standing, straining or twisting. Symptoms improve with relaxation    The following portions of the patients history were reviewed and updated as appropriate: current medications, allergies, past medical history, past surgical history, past family history, past social history and problem list                Objective     Past Medical History:   Past Medical History:   Diagnosis Date   • Anemia    • Arthritis    • Chronic kidney disease    • Coronary artery disease     rheumatic fever at age 7   • Diabetes mellitus (CMS/HCC)    • GERD (gastroesophageal reflux disease)    • GI bleed    • Hyperlipidemia    • Hypertension    • Low back pain    • Rheumatic fever      Past Surgical History:   Past Surgical History:   Procedure Laterality Date   • CATARACT EXTRACTION Bilateral    • COLONOSCOPY N/A 2016    tics, IH, polyps   • ENDOSCOPY N/A 2016    LA Grade B reflux esophagitis, erythematous mucosa in stomach   • ENDOSCOPY N/A 2017    Two jejunal AVM's    • ENTEROSCOPY SMALL BOWEL  2017    Procedure: ENTEROSCOPY SMALL BOWEL;  Surgeon: Anil Prakash MD;  Location: [facility] ENDOSCOPY;  Service:    • EPIDURAL BLOCK     • KNEE ARTHROSCOPY Right    • SHOULDER ARTHROSCOPY Right    • TONSILLECTOMY       Family History:   Family History   Problem Relation Age of Onset   • Diverticulosis Father    • Diverticulitis Father    • Alzheimer's disease Mother    • No Known Problems Sister      Social History:   Social History   Substance Use Topics   • Smoking status: Former Smoker     Packs/day: 1.50     Years: 18.00     Types:  "Cigarettes     Quit date: 2000   • Smokeless tobacco: Never Used      Comment: quit 1997   • Alcohol use No       Vital Signs Range for the last 24 hours  Temperature: Temp:  [36.6 °C (97.8 °F)] 36.6 °C (97.8 °F)   Temp Source: Temp src: Oral   BP: BP: (143)/(73) 143/73   Pulse: Heart Rate:  [66] 66   Respirations: Resp:  [16] 16   SPO2: SpO2:  [95 %] 95 %   O2 Amount (l/min):     O2 Devices Device (Oxygen Therapy): room air   Weight: Weight:  [108 kg (238 lb)] 108 kg (238 lb)     Flowsheet Rows         First Filed Value    Admission Height  69\" (175.3 cm) Documented at 03/23/2017 1000    Admission Weight            --------------------------------------------------------------------------------    Current Outpatient Prescriptions   Medication Sig Dispense Refill   • albuterol (PROVENTIL HFA;VENTOLIN HFA) 108 (90 Base) MCG/ACT inhaler Inhale 2 puffs Every 4 (Four) Hours As Needed for Wheezing. 6.7 g 0   • amLODIPine (NORVASC) 10 MG tablet Take 1 tablet by mouth Daily. 90 tablet 0   • buPROPion XL (WELLBUTRIN XL) 300 MG 24 hr tablet Take 1 tablet by mouth Daily. 90 tablet 3   • cetirizine (zyrTEC) 10 MG tablet Take 1 tablet by mouth Daily for 30 days. 30 tablet 0   • Cholecalciferol (VITAMIN D-3) 1000 UNITS capsule Take 1,000 Units by mouth Daily.     • Iron, Ferrous Gluconate, 256 (28 FE) MG tablet Take 1 tablet by mouth daily.     • omeprazole (priLOSEC) 40 MG capsule Take 1 capsule by mouth Daily. 90 capsule 3   • pioglitazone (ACTOS) 45 MG tablet Take 1 tablet by mouth Daily. 90 tablet 3   • potassium chloride (KLOR-CON) 8 MEQ CR tablet Take 8 mEq by mouth 2 (Two) Times a Day.     • vitamin B-12 (CYANOCOBALAMIN) 1000 MCG tablet Take 1,000 mcg by mouth Daily.     • vitamin C (ASCORBIC ACID) 500 MG tablet Take 500 mg by mouth Daily.     • aspirin 81 MG EC tablet Take 81 mg by mouth Daily.     • Blood Gluc Meter Disp-Strips device Pt to monitor blood glucose two times daily 1 each 0   • fluticasone (FLONASE) 50 " MCG/ACT nasal spray 2 sprays into each nostril Daily. 15.8 mL 0   • lisinopril (PRINIVIL,ZESTRIL) 10 MG tablet        No current facility-administered medications for this encounter.        --------------------------------------------------------------------------------  Assessment/Plan      Anesthesia Evaluation     Patient summary reviewed and Nursing notes reviewed   no history of anesthetic complications:  NPO Solid Status: > 6 hours  NPO Liquid Status: > 2 hours    Pain impairs ability to perform ADLs: Sleeping  Modalities previously tried to control pain with limited effectiveness: Rest     Airway   Mallampati: II  TM distance: >3 FB  Neck ROM: full  Dental - normal exam     Pulmonary - negative pulmonary ROS and normal exam   Cardiovascular - normal exam    (+) hypertension, CAD,       Neuro/Psych- neuro exam normal  GI/Hepatic/Renal/Endo    (+)  GERD,  diabetes mellitus,     Musculoskeletal     (+) back pain, radiculopathy Left lower extremity and Right lower extremity  Abdominal  - normal exam   Substance History      OB/GYN          Other   (+) arthritis                Diagnosis and Plan      Treatment Plan  ASA 2   Patient has had previous injection/procedure with 25-50% improvement.   Procedures: Lumbar Epidural Steroid Injection(LESI), With fluoroscopy,       Anesthetic plan and risks discussed with patient.          Diagnosis     * Lumbar degenerative disc disease [M51.36]     * Lumbar radiculopathy [M54.16]

## 2018-09-17 DIAGNOSIS — F41.9 ANXIETY: ICD-10-CM

## 2018-09-17 RX ORDER — BUPROPION HYDROCHLORIDE 300 MG/1
TABLET ORAL
Qty: 90 TABLET | Refills: 2 | Status: SHIPPED | OUTPATIENT
Start: 2018-09-17 | End: 2019-06-19 | Stop reason: SDUPTHER

## 2018-10-04 ENCOUNTER — OFFICE VISIT (OUTPATIENT)
Dept: FAMILY MEDICINE CLINIC | Facility: CLINIC | Age: 73
End: 2018-10-04

## 2018-10-04 VITALS
HEART RATE: 74 BPM | HEIGHT: 69 IN | BODY MASS INDEX: 36.53 KG/M2 | DIASTOLIC BLOOD PRESSURE: 70 MMHG | OXYGEN SATURATION: 99 % | WEIGHT: 246.6 LBS | SYSTOLIC BLOOD PRESSURE: 134 MMHG | TEMPERATURE: 98.4 F

## 2018-10-04 DIAGNOSIS — I10 ESSENTIAL HYPERTENSION: ICD-10-CM

## 2018-10-04 DIAGNOSIS — Z12.5 SPECIAL SCREENING FOR MALIGNANT NEOPLASM OF PROSTATE: ICD-10-CM

## 2018-10-04 DIAGNOSIS — K21.9 GASTROESOPHAGEAL REFLUX DISEASE, ESOPHAGITIS PRESENCE NOT SPECIFIED: ICD-10-CM

## 2018-10-04 DIAGNOSIS — E11.9 TYPE 2 DIABETES MELLITUS WITHOUT COMPLICATION, WITHOUT LONG-TERM CURRENT USE OF INSULIN (HCC): Primary | ICD-10-CM

## 2018-10-04 DIAGNOSIS — E78.01 FAMILIAL HYPERCHOLESTEROLEMIA: ICD-10-CM

## 2018-10-04 PROBLEM — N42.9 PROSTATE DISORDER: Status: RESOLVED | Noted: 2017-10-06 | Resolved: 2018-10-04

## 2018-10-04 LAB
ALBUMIN SERPL-MCNC: 4.4 G/DL (ref 3.5–5.2)
ALBUMIN/GLOB SERPL: 2.1 G/DL
ALP SERPL-CCNC: 54 U/L (ref 39–117)
ALT SERPL-CCNC: 16 U/L (ref 1–41)
AST SERPL-CCNC: 13 U/L (ref 1–40)
BILIRUB SERPL-MCNC: 0.8 MG/DL (ref 0.1–1.2)
BUN SERPL-MCNC: 17 MG/DL (ref 8–23)
BUN/CREAT SERPL: 13.9 (ref 7–25)
CALCIUM SERPL-MCNC: 9.7 MG/DL (ref 8.6–10.5)
CHLORIDE SERPL-SCNC: 100 MMOL/L (ref 98–107)
CHOLEST SERPL-MCNC: 146 MG/DL (ref 0–200)
CO2 SERPL-SCNC: 26.9 MMOL/L (ref 22–29)
CREAT SERPL-MCNC: 1.22 MG/DL (ref 0.76–1.27)
GLOBULIN SER CALC-MCNC: 2.1 GM/DL
GLUCOSE SERPL-MCNC: 142 MG/DL (ref 65–99)
HBA1C MFR BLD: 6.01 % (ref 4.8–5.6)
HDLC SERPL-MCNC: 40 MG/DL (ref 40–60)
LDLC SERPL CALC-MCNC: 83 MG/DL (ref 0–100)
LDLC/HDLC SERPL: 2.07 {RATIO}
POTASSIUM SERPL-SCNC: 4.4 MMOL/L (ref 3.5–5.2)
PROT SERPL-MCNC: 6.5 G/DL (ref 6–8.5)
PSA SERPL-MCNC: 0.69 NG/ML (ref 0–4)
SODIUM SERPL-SCNC: 139 MMOL/L (ref 136–145)
TRIGL SERPL-MCNC: 116 MG/DL (ref 0–150)
VLDLC SERPL CALC-MCNC: 23.2 MG/DL (ref 5–40)

## 2018-10-04 PROCEDURE — 99214 OFFICE O/P EST MOD 30 MIN: CPT | Performed by: NURSE PRACTITIONER

## 2018-10-04 RX ORDER — OMEPRAZOLE 40 MG/1
40 CAPSULE, DELAYED RELEASE ORAL DAILY
Qty: 90 CAPSULE | Refills: 3 | Status: SHIPPED | OUTPATIENT
Start: 2018-10-04 | End: 2019-10-07 | Stop reason: SDUPTHER

## 2018-10-04 RX ORDER — LISINOPRIL 10 MG/1
10 TABLET ORAL DAILY
Qty: 90 TABLET | Refills: 3 | Status: SHIPPED | OUTPATIENT
Start: 2018-10-04 | End: 2018-12-27 | Stop reason: SDUPTHER

## 2018-10-04 RX ORDER — PIOGLITAZONEHYDROCHLORIDE 45 MG/1
45 TABLET ORAL DAILY
Qty: 90 TABLET | Refills: 3 | Status: SHIPPED | OUTPATIENT
Start: 2018-10-04 | End: 2018-12-27 | Stop reason: SDUPTHER

## 2018-10-04 RX ORDER — SIMVASTATIN 20 MG
TABLET ORAL
COMMUNITY
Start: 2018-09-23 | End: 2018-10-04 | Stop reason: SDUPTHER

## 2018-10-04 RX ORDER — SIMVASTATIN 20 MG
20 TABLET ORAL NIGHTLY
Qty: 90 TABLET | Refills: 3 | Status: SHIPPED | OUTPATIENT
Start: 2018-10-04 | End: 2019-03-20 | Stop reason: SDUPTHER

## 2018-10-04 RX ORDER — AMLODIPINE BESYLATE 10 MG/1
10 TABLET ORAL DAILY
Qty: 90 TABLET | Refills: 3 | Status: SHIPPED | OUTPATIENT
Start: 2018-10-04 | End: 2019-10-07 | Stop reason: SDUPTHER

## 2018-10-04 NOTE — PROGRESS NOTES
Subjective   Danyel Dahs is a 72 y.o. male.     History of Present Illness   Danyel Dash 72 y.o. male who presents today for routine follow up check and medication refills.  he has a history of   Patient Active Problem List   Diagnosis   • Anemia   • Anxiety   • Chronic kidney disease, stage III (moderate) (CMS/HCC)   • Diabetes mellitus (CMS/[facility])   • Erectile dysfunction of nonorganic origin   • Gastroesophageal reflux disease   • Hyperlipidemia   • Essential hypertension   • Iron deficiency anemia   • Special screening for malignant neoplasm of prostate   .  Since the last visit, he has overall felt well.  He has Hypertenision and is well controlled on medication, GERD and is well controlled on PPI medication and needing labs for lipids and diabetes.  he has been compliant with current medications have reviewed them.  The patient denies medication side effects.    Results for orders placed or performed in visit on 07/23/18   Ferritin   Result Value Ref Range    Ferritin 77.10 30.00 - 400.00 ng/mL   Iron Profile   Result Value Ref Range    Iron 104 59 - 158 mcg/dL    Iron Saturation 30 14 - 48 %    Transferrin 246 200 - 360 mg/dL    TIBC 344 249 - 505 mcg/dL   CBC Auto Differential   Result Value Ref Range    WBC 6.28 4.00 - 10.00 10*3/mm3    RBC 5.22 4.30 - 5.50 10*6/mm3    Hemoglobin 15.5 13.5 - 16.5 g/dL    Hematocrit 46.7 40.0 - 49.0 %    MCV 89.5 83.0 - 97.0 fL    MCH 29.7 27.0 - 33.0 pg    MCHC 33.2 32.0 - 35.0 g/dL    RDW 13.2 11.7 - 14.5 %    RDW-SD 43.1 37.0 - 49.0 fl    MPV 8.7 (L) 8.9 - 12.1 fL    Platelets 333 150 - 375 10*3/mm3    Neutrophil % 73.9 39.0 - 75.0 %    Lymphocyte % 15.4 (L) 20.0 - 49.0 %    Monocyte % 7.0 4.0 - 12.0 %    Eosinophil % 2.2 1.0 - 5.0 %    Basophil % 1.0 0.0 - 1.1 %    Immature Grans % 0.5 0.0 - 0.5 %    Neutrophils, Absolute 4.64 1.50 - 7.00 10*3/mm3    Lymphocytes, Absolute 0.97 (L) 1.00 - 3.50 10*3/mm3    Monocytes, Absolute 0.44 0.25 - 0.80 10*3/mm3    Eosinophils,  "Absolute 0.14 0.00 - 0.36 10*3/mm3    Basophils, Absolute 0.06 0.00 - 0.10 10*3/mm3    Immature Grans, Absolute 0.03 0.00 - 0.03 10*3/mm3    nRBC 0.0 0.0 - 0.0 /100 WBC         The following portions of the patient's history were reviewed and updated as appropriate: allergies, current medications, past social history and problem list.    Review of Systems   All other systems reviewed and are negative.      Objective   /70 (BP Location: Right arm, Patient Position: Sitting)   Pulse 74   Temp 98.4 °F (36.9 °C)   Ht 175.3 cm (69.02\")   Wt 112 kg (246 lb 9.6 oz)   SpO2 99%   BMI 36.40 kg/m²   Physical Exam   Constitutional: He is oriented to person, place, and time. Vital signs are normal. He appears well-developed and well-nourished. No distress.   HENT:   Head: Normocephalic.   Cardiovascular: Normal rate, regular rhythm and normal heart sounds.    Pulmonary/Chest: Effort normal and breath sounds normal.   Neurological: He is alert and oriented to person, place, and time. Gait normal.   Psychiatric: He has a normal mood and affect. His behavior is normal. Judgment and thought content normal.   Vitals reviewed.      Assessment/Plan      Diagnosis Plan   1. Type 2 diabetes mellitus without complication, without long-term current use of insulin (CMS/[facility])  Comprehensive Metabolic Panel    Hemoglobin A1c    pioglitazone (ACTOS) 45 MG tablet   2. Gastroesophageal reflux disease, esophagitis presence not specified  omeprazole (priLOSEC) 40 MG capsule   3. Familial hypercholesterolemia  Lipid Panel With LDL / HDL Ratio    simvastatin (ZOCOR) 20 MG tablet   4. Essential hypertension  amLODIPine (NORVASC) 10 MG tablet    lisinopril (PRINIVIL,ZESTRIL) 10 MG tablet   5. Special screening for malignant neoplasm of prostate  PSA Screen     rto in 6 mons  Follow up after labs     Dereje Loya, JOHANA  10/4/2018    "

## 2018-12-06 ENCOUNTER — OFFICE VISIT (OUTPATIENT)
Dept: GASTROENTEROLOGY | Facility: CLINIC | Age: 73
End: 2018-12-06

## 2018-12-06 VITALS
TEMPERATURE: 97.7 F | DIASTOLIC BLOOD PRESSURE: 82 MMHG | SYSTOLIC BLOOD PRESSURE: 132 MMHG | BODY MASS INDEX: 37.5 KG/M2 | WEIGHT: 253.2 LBS | HEIGHT: 69 IN

## 2018-12-06 DIAGNOSIS — D50.0 IRON DEFICIENCY ANEMIA DUE TO CHRONIC BLOOD LOSS: Primary | ICD-10-CM

## 2018-12-06 DIAGNOSIS — K21.9 GASTROESOPHAGEAL REFLUX DISEASE, ESOPHAGITIS PRESENCE NOT SPECIFIED: ICD-10-CM

## 2018-12-06 DIAGNOSIS — Z87.74 H/O ARTERIOVENOUS MALFORMATION (AVM): ICD-10-CM

## 2018-12-06 DIAGNOSIS — Z87.19 HISTORY OF GI BLEED: ICD-10-CM

## 2018-12-06 LAB
ALBUMIN SERPL-MCNC: 4.5 G/DL (ref 3.5–5.2)
ALBUMIN/GLOB SERPL: 2.3 G/DL
ALP SERPL-CCNC: 55 U/L (ref 39–117)
ALT SERPL-CCNC: 14 U/L (ref 1–41)
AST SERPL-CCNC: 11 U/L (ref 1–40)
BASOPHILS # BLD AUTO: 0.02 10*3/MM3 (ref 0–0.2)
BASOPHILS NFR BLD AUTO: 0.3 % (ref 0–1.5)
BILIRUB SERPL-MCNC: 0.7 MG/DL (ref 0.1–1.2)
BUN SERPL-MCNC: 18 MG/DL (ref 8–23)
BUN/CREAT SERPL: 14.5 (ref 7–25)
CALCIUM SERPL-MCNC: 9.6 MG/DL (ref 8.6–10.5)
CHLORIDE SERPL-SCNC: 103 MMOL/L (ref 98–107)
CO2 SERPL-SCNC: 28.5 MMOL/L (ref 22–29)
CREAT SERPL-MCNC: 1.24 MG/DL (ref 0.76–1.27)
EOSINOPHIL # BLD AUTO: 0.16 10*3/MM3 (ref 0–0.7)
EOSINOPHIL NFR BLD AUTO: 2.3 % (ref 0.3–6.2)
ERYTHROCYTE [DISTWIDTH] IN BLOOD BY AUTOMATED COUNT: 13 % (ref 11.5–14.5)
GLOBULIN SER CALC-MCNC: 2 GM/DL
GLUCOSE SERPL-MCNC: 134 MG/DL (ref 65–99)
HCT VFR BLD AUTO: 45.5 % (ref 40.4–52.2)
HGB BLD-MCNC: 14 G/DL (ref 13.7–17.6)
IMM GRANULOCYTES # BLD: 0.02 10*3/MM3 (ref 0–0.03)
IMM GRANULOCYTES NFR BLD: 0.3 % (ref 0–0.5)
LYMPHOCYTES # BLD AUTO: 1.05 10*3/MM3 (ref 0.9–4.8)
LYMPHOCYTES NFR BLD AUTO: 15.4 % (ref 19.6–45.3)
MCH RBC QN AUTO: 29.8 PG (ref 27–32.7)
MCHC RBC AUTO-ENTMCNC: 30.8 G/DL (ref 32.6–36.4)
MCV RBC AUTO: 96.8 FL (ref 79.8–96.2)
MONOCYTES # BLD AUTO: 0.54 10*3/MM3 (ref 0.2–1.2)
MONOCYTES NFR BLD AUTO: 7.9 % (ref 5–12)
NEUTROPHILS # BLD AUTO: 5.04 10*3/MM3 (ref 1.9–8.1)
NEUTROPHILS NFR BLD AUTO: 73.8 % (ref 42.7–76)
PLATELET # BLD AUTO: 306 10*3/MM3 (ref 140–500)
POTASSIUM SERPL-SCNC: 4.6 MMOL/L (ref 3.5–5.2)
PROT SERPL-MCNC: 6.5 G/DL (ref 6–8.5)
RBC # BLD AUTO: 4.7 10*6/MM3 (ref 4.6–6)
SODIUM SERPL-SCNC: 142 MMOL/L (ref 136–145)
WBC # BLD AUTO: 6.83 10*3/MM3 (ref 4.5–10.7)

## 2018-12-06 PROCEDURE — 99214 OFFICE O/P EST MOD 30 MIN: CPT | Performed by: NURSE PRACTITIONER

## 2018-12-06 NOTE — PROGRESS NOTES
Chief Complaint   Patient presents with   • Anemia       Danyel Dash is a  73 y.o. male here for a follow up visit for GERD.    HPI  72 yo m presents today for follow up visit for GERD with hx GI bleed secondary to AVMs. He is a patient of Dr. Prakash. He was last seen in the office on 5/14/18. He has hx GERD and admits the omeprazole works well for him. He also has hx GI bleed secondary to some AVMs in the small bowel. He has had enteroscopy with burning of those in the past. He denies any signs of active GI bleeding. He denies any dysphagia, reflux, abd pain, N&V, diarrhea, constipation, rectal bleeding or melena. He admits his appetite is good and his weight is stable. He does have hx SUKUMAR due to chronic blood loss and takes Iron daily. He also sees a hematologist routinely.   Past Medical History:   Diagnosis Date   • Anemia    • Arthritis    • Chronic kidney disease    • Coronary artery disease     rheumatic fever at age 7   • Diabetes mellitus (CMS/HCC)    • GERD (gastroesophageal reflux disease)    • GI bleed    • Hyperlipidemia    • Hypertension    • Low back pain    • Rheumatic fever        Past Surgical History:   Procedure Laterality Date   • CATARACT EXTRACTION Bilateral    • EPIDURAL BLOCK     • KNEE ARTHROSCOPY Right    • SHOULDER ARTHROSCOPY Right    • TONSILLECTOMY         Scheduled Meds:    Continuous Infusions:  No current facility-administered medications for this visit.     PRN Meds:.    No Known Allergies    Social History     Socioeconomic History   • Marital status:      Spouse name: Lea   • Number of children: 3   • Years of education: Not on file   • Highest education level: Not on file   Social Needs   • Financial resource strain: Not on file   • Food insecurity - worry: Not on file   • Food insecurity - inability: Not on file   • Transportation needs - medical: Not on file   • Transportation needs - non-medical: Not on file   Occupational History   • Occupation:       Employer: RETIRED   Tobacco Use   • Smoking status: Former Smoker     Packs/day: 1.50     Years: 18.00     Pack years: 27.00     Types: Cigarettes     Last attempt to quit: 2000     Years since quittin.9   • Smokeless tobacco: Former User   • Tobacco comment: quit    Substance and Sexual Activity   • Alcohol use: No   • Drug use: No   • Sexual activity: Defer   Other Topics Concern   • Not on file   Social History Narrative   • Not on file       Family History   Problem Relation Age of Onset   • Diverticulosis Father    • Diverticulitis Father    • Alzheimer's disease Mother    • No Known Problems Sister        Review of Systems   Constitutional: Negative for appetite change, chills, diaphoresis, fatigue, fever and unexpected weight change.   HENT: Negative for nosebleeds, postnasal drip, sore throat, trouble swallowing and voice change.    Respiratory: Negative for cough, choking, chest tightness, shortness of breath and wheezing.    Cardiovascular: Negative for chest pain.   Gastrointestinal: Negative for abdominal distention, abdominal pain, anal bleeding, blood in stool, constipation, diarrhea, nausea, rectal pain and vomiting.   Endocrine: Negative for polydipsia, polyphagia and polyuria.   Musculoskeletal: Negative for gait problem.   Skin: Negative for rash and wound.   Allergic/Immunologic: Negative for food allergies.   Neurological: Negative for dizziness, speech difficulty and light-headedness.   Psychiatric/Behavioral: Negative for confusion, self-injury, sleep disturbance and suicidal ideas.       Vitals:    18 1020   BP: 132/82   Temp: 97.7 °F (36.5 °C)       Physical Exam   Constitutional: He is oriented to person, place, and time. He appears well-developed and well-nourished. He does not appear ill. No distress.   HENT:   Head: Normocephalic.   Eyes: Pupils are equal, round, and reactive to light.   Cardiovascular: Normal rate, regular rhythm and normal heart sounds.    Pulmonary/Chest: Effort normal and breath sounds normal.   Abdominal: Soft. Bowel sounds are normal. He exhibits no distension and no mass. There is no hepatosplenomegaly. There is no tenderness. There is no rebound and no guarding. No hernia.   Musculoskeletal: Normal range of motion.   Neurological: He is alert and oriented to person, place, and time.   Skin: Skin is warm and dry.   Psychiatric: He has a normal mood and affect. His speech is normal and behavior is normal. Judgment normal.       No images are attached to the encounter.    Assessment & Plan     1. Iron deficiency anemia due to chronic blood loss  - CBC & Differential  - Comprehensive Metabolic Panel    2. History of GI bleed  - CBC & Differential  - Comprehensive Metabolic Panel    3. H/O arteriovenous malformation (AVM)    4. Gastroesophageal reflux disease, esophagitis presence not specified  - Comprehensive Metabolic Panel    NO signs of active GI bleeding noted today. Hgb at 15.5 on 7/18. Will check labs today. Patient seems to be doing well at this time. GERD Is well controlled on PPI. Will have him follow up with Dr. Prakash in 6 months. Call the office with any issues. Follow up with hematology as planned.

## 2018-12-07 ENCOUNTER — TELEPHONE (OUTPATIENT)
Dept: GASTROENTEROLOGY | Facility: CLINIC | Age: 73
End: 2018-12-07

## 2018-12-07 NOTE — TELEPHONE ENCOUNTER
Called pt and on identified vm advised per Nan Np that all his labs looked good and to call with any question.

## 2018-12-07 NOTE — TELEPHONE ENCOUNTER
----- Message from JOHANA Kamara sent at 12/6/2018  4:40 PM EST -----  Please call patient and let him know all his labs looked good. Thanks.

## 2018-12-27 ENCOUNTER — TELEPHONE (OUTPATIENT)
Dept: FAMILY MEDICINE CLINIC | Facility: CLINIC | Age: 73
End: 2018-12-27

## 2018-12-27 DIAGNOSIS — E11.9 TYPE 2 DIABETES MELLITUS WITHOUT COMPLICATION, WITHOUT LONG-TERM CURRENT USE OF INSULIN (HCC): ICD-10-CM

## 2018-12-27 DIAGNOSIS — I10 ESSENTIAL HYPERTENSION: ICD-10-CM

## 2018-12-27 RX ORDER — PIOGLITAZONEHYDROCHLORIDE 45 MG/1
45 TABLET ORAL DAILY
Qty: 90 TABLET | Refills: 3 | Status: SHIPPED | OUTPATIENT
Start: 2018-12-27 | End: 2018-12-28 | Stop reason: SDUPTHER

## 2018-12-27 RX ORDER — LISINOPRIL 10 MG/1
10 TABLET ORAL DAILY
Qty: 90 TABLET | Refills: 3 | Status: SHIPPED | OUTPATIENT
Start: 2018-12-27 | End: 2018-12-29 | Stop reason: SDUPTHER

## 2018-12-27 NOTE — TELEPHONE ENCOUNTER
Patient is requesting refills on the following medications. He lost the hard copies you wrote for him. Please send these to Conrad at [facility]  Lisinopril 10 mg  Piogliyazone 45 mg

## 2018-12-28 DIAGNOSIS — E11.9 TYPE 2 DIABETES MELLITUS WITHOUT COMPLICATION, WITHOUT LONG-TERM CURRENT USE OF INSULIN (HCC): ICD-10-CM

## 2018-12-28 RX ORDER — PIOGLITAZONEHYDROCHLORIDE 45 MG/1
TABLET ORAL
Qty: 90 TABLET | Refills: 2 | Status: SHIPPED | OUTPATIENT
Start: 2018-12-28 | End: 2019-10-07 | Stop reason: SDUPTHER

## 2018-12-29 DIAGNOSIS — I10 ESSENTIAL HYPERTENSION: ICD-10-CM

## 2018-12-31 RX ORDER — LISINOPRIL 10 MG/1
TABLET ORAL
Qty: 90 TABLET | Refills: 2 | Status: SHIPPED | OUTPATIENT
Start: 2018-12-31 | End: 2019-09-28 | Stop reason: SDUPTHER

## 2019-01-18 ENCOUNTER — TELEPHONE (OUTPATIENT)
Dept: ORTHOPEDIC SURGERY | Facility: CLINIC | Age: 74
End: 2019-01-18

## 2019-01-18 DIAGNOSIS — M48.062 SPINAL STENOSIS OF LUMBAR REGION WITH NEUROGENIC CLAUDICATION: Primary | ICD-10-CM

## 2019-01-23 ENCOUNTER — OFFICE VISIT (OUTPATIENT)
Dept: ONCOLOGY | Facility: CLINIC | Age: 74
End: 2019-01-23

## 2019-01-23 ENCOUNTER — LAB (OUTPATIENT)
Dept: LAB | Facility: HOSPITAL | Age: 74
End: 2019-01-23

## 2019-01-23 VITALS
WEIGHT: 246.5 LBS | SYSTOLIC BLOOD PRESSURE: 145 MMHG | DIASTOLIC BLOOD PRESSURE: 78 MMHG | OXYGEN SATURATION: 95 % | HEIGHT: 69 IN | HEART RATE: 72 BPM | TEMPERATURE: 98.2 F | RESPIRATION RATE: 18 BRPM | BODY MASS INDEX: 36.51 KG/M2

## 2019-01-23 DIAGNOSIS — D50.0 IRON DEFICIENCY ANEMIA DUE TO CHRONIC BLOOD LOSS: ICD-10-CM

## 2019-01-23 DIAGNOSIS — D50.0 IRON DEFICIENCY ANEMIA DUE TO CHRONIC BLOOD LOSS: Primary | ICD-10-CM

## 2019-01-23 LAB
BASOPHILS # BLD AUTO: 0.03 10*3/MM3 (ref 0–0.1)
BASOPHILS NFR BLD AUTO: 0.5 % (ref 0–1.1)
DEPRECATED RDW RBC AUTO: 43.2 FL (ref 37–49)
EOSINOPHIL # BLD AUTO: 0.05 10*3/MM3 (ref 0–0.36)
EOSINOPHIL NFR BLD AUTO: 0.9 % (ref 1–5)
ERYTHROCYTE [DISTWIDTH] IN BLOOD BY AUTOMATED COUNT: 12.8 % (ref 11.7–14.5)
FERRITIN SERPL-MCNC: 53.2 NG/ML (ref 30–400)
HCT VFR BLD AUTO: 44.8 % (ref 40–49)
HGB BLD-MCNC: 14.6 G/DL (ref 13.5–16.5)
IMM GRANULOCYTES # BLD AUTO: 0.01 10*3/MM3 (ref 0–0.03)
IMM GRANULOCYTES NFR BLD AUTO: 0.2 % (ref 0–0.5)
IRON 24H UR-MRATE: 98 MCG/DL (ref 59–158)
IRON SATN MFR SERPL: 26 % (ref 14–48)
LYMPHOCYTES # BLD AUTO: 1 10*3/MM3 (ref 1–3.5)
LYMPHOCYTES NFR BLD AUTO: 18.1 % (ref 20–49)
MCH RBC QN AUTO: 29.9 PG (ref 27–33)
MCHC RBC AUTO-ENTMCNC: 32.6 G/DL (ref 32–35)
MCV RBC AUTO: 91.8 FL (ref 83–97)
MONOCYTES # BLD AUTO: 0.44 10*3/MM3 (ref 0.25–0.8)
MONOCYTES NFR BLD AUTO: 8 % (ref 4–12)
NEUTROPHILS # BLD AUTO: 4 10*3/MM3 (ref 1.5–7)
NEUTROPHILS NFR BLD AUTO: 72.3 % (ref 39–75)
NRBC BLD AUTO-RTO: 0 /100 WBC (ref 0–0)
PLATELET # BLD AUTO: 321 10*3/MM3 (ref 150–375)
PMV BLD AUTO: 9 FL (ref 8.9–12.1)
RBC # BLD AUTO: 4.88 10*6/MM3 (ref 4.3–5.5)
TIBC SERPL-MCNC: 372 MCG/DL (ref 249–505)
TRANSFERRIN SERPL-MCNC: 266 MG/DL (ref 200–360)
WBC NRBC COR # BLD: 5.53 10*3/MM3 (ref 4–10)

## 2019-01-23 PROCEDURE — 85025 COMPLETE CBC W/AUTO DIFF WBC: CPT | Performed by: NURSE PRACTITIONER

## 2019-01-23 PROCEDURE — 36415 COLL VENOUS BLD VENIPUNCTURE: CPT | Performed by: NURSE PRACTITIONER

## 2019-01-23 PROCEDURE — 83540 ASSAY OF IRON: CPT | Performed by: NURSE PRACTITIONER

## 2019-01-23 PROCEDURE — 84466 ASSAY OF TRANSFERRIN: CPT | Performed by: NURSE PRACTITIONER

## 2019-01-23 PROCEDURE — 82728 ASSAY OF FERRITIN: CPT | Performed by: NURSE PRACTITIONER

## 2019-01-23 PROCEDURE — 99213 OFFICE O/P EST LOW 20 MIN: CPT | Performed by: NURSE PRACTITIONER

## 2019-01-23 NOTE — PROGRESS NOTES
History:     Reason for follow up:   1. Iron deficiency anemia secondary to recurrent GI bleeding from AVMs     HPI:  Danyel Dash presents for follow-up of iron deficiency anemia from recurrent GI bleeding from AVMs.  The patient is currently taking ferrous gluconate, 2 tablets in the morning with good tolerance.  He does note occasional black stools, evidence of recurrent GI bleeding from AVMs.  He states this will last a few days and then his stools will return to just a normal dark color related to iron tablets.  He is followed by Dr. Prakash, gastroenterology, and will see him next month.    We reviewed his blood work, showing normal hemoglobin of 14.6, MCV 91. Ferritin 53, Iron sat 26%.    He and his wife are planning a trip to [place] next month and he is looking forward to this. He denies any new concerns today.    I have reviewed, verified and personally updated the past medical, surgical, family and social history.      Past Medical   Past Medical History:   Diagnosis Date   • Anemia    • Arthritis    • Chronic kidney disease    • Coronary artery disease     rheumatic fever at age 7   • Diabetes mellitus (CMS/HCC)    • GERD (gastroesophageal reflux disease)    • GI bleed    • Hyperlipidemia    • Hypertension    • Low back pain    • Rheumatic fever     and   Patient Active Problem List   Diagnosis   • Anemia   • Anxiety   • Chronic kidney disease, stage III (moderate) (CMS/HCC)   • Diabetes mellitus (CMS/HCC)   • Erectile dysfunction of nonorganic origin   • Gastroesophageal reflux disease   • Hyperlipidemia   • Essential hypertension   • Iron deficiency anemia   • Special screening for malignant neoplasm of prostate     Social History   Social History     Socioeconomic History   • Marital status:      Spouse name: Lea   • Number of children: 3   • Years of education: Not on file   • Highest education level: Not on file   Social Needs   • Financial resource strain: Not on file   • Food insecurity  - worry: Not on file   • Food insecurity - inability: Not on file   • Transportation needs - medical: Not on file   • Transportation needs - non-medical: Not on file   Occupational History   • Occupation:      Employer: RETIRED   Tobacco Use   • Smoking status: Former Smoker     Packs/day: 1.50     Years: 18.00     Pack years: 27.00     Types: Cigarettes     Last attempt to quit: 2000     Years since quittin.0   • Smokeless tobacco: Former User   • Tobacco comment: quit    Substance and Sexual Activity   • Alcohol use: No   • Drug use: No   • Sexual activity: Defer   Other Topics Concern   • Not on file   Social History Narrative   • Not on file     Family History  Family History   Problem Relation Age of Onset   • Diverticulosis Father    • Diverticulitis Father    • Alzheimer's disease Mother    • No Known Problems Sister      Allergies  No Known Allergies    Medications: The current medication list was reviewed in the EMR.    Review of Systems  Review of Systems   Constitutional: Negative for activity change, appetite change, chills, diaphoresis, fatigue, fever and unexpected weight change.   HENT: Negative for congestion, hearing loss, nosebleeds, sinus pressure and trouble swallowing.    Respiratory: Negative for cough, chest tightness, shortness of breath and wheezing.    Cardiovascular: Negative for chest pain, palpitations and leg swelling.   Gastrointestinal: Positive for blood in stool (intermittent, not frequent or lasting). Negative for abdominal pain, constipation, diarrhea, nausea and vomiting.   Genitourinary: Negative.    Musculoskeletal: Positive for arthralgias (chronic, stable) and back pain. Negative for neck pain.   Neurological: Negative.    Hematological: Negative.  Negative for adenopathy. Does not bruise/bleed easily.   Psychiatric/Behavioral: Negative.        Objective    Objective:     Vitals:    19 1233   BP: 145/78   Pulse: 72   Resp: 18   Temp: 98.2 °F  "(36.8 °C)   SpO2: 95%   Weight: 112 kg (246 lb 8 oz)   Height: 175.3 cm (69.02\")   PainSc: 0-No pain     Current Status 1/23/2019   ECOG score 0     GENERAL:  Well-developed, well-nourished male in no acute distress. Vital signs reviewed.   SKIN:  Warm, dry without rashes, purpura or petechiae.  EYES:  Pupils equal, round and reactive to light.  EOMs intact.  Conjunctivae normal.  HEAD:  Normocephalic.  EARS/NOSE/MOUTH/THROAT:  Hearing intact. Septum midline.  No excoriations or nasal discharge. No stomatitis or ulcers.  Lips normal. Oropharynx without lesions or exudates.  NECK:  Supple with good range of motion; no thyromegaly or masses  RESP:  Lungs clear to auscultation bilaterally. Good airflow. Normal respiratory effort.   CARDIAC:  Regular rate and rhythm without murmurs, rubs or gallops. Normal S1,S2. No edema  GI:  Soft, nontender, normal bowel sounds  MSK:  No clubbing or cyanosis, No joint swelling noted in hands  PSYCHIATRIC:  Normal affect and mood.  Alert and Oriented x 3.      Labs and Imaging  Results for orders placed or performed in visit on 01/23/19   Ferritin   Result Value Ref Range    Ferritin 53.20 30.00 - 400.00 ng/mL   Iron Profile   Result Value Ref Range    Iron 98 59 - 158 mcg/dL    Iron Saturation 26 14 - 48 %    Transferrin 266 200 - 360 mg/dL    TIBC 372 249 - 505 mcg/dL   CBC Auto Differential   Result Value Ref Range    WBC 5.53 4.00 - 10.00 10*3/mm3    RBC 4.88 4.30 - 5.50 10*6/mm3    Hemoglobin 14.6 13.5 - 16.5 g/dL    Hematocrit 44.8 40.0 - 49.0 %    MCV 91.8 83.0 - 97.0 fL    MCH 29.9 27.0 - 33.0 pg    MCHC 32.6 32.0 - 35.0 g/dL    RDW 12.8 11.7 - 14.5 %    RDW-SD 43.2 37.0 - 49.0 fl    MPV 9.0 8.9 - 12.1 fL    Platelets 321 150 - 375 10*3/mm3    Neutrophil % 72.3 39.0 - 75.0 %    Lymphocyte % 18.1 (L) 20.0 - 49.0 %    Monocyte % 8.0 4.0 - 12.0 %    Eosinophil % 0.9 (L) 1.0 - 5.0 %    Basophil % 0.5 0.0 - 1.1 %    Immature Grans % 0.2 0.0 - 0.5 %    Neutrophils, Absolute 4.00 " 1.50 - 7.00 10*3/mm3    Lymphocytes, Absolute 1.00 1.00 - 3.50 10*3/mm3    Monocytes, Absolute 0.44 0.25 - 0.80 10*3/mm3    Eosinophils, Absolute 0.05 0.00 - 0.36 10*3/mm3    Basophils, Absolute 0.03 0.00 - 0.10 10*3/mm3    Immature Grans, Absolute 0.01 0.00 - 0.03 10*3/mm3    nRBC 0.0 0.0 - 0.0 /100 WBC       Assessment/Plan   Assessment/Plan:   1. Iron deficiency anemia secondary to recurrent GI bleeding from AVMs              * Follows with Dr Prakash. Has had cauterization.              * Tolerates oral iron but he's refractory recently   * status post two doses Feraheme 3/2018              * Hemoglobin remains normal today. Follow up iron studies show normal ferritin and iron saturation.   * Continue ferrous gluconate. Patient states he is take two tabs once daily.    * Repeat CBC, Ferritin, iron profile in 6 months, but if fatigue worsens again, he was reminded to call and we can arrange for him to come in for cbc, iron studies and review.

## 2019-02-04 ENCOUNTER — HOSPITAL ENCOUNTER (OUTPATIENT)
Dept: PAIN MEDICINE | Facility: HOSPITAL | Age: 74
Discharge: HOME | End: 2019-02-04
Attending: ORTHOPAEDIC SURGERY | Admitting: ANESTHESIOLOGY

## 2019-02-04 ENCOUNTER — ANESTHESIA EVENT (OUTPATIENT)
Dept: PAIN MEDICINE | Facility: HOSPITAL | Age: 74
End: 2019-02-04

## 2019-02-04 ENCOUNTER — HOSPITAL ENCOUNTER (OUTPATIENT)
Dept: GENERAL RADIOLOGY | Facility: HOSPITAL | Age: 74
Discharge: HOME | End: 2019-02-04

## 2019-02-04 ENCOUNTER — ANESTHESIA (OUTPATIENT)
Dept: PAIN MEDICINE | Facility: HOSPITAL | Age: 74
End: 2019-02-04

## 2019-02-04 VITALS
OXYGEN SATURATION: 95 % | RESPIRATION RATE: 16 BRPM | TEMPERATURE: 98.1 F | DIASTOLIC BLOOD PRESSURE: 63 MMHG | SYSTOLIC BLOOD PRESSURE: 132 MMHG | HEART RATE: 66 BPM

## 2019-02-04 DIAGNOSIS — R52 PAIN: ICD-10-CM

## 2019-02-04 DIAGNOSIS — M51.36 DDD (DEGENERATIVE DISC DISEASE), LUMBAR: Primary | ICD-10-CM

## 2019-02-04 PROCEDURE — 77003 FLUOROGUIDE FOR SPINE INJECT: CPT

## 2019-02-04 PROCEDURE — C1755 CATHETER, INTRASPINAL: HCPCS

## 2019-02-04 RX ORDER — FENTANYL CITRATE 50 UG/ML
50 INJECTION, SOLUTION INTRAMUSCULAR; INTRAVENOUS AS NEEDED
Status: DISCONTINUED | OUTPATIENT
Start: 2019-02-04 | End: 2019-02-05 | Stop reason: HOSPADM

## 2019-02-04 RX ORDER — LIDOCAINE HYDROCHLORIDE 10 MG/ML
1 INJECTION, SOLUTION INFILTRATION; PERINEURAL ONCE
Status: DISCONTINUED | OUTPATIENT
Start: 2019-02-04 | End: 2019-02-05 | Stop reason: HOSPADM

## 2019-02-04 RX ORDER — METHYLPREDNISOLONE ACETATE 80 MG/ML
80 INJECTION, SUSPENSION INTRA-ARTICULAR; INTRALESIONAL; INTRAMUSCULAR; SOFT TISSUE ONCE
Status: DISCONTINUED | OUTPATIENT
Start: 2019-02-04 | End: 2019-02-05 | Stop reason: HOSPADM

## 2019-02-04 RX ORDER — MIDAZOLAM HYDROCHLORIDE 1 MG/ML
1 INJECTION INTRAMUSCULAR; INTRAVENOUS
Status: DISCONTINUED | OUTPATIENT
Start: 2019-02-04 | End: 2019-02-05 | Stop reason: HOSPADM

## 2019-02-04 NOTE — ANESTHESIA PROCEDURE NOTES
PAIN Epidural block      Patient reassessed immediately prior to procedure    Patient location during procedure: pain clinic  Indication:procedure for pain  Performed By  Anesthesiologist: Ike Montenegro MD  Preanesthetic Checklist  Completed: patient identified, site marked, surgical consent, pre-op evaluation, timeout performed, risks and benefits discussed and monitors and equipment checked  Additional Notes  Depomedrol - 80mg    Needle position confirmed by fluoroscopy and epidurogram using 2cc of [id number].    Diagnosis  Post-Op Diagnosis Codes:     * Lumbar degenerative disc disease (M51.36)     * Lumbar radiculopathy (M54.16)    Prep:  Pt Position:prone  Sterile Tech:cap, gloves, mask and sterile barrier  Prep:chlorhexidine gluconate and isopropyl alcohol  Monitoring:blood pressure monitoring, continuous pulse oximetry and EKG  Procedure:  Sedation: no   Approach:right paramedian  Guidance: fluoroscopy  Location:lumbar  Level:4-5  Needle Type:Tuohy  Needle Gauge:20  Aspiration:negative  Medications:  Depomedrol:80 mg  Preservative Free Saline:2mL  Isovue:2mL    Post Assessment:  Post-procedure: bandaide.  Pt Tolerance:patient tolerated the procedure well with no apparent complications  Complications:no

## 2019-02-04 NOTE — H&P
[facility]    History and Physical    Patient Name: Danyel Dash  :  1945  MRN:  [id number]  Date of Admission: 2019    Subjective     Patient is a 73 y.o. male presents with chief complaint of chronic, intermitent, moderate, severe low back and hips: bilateral pain.  Onset of symptoms was gradual starting several months ago.  Symptoms are associated/aggravated by lifting, standing, straining or twisting. Symptoms improve with relaxation    The following portions of the patients history were reviewed and updated as appropriate: current medications, allergies, past medical history, past surgical history, past family history, past social history and problem list                Objective     Past Medical History:   Past Medical History:   Diagnosis Date   • Anemia    • Arthritis    • Chronic kidney disease    • Coronary artery disease     rheumatic fever at age 7   • Diabetes mellitus (CMS/HCC)    • GERD (gastroesophageal reflux disease)    • GI bleed    • Hyperlipidemia    • Hypertension    • Low back pain    • Rheumatic fever      Past Surgical History:   Past Surgical History:   Procedure Laterality Date   • CATARACT EXTRACTION Bilateral    • COLONOSCOPY N/A 2016    tics, IH, polyps   • ENDOSCOPY N/A 2016    LA Grade B reflux esophagitis, erythematous mucosa in stomach   • ENDOSCOPY N/A 2017    Two jejunal AVM's    • ENTEROSCOPY SMALL BOWEL  2017    Procedure: ENTEROSCOPY SMALL BOWEL;  Surgeon: Anil Prakash MD;  Location: [facility] ENDOSCOPY;  Service:    • EPIDURAL BLOCK     • KNEE ARTHROSCOPY Right    • SHOULDER ARTHROSCOPY Right    • TONSILLECTOMY       Family History:   Family History   Problem Relation Age of Onset   • Diverticulosis Father    • Diverticulitis Father    • Alzheimer's disease Mother    • No Known Problems Sister      Social History:   Social History     Tobacco Use   • Smoking status: Former Smoker     Packs/day: 1.50     Years: 18.00     Pack years: 27.00     " Types: Cigarettes     Last attempt to quit: 2000     Years since quittin.1   • Smokeless tobacco: Former User   • Tobacco comment: quit    Substance Use Topics   • Alcohol use: No   • Drug use: No       Vital Signs Range for the last 24 hours  Temperature:     Temp Source:     BP: BP: ()/()    Pulse:     Respirations:     SPO2:     O2 Amount (l/min):     O2 Devices     Weight:       Flowsheet Rows         First Filed Value    Admission Height  69\" (175.3 cm) Documented at 2017 1000    Admission Weight            --------------------------------------------------------------------------------    Current Outpatient Medications   Medication Sig Dispense Refill   • albuterol (PROVENTIL HFA;VENTOLIN HFA) 108 (90 Base) MCG/ACT inhaler Inhale 2 puffs Every 4 (Four) Hours As Needed for Wheezing. 6.7 g 0   • amLODIPine (NORVASC) 10 MG tablet Take 1 tablet by mouth Daily. 90 tablet 3   • aspirin 81 MG EC tablet Take 81 mg by mouth Daily.     • Blood Gluc Meter Disp-Strips device Pt to monitor blood glucose two times daily 1 each 0   • buPROPion XL (WELLBUTRIN XL) 300 MG 24 hr tablet TAKE ONE TABLET BY MOUTH DAILY 90 tablet 2   • Cholecalciferol (VITAMIN D-3) 1000 UNITS capsule Take 1,000 Units by mouth Daily.     • fluticasone (FLONASE) 50 MCG/ACT nasal spray 2 sprays into each nostril Daily. 15.8 mL 0   • Iron, Ferrous Gluconate, 256 (28 FE) MG tablet Take 1 tablet by mouth daily.     • lisinopril (PRINIVIL,ZESTRIL) 10 MG tablet TAKE ONE TABLET BY MOUTH DAILY 90 tablet 2   • omeprazole (priLOSEC) 40 MG capsule Take 1 capsule by mouth Daily. 90 capsule 3   • pioglitazone (ACTOS) 45 MG tablet TAKE ONE TABLET BY MOUTH DAILY 90 tablet 2   • simvastatin (ZOCOR) 20 MG tablet Take 1 tablet by mouth Every Night. 90 tablet 3   • vitamin B-12 (CYANOCOBALAMIN) 1000 MCG tablet Take 1,000 mcg by mouth Daily.     • vitamin C (ASCORBIC ACID) 500 MG tablet Take 500 mg by mouth Daily.       No current facility-administered " medications for this encounter.        --------------------------------------------------------------------------------  Assessment/Plan      Anesthesia Evaluation     Patient summary reviewed and Nursing notes reviewed   no history of anesthetic complications:  NPO Solid Status: > 6 hours  NPO Liquid Status: > 2 hours    Pain impairs ability to perform ADLs: Sleeping  Modalities previously tried to control pain with limited effectiveness within the last 4-6 weeks: Rest     Airway   Mallampati: II  TM distance: >3 FB  Neck ROM: full  Dental - normal exam     Pulmonary - negative pulmonary ROS and normal exam   Cardiovascular - normal exam    (+) hypertension, CAD, hyperlipidemia,       Neuro/Psych- neuro exam normal  (+) psychiatric history,     GI/Hepatic/Renal/Endo    (+)  GERD, GI bleeding, diabetes mellitus,     Musculoskeletal     (+) back pain, radiculopathy Left lower extremity and Right lower extremity  Abdominal  - normal exam   Substance History      OB/GYN          Other   (+) arthritis                Diagnosis and Plan      Treatment Plan  ASA 3   Patient has had previous injection/procedure with 50-75% improvement.   Procedures: Lumbar Epidural Steroid Injection(LESI), With fluoroscopy,       Anesthetic plan and risks discussed with patient.          Diagnosis     * Lumbar degenerative disc disease [M51.36]     * Lumbar radiculopathy [M54.16]

## 2019-03-20 DIAGNOSIS — E78.01 FAMILIAL HYPERCHOLESTEROLEMIA: ICD-10-CM

## 2019-03-21 RX ORDER — SIMVASTATIN 20 MG
TABLET ORAL
Qty: 90 TABLET | Refills: 2 | Status: SHIPPED | OUTPATIENT
Start: 2019-03-21 | End: 2019-10-07 | Stop reason: SDUPTHER

## 2019-04-04 ENCOUNTER — OFFICE VISIT (OUTPATIENT)
Dept: FAMILY MEDICINE CLINIC | Facility: CLINIC | Age: 74
End: 2019-04-04

## 2019-04-04 VITALS
TEMPERATURE: 102 F | HEIGHT: 69 IN | SYSTOLIC BLOOD PRESSURE: 130 MMHG | DIASTOLIC BLOOD PRESSURE: 72 MMHG | WEIGHT: 231 LBS | OXYGEN SATURATION: 100 % | RESPIRATION RATE: 16 BRPM | HEART RATE: 66 BPM | BODY MASS INDEX: 34.21 KG/M2

## 2019-04-04 DIAGNOSIS — K21.9 GASTROESOPHAGEAL REFLUX DISEASE, ESOPHAGITIS PRESENCE NOT SPECIFIED: ICD-10-CM

## 2019-04-04 DIAGNOSIS — R50.9 FEVER, UNSPECIFIED FEVER CAUSE: ICD-10-CM

## 2019-04-04 DIAGNOSIS — E78.01 FAMILIAL HYPERCHOLESTEROLEMIA: ICD-10-CM

## 2019-04-04 DIAGNOSIS — N18.30 CHRONIC KIDNEY DISEASE, STAGE III (MODERATE) (HCC): ICD-10-CM

## 2019-04-04 DIAGNOSIS — J11.1 FLU: ICD-10-CM

## 2019-04-04 DIAGNOSIS — I10 ESSENTIAL HYPERTENSION: ICD-10-CM

## 2019-04-04 DIAGNOSIS — E11.9 TYPE 2 DIABETES MELLITUS WITHOUT COMPLICATION, WITHOUT LONG-TERM CURRENT USE OF INSULIN (HCC): Primary | ICD-10-CM

## 2019-04-04 LAB
EXPIRATION DATE: ABNORMAL
FLUAV AG NPH QL: POSITIVE
FLUBV AG NPH QL: NEGATIVE
HBA1C MFR BLD: 6.3 %
INTERNAL CONTROL: ABNORMAL
Lab: ABNORMAL

## 2019-04-04 PROCEDURE — 83036 HEMOGLOBIN GLYCOSYLATED A1C: CPT | Performed by: NURSE PRACTITIONER

## 2019-04-04 PROCEDURE — 99214 OFFICE O/P EST MOD 30 MIN: CPT | Performed by: NURSE PRACTITIONER

## 2019-04-04 PROCEDURE — 87804 INFLUENZA ASSAY W/OPTIC: CPT | Performed by: NURSE PRACTITIONER

## 2019-04-04 RX ORDER — OSELTAMIVIR PHOSPHATE 75 MG/1
75 CAPSULE ORAL 2 TIMES DAILY
Qty: 10 CAPSULE | Refills: 0 | Status: SHIPPED | OUTPATIENT
Start: 2019-04-04 | End: 2019-05-02

## 2019-04-04 NOTE — PROGRESS NOTES
"Subjective   Danyel Dash is a 73 y.o. male.     History of Present Illness   Danyel Dash 73 y.o. male who presents today for routine follow up check and medication refills.  he has a history of   Patient Active Problem List   Diagnosis   • Anemia   • Anxiety   • Chronic kidney disease, stage III (moderate) (CMS/[facility])   • Diabetes mellitus (CMS/[facility])   • Erectile dysfunction of nonorganic origin   • Gastroesophageal reflux disease   • Hyperlipidemia   • Essential hypertension   • Iron deficiency anemia   • Special screening for malignant neoplasm of prostate   • Fever   • Flu   .  Since the last visit, he has overall felt well.  He has Hypertenision and is well controlled on medication, DMII and is well controlled on medication, GERD and is well controlled on PPI medication and Hyperlipidemia and is well controlled on medication.  he has been compliant with current medications have reviewed them.  The patient denies medication side effects.    Results for orders placed or performed in visit on 04/04/19   POC Glycosylated Hemoglobin (Hb A1C)   Result Value Ref Range    Hemoglobin A1C 6.3 %       Upper Respiratory Infection: Patient complains of symptoms of a URI. Symptoms include congestion, cough and fever. Onset of symptoms was 4 days ago, unchanged since that time. He also c/o achiness for the past 4 days .  He is drinking plenty of fluids. Evaluation to date: none. Treatment to date: none.        The following portions of the patient's history were reviewed and updated as appropriate: allergies, current medications, past social history and problem list.    Review of Systems   Constitutional: Positive for fatigue and fever.   Respiratory: Positive for cough.    All other systems reviewed and are negative.      Objective   /72   Pulse 66   Temp (!) 102 °F (38.9 °C) (Oral)   Resp 16   Ht 175.3 cm (69\")   Wt 105 kg (231 lb)   SpO2 100%   BMI 34.11 kg/m²   Physical Exam   Constitutional: He is " oriented to person, place, and time. Vital signs are normal. He appears well-developed and well-nourished. No distress.   HENT:   Head: Normocephalic.   Cardiovascular: Normal rate, regular rhythm and normal heart sounds.   Pulmonary/Chest: Effort normal and breath sounds normal.   Neurological: He is alert and oriented to person, place, and time. Gait normal.   Psychiatric: He has a normal mood and affect. His behavior is normal. Judgment and thought content normal.   Vitals reviewed.      Assessment/Plan      Diagnosis Plan   1. Type 2 diabetes mellitus without complication, without long-term current use of insulin (CMS/[facility])  POC Glycosylated Hemoglobin (Hb A1C)   2. Gastroesophageal reflux disease, esophagitis presence not specified     3. Familial hypercholesterolemia     4. Essential hypertension     5. Fever, unspecified fever cause  POC Influenza A / B   6. Chronic kidney disease, stage III (moderate) (CMS/[facility])  Comprehensive Metabolic Panel   7. Flu  oseltamivir (TAMIFLU) 75 MG capsule     Cont same with meds  Follow up after labs   JOHANA Reza  4/4/2019

## 2019-04-05 LAB
ALBUMIN SERPL-MCNC: 4.3 G/DL (ref 3.5–5.2)
ALBUMIN/GLOB SERPL: 2 G/DL
ALP SERPL-CCNC: 41 U/L (ref 39–117)
ALT SERPL-CCNC: 14 U/L (ref 1–41)
AST SERPL-CCNC: 18 U/L (ref 1–40)
BILIRUB SERPL-MCNC: 0.6 MG/DL (ref 0.2–1.2)
BUN SERPL-MCNC: 21 MG/DL (ref 8–23)
BUN/CREAT SERPL: 14.9 (ref 7–25)
CALCIUM SERPL-MCNC: 9 MG/DL (ref 8.6–10.5)
CHLORIDE SERPL-SCNC: 100 MMOL/L (ref 98–107)
CO2 SERPL-SCNC: 22.8 MMOL/L (ref 22–29)
CREAT SERPL-MCNC: 1.41 MG/DL (ref 0.76–1.27)
GLOBULIN SER CALC-MCNC: 2.1 GM/DL
GLUCOSE SERPL-MCNC: 165 MG/DL (ref 65–99)
POTASSIUM SERPL-SCNC: 4.3 MMOL/L (ref 3.5–5.2)
PROT SERPL-MCNC: 6.4 G/DL (ref 6–8.5)
SODIUM SERPL-SCNC: 136 MMOL/L (ref 136–145)

## 2019-04-23 ENCOUNTER — TELEPHONE (OUTPATIENT)
Dept: ORTHOPEDIC SURGERY | Facility: CLINIC | Age: 74
End: 2019-04-23

## 2019-04-23 DIAGNOSIS — M54.50 LUMBAR SPINE PAIN: ICD-10-CM

## 2019-04-23 DIAGNOSIS — M48.061 SPINAL STENOSIS OF LUMBAR REGION, UNSPECIFIED WHETHER NEUROGENIC CLAUDICATION PRESENT: ICD-10-CM

## 2019-04-23 DIAGNOSIS — M48.062 SPINAL STENOSIS OF LUMBAR REGION WITH NEUROGENIC CLAUDICATION: Primary | ICD-10-CM

## 2019-04-23 NOTE — TELEPHONE ENCOUNTER
PT CALLED AND WOULD LIKE ORDER PUT IN FOR LUMBAR EPIDURAL WHICH WOULD BE HIS 3RD, COULD YOU PLEASE PUT IN/DM

## 2019-05-02 ENCOUNTER — ANESTHESIA EVENT (OUTPATIENT)
Dept: PAIN MEDICINE | Facility: HOSPITAL | Age: 74
End: 2019-05-02

## 2019-05-02 ENCOUNTER — HOSPITAL ENCOUNTER (OUTPATIENT)
Dept: PAIN MEDICINE | Facility: HOSPITAL | Age: 74
Discharge: HOME | End: 2019-05-02
Admitting: ANESTHESIOLOGY

## 2019-05-02 ENCOUNTER — ANESTHESIA (OUTPATIENT)
Dept: PAIN MEDICINE | Facility: HOSPITAL | Age: 74
End: 2019-05-02

## 2019-05-02 ENCOUNTER — HOSPITAL ENCOUNTER (OUTPATIENT)
Dept: GENERAL RADIOLOGY | Facility: HOSPITAL | Age: 74
Discharge: HOME | End: 2019-05-02

## 2019-05-02 VITALS
DIASTOLIC BLOOD PRESSURE: 76 MMHG | RESPIRATION RATE: 16 BRPM | SYSTOLIC BLOOD PRESSURE: 132 MMHG | WEIGHT: 228 LBS | BODY MASS INDEX: 33.77 KG/M2 | TEMPERATURE: 98.4 F | HEART RATE: 69 BPM | OXYGEN SATURATION: 94 % | HEIGHT: 69 IN

## 2019-05-02 DIAGNOSIS — R52 PAIN: ICD-10-CM

## 2019-05-02 DIAGNOSIS — M48.062 SPINAL STENOSIS OF LUMBAR REGION WITH NEUROGENIC CLAUDICATION: ICD-10-CM

## 2019-05-02 DIAGNOSIS — M54.50 LUMBAR SPINE PAIN: ICD-10-CM

## 2019-05-02 DIAGNOSIS — M48.061 SPINAL STENOSIS OF LUMBAR REGION, UNSPECIFIED WHETHER NEUROGENIC CLAUDICATION PRESENT: Primary | ICD-10-CM

## 2019-05-02 PROCEDURE — C1755 CATHETER, INTRASPINAL: HCPCS

## 2019-05-02 PROCEDURE — 25010000002 METHYLPREDNISOLONE PER 80 MG: Performed by: ANESTHESIOLOGY

## 2019-05-02 PROCEDURE — 77003 FLUOROGUIDE FOR SPINE INJECT: CPT

## 2019-05-02 RX ORDER — METHYLPREDNISOLONE ACETATE 80 MG/ML
80 INJECTION, SUSPENSION INTRA-ARTICULAR; INTRALESIONAL; INTRAMUSCULAR; SOFT TISSUE ONCE
Status: COMPLETED | OUTPATIENT
Start: 2019-05-02 | End: 2019-05-02

## 2019-05-02 RX ORDER — MIDAZOLAM HYDROCHLORIDE 1 MG/ML
1 INJECTION INTRAMUSCULAR; INTRAVENOUS
Status: DISCONTINUED | OUTPATIENT
Start: 2019-05-02 | End: 2019-05-03 | Stop reason: HOSPADM

## 2019-05-02 RX ORDER — FENTANYL CITRATE 50 UG/ML
50 INJECTION, SOLUTION INTRAMUSCULAR; INTRAVENOUS AS NEEDED
Status: DISCONTINUED | OUTPATIENT
Start: 2019-05-02 | End: 2019-05-03 | Stop reason: HOSPADM

## 2019-05-02 RX ORDER — LIDOCAINE HYDROCHLORIDE 10 MG/ML
1 INJECTION, SOLUTION INFILTRATION; PERINEURAL ONCE
Status: DISCONTINUED | OUTPATIENT
Start: 2019-05-02 | End: 2019-05-03 | Stop reason: HOSPADM

## 2019-05-02 RX ADMIN — METHYLPREDNISOLONE ACETATE 80 MG: 80 INJECTION, SUSPENSION INTRA-ARTICULAR; INTRALESIONAL; INTRAMUSCULAR; SOFT TISSUE at 09:25

## 2019-05-02 NOTE — H&P
[facility]    History and Physical    Patient Name: Danyel Dash  :  1945  MRN:  [id number]  Date of Admission: 2019    Subjective     Patient is a 73 y.o. male presents with chief complaint of chronic, intermitent, moderate, severe low back and hips: bilateral pain.  Onset of symptoms was gradual starting several months ago.  Symptoms are associated/aggravated by lifting, standing, straining or twisting. Symptoms improve with relaxation    The following portions of the patients history were reviewed and updated as appropriate: current medications, allergies, past medical history, past surgical history, past family history, past social history and problem list                Objective     Past Medical History:   Past Medical History:   Diagnosis Date   • Anemia    • Arthritis    • Chronic kidney disease    • Coronary artery disease     rheumatic fever at age 7   • Diabetes mellitus (CMS/HCC)    • GERD (gastroesophageal reflux disease)    • GI bleed    • Hyperlipidemia    • Hypertension    • Low back pain    • Rheumatic fever      Past Surgical History:   Past Surgical History:   Procedure Laterality Date   • CATARACT EXTRACTION Bilateral    • COLONOSCOPY N/A 2016    tics, IH, polyps   • ENDOSCOPY N/A 2016    LA Grade B reflux esophagitis, erythematous mucosa in stomach   • ENDOSCOPY N/A 2017    Two jejunal AVM's    • ENTEROSCOPY SMALL BOWEL  2017    Procedure: ENTEROSCOPY SMALL BOWEL;  Surgeon: Anil Prakash MD;  Location: [facility] ENDOSCOPY;  Service:    • EPIDURAL BLOCK     • KNEE ARTHROSCOPY Right    • SHOULDER ARTHROSCOPY Right    • TONSILLECTOMY       Family History:   Family History   Problem Relation Age of Onset   • Diverticulosis Father    • Diverticulitis Father    • Alzheimer's disease Mother    • No Known Problems Sister      Social History:   Social History     Tobacco Use   • Smoking status: Former Smoker     Packs/day: 1.50     Years: 18.00     Pack years: 27.00     " Types: Cigarettes     Last attempt to quit: 2000     Years since quittin.3   • Smokeless tobacco: Former User   • Tobacco comment: quit    Substance Use Topics   • Alcohol use: No   • Drug use: No       Vital Signs Range for the last 24 hours  Temperature:     Temp Source:     BP: BP: ()/()    Pulse:     Respirations:     SPO2:     O2 Amount (l/min):     O2 Devices     Weight:       Flowsheet Rows         First Filed Value    Admission Height  69\" (175.3 cm) Documented at 2017 1000    Admission Weight            --------------------------------------------------------------------------------    Current Outpatient Medications   Medication Sig Dispense Refill   • albuterol (PROVENTIL HFA;VENTOLIN HFA) 108 (90 Base) MCG/ACT inhaler Inhale 2 puffs Every 4 (Four) Hours As Needed for Wheezing. 6.7 g 0   • amLODIPine (NORVASC) 10 MG tablet Take 1 tablet by mouth Daily. 90 tablet 3   • aspirin 81 MG EC tablet Take 81 mg by mouth Daily.     • Blood Gluc Meter Disp-Strips device Pt to monitor blood glucose two times daily 1 each 0   • buPROPion XL (WELLBUTRIN XL) 300 MG 24 hr tablet TAKE ONE TABLET BY MOUTH DAILY 90 tablet 2   • Cholecalciferol (VITAMIN D-3) 1000 UNITS capsule Take 1,000 Units by mouth Daily.     • fluticasone (FLONASE) 50 MCG/ACT nasal spray 2 sprays into each nostril Daily. 15.8 mL 0   • Iron, Ferrous Gluconate, 256 (28 FE) MG tablet Take 1 tablet by mouth daily.     • lisinopril (PRINIVIL,ZESTRIL) 10 MG tablet TAKE ONE TABLET BY MOUTH DAILY 90 tablet 2   • omeprazole (priLOSEC) 40 MG capsule Take 1 capsule by mouth Daily. 90 capsule 3   • oseltamivir (TAMIFLU) 75 MG capsule Take 1 capsule by mouth 2 (Two) Times a Day. 10 capsule 0   • pioglitazone (ACTOS) 45 MG tablet TAKE ONE TABLET BY MOUTH DAILY 90 tablet 2   • simvastatin (ZOCOR) 20 MG tablet TAKE ONE TABLET BY MOUTH DAILY 90 tablet 2   • vitamin B-12 (CYANOCOBALAMIN) 1000 MCG tablet Take 1,000 mcg by mouth Daily.     • vitamin C " (ASCORBIC ACID) 500 MG tablet Take 500 mg by mouth Daily.       No current facility-administered medications for this encounter.        --------------------------------------------------------------------------------  Assessment/Plan      Anesthesia Evaluation     Patient summary reviewed and Nursing notes reviewed   no history of anesthetic complications:  NPO Solid Status: > 6 hours  NPO Liquid Status: > 2 hours    Pain impairs ability to perform ADLs: Sleeping  Modalities previously tried to control pain with limited effectiveness within the last 4-6 weeks: Rest     Airway   Mallampati: II  TM distance: >3 FB  Neck ROM: full  Dental - normal exam     Pulmonary - negative pulmonary ROS and normal exam   Cardiovascular - normal exam    (+) hypertension, CAD, hyperlipidemia,       Neuro/Psych- neuro exam normal  (+) psychiatric history Anxiety,     GI/Hepatic/Renal/Endo    (+)  GERD, GI bleeding, renal disease CRI, diabetes mellitus,     Musculoskeletal     (+) back pain, radiculopathy  Abdominal  - normal exam   Substance History - negative use     OB/GYN negative ob/gyn ROS         Other   (+) arthritis                Diagnosis and Plan      Treatment Plan  ASA 3   Patient has had previous injection/procedure with 25-50% improvement.   Procedures: Lumbar Epidural Steroid Injection(LESI), With fluoroscopy,       Anesthetic plan and risks discussed with patient.          Diagnosis     * Lumbar radiculopathy [M54.16]     * Lumbar degenerative disc disease [M51.36]

## 2019-05-02 NOTE — ANESTHESIA PROCEDURE NOTES
PAIN Epidural block      Patient reassessed immediately prior to procedure    Patient location during procedure: pain clinic  Indication:procedure for pain  Performed By  Anesthesiologist: Ike Montenegro MD  Preanesthetic Checklist  Completed: patient identified, site marked, surgical consent, pre-op evaluation, timeout performed, risks and benefits discussed and monitors and equipment checked  Additional Notes  Depomedrol - 80mg    Needle position confirmed by fluoroscopy. No contrast due to renal function.    Diagnosis  Post-Op Diagnosis Codes:     * Lumbar radiculopathy (M54.16)     * Lumbar degenerative disc disease (M51.36)    Prep:  Pt Position:prone  Sterile Tech:cap, gloves, mask and sterile barrier  Prep:chlorhexidine gluconate and isopropyl alcohol  Monitoring:blood pressure monitoring, continuous pulse oximetry and EKG  Procedure:Sedation: no     Approach:right paramedian  Guidance: fluoroscopy  Location:lumbar  Level:4-5  Needle Type:Tuohy  Needle Gauge:20  Aspiration:negative  Medications:  Depomedrol:80 mg  Preservative Free Saline:2mL  Isovue:0mL    Post Assessment:  Post-procedure: bandaide.  Pt Tolerance:patient tolerated the procedure well with no apparent complications  Complications:no

## 2019-06-06 ENCOUNTER — OFFICE VISIT (OUTPATIENT)
Dept: GASTROENTEROLOGY | Facility: CLINIC | Age: 74
End: 2019-06-06

## 2019-06-06 VITALS
SYSTOLIC BLOOD PRESSURE: 138 MMHG | WEIGHT: 230.6 LBS | HEIGHT: 69 IN | TEMPERATURE: 98.1 F | DIASTOLIC BLOOD PRESSURE: 82 MMHG | BODY MASS INDEX: 34.16 KG/M2

## 2019-06-06 DIAGNOSIS — K21.9 GASTROESOPHAGEAL REFLUX DISEASE, ESOPHAGITIS PRESENCE NOT SPECIFIED: ICD-10-CM

## 2019-06-06 DIAGNOSIS — K92.2 GASTROINTESTINAL HEMORRHAGE, UNSPECIFIED GASTROINTESTINAL HEMORRHAGE TYPE: ICD-10-CM

## 2019-06-06 DIAGNOSIS — Z87.74 H/O ARTERIOVENOUS MALFORMATION (AVM): ICD-10-CM

## 2019-06-06 DIAGNOSIS — D50.9 IRON DEFICIENCY ANEMIA, UNSPECIFIED IRON DEFICIENCY ANEMIA TYPE: Primary | ICD-10-CM

## 2019-06-06 PROCEDURE — 99213 OFFICE O/P EST LOW 20 MIN: CPT | Performed by: INTERNAL MEDICINE

## 2019-06-06 NOTE — PROGRESS NOTES
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 He is to see Hematology next month. Good energy. Feels good. No SOA. Just went to [place] on motorcycle. No abdominal or chest pain. No recctal bleeding or melena. On iron pills (ferrous gluconate 2/day + vitamin C). NO nausea or vomiting. He takes one baby aspirin/day. Weight stable.     Past Medical History:   Diagnosis Date   • Anemia    • Arthritis    • Chronic kidney disease    • Coronary artery disease     rheumatic fever at age 7   • Diabetes mellitus (CMS/HCC)    • GERD (gastroesophageal reflux disease)    • GI bleed    • Hyperlipidemia    • Hypertension    • Low back pain    • Rheumatic fever        Past Surgical History:   Procedure Laterality Date   • CATARACT EXTRACTION Bilateral    • COLONOSCOPY N/A 12/27/2016    tics, IH, polyps   • ENDOSCOPY N/A 12/27/2016    LA Grade B reflux esophagitis, erythematous mucosa in stomach   • ENDOSCOPY N/A 7/7/2017    Two jejunal AVM's    • ENTEROSCOPY SMALL BOWEL  7/7/2017    Procedure: ENTEROSCOPY SMALL BOWEL;  Surgeon: Anil Prakash MD;  Location: [facility] ENDOSCOPY;  Service:    • EPIDURAL BLOCK     • KNEE ARTHROSCOPY Right    • SHOULDER ARTHROSCOPY Right    • TONSILLECTOMY           Current Outpatient Medications:   •  albuterol (PROVENTIL HFA;VENTOLIN HFA) 108 (90 Base) MCG/ACT inhaler, Inhale 2 puffs Every 4 (Four) Hours As Needed for Wheezing., Disp: 6.7 g, Rfl: 0  •  amLODIPine (NORVASC) 10 MG tablet, Take 1 tablet by mouth Daily., Disp: 90 tablet, Rfl: 3  •  aspirin 81 MG EC tablet, Take 81 mg by mouth Daily., Disp: , Rfl:   •  Blood Gluc Meter Disp-Strips device, Pt to monitor blood glucose two times daily, Disp: 1 each, Rfl: 0  •  buPROPion XL (WELLBUTRIN XL) 300 MG 24 hr tablet, TAKE ONE  TABLET BY MOUTH DAILY, Disp: 90 tablet, Rfl: 2  •  Cholecalciferol (VITAMIN D-3) 1000 UNITS capsule, Take 1,000 Units by mouth Daily., Disp: , Rfl:   •  fluticasone (FLONASE) 50 MCG/ACT nasal spray, 2 sprays into each nostril Daily., Disp: 15.8 mL, Rfl: 0  •  Iron, Ferrous Gluconate, 256 (28 FE) MG tablet, Take 1 tablet by mouth daily., Disp: , Rfl:   •  lisinopril (PRINIVIL,ZESTRIL) 10 MG tablet, TAKE ONE TABLET BY MOUTH DAILY, Disp: 90 tablet, Rfl: 2  •  omeprazole (priLOSEC) 40 MG capsule, Take 1 capsule by mouth Daily., Disp: 90 capsule, Rfl: 3  •  pioglitazone (ACTOS) 45 MG tablet, TAKE ONE TABLET BY MOUTH DAILY, Disp: 90 tablet, Rfl: 2  •  simvastatin (ZOCOR) 20 MG tablet, TAKE ONE TABLET BY MOUTH DAILY, Disp: 90 tablet, Rfl: 2  •  vitamin B-12 (CYANOCOBALAMIN) 1000 MCG tablet, Take 1,000 mcg by mouth Daily., Disp: , Rfl:   •  vitamin C (ASCORBIC ACID) 500 MG tablet, Take 500 mg by mouth Daily., Disp: , Rfl:     No Known Allergies    Family History   Problem Relation Age of Onset   • Diverticulosis Father    • Diverticulitis Father    • Alzheimer's disease Mother    • No Known Problems Sister        Social History     Socioeconomic History   • Marital status:      Spouse name: Lea   • Number of children: 3   • Years of education: Not on file   • Highest education level: Not on file   Occupational History   • Occupation:      Employer: RETIRED   Tobacco Use   • Smoking status: Former Smoker     Packs/day: 1.50     Years: 18.00     Pack years: 27.00     Types: Cigarettes     Last attempt to quit: 2000     Years since quittin.4   • Smokeless tobacco: Former User   • Tobacco comment: quit    Substance and Sexual Activity   • Alcohol use: No   • Drug use: No   • Sexual activity: Defer       Review of Systems   All other systems reviewed and are negative.      Vitals:    19 0934   BP: 138/82   Temp: 98.1 °F (36.7 °C)       Physical Exam   Constitutional: He is oriented to  person, place, and time. He appears well-developed and well-nourished. No distress.   HENT:   Head: Normocephalic and atraumatic. Hair is normal.   Right Ear: Hearing, tympanic membrane, external ear and ear canal normal.   Left Ear: Hearing, tympanic membrane, external ear and ear canal normal.   Nose: No sinus tenderness or nasal deformity.   Mouth/Throat: Uvula is midline, oropharynx is clear and moist and mucous membranes are normal. No oral lesions. No uvula swelling.   Eyes: Conjunctivae, EOM and lids are normal. Pupils are equal, round, and reactive to light. Right eye exhibits no discharge. Left eye exhibits no discharge. No scleral icterus. Right eye exhibits normal extraocular motion and no nystagmus. Left eye exhibits normal extraocular motion and no nystagmus.   Neck: Normal range of motion. Neck supple. No JVD present. No thyromegaly present.   Cardiovascular: Normal rate, regular rhythm, normal heart sounds, intact distal pulses and normal pulses. Exam reveals no gallop.   No murmur heard.  Pulmonary/Chest: Effort normal and breath sounds normal. No respiratory distress. He has no wheezes. He has no rales. He exhibits no tenderness.   Abdominal: Soft. Bowel sounds are normal. He exhibits no distension and no mass. There is no tenderness. There is no guarding. No hernia.   Genitourinary: Rectal exam shows guaiac negative stool.   Musculoskeletal: Normal range of motion. He exhibits no edema, tenderness or deformity.   Lymphadenopathy:     He has no cervical adenopathy.   Neurological: He is alert and oriented to person, place, and time. He has normal reflexes. He displays normal reflexes. No cranial nerve deficit. He exhibits normal muscle tone. Coordination normal.   Skin: Skin is warm and dry. No rash noted. He is not diaphoretic.   Psychiatric: He has a normal mood and affect. His behavior is normal. Judgment and thought content normal.   Vitals reviewed.      Danyel was seen today for anemia follow  up.    Diagnoses and all orders for this visit:    Iron deficiency anemia, unspecified iron deficiency anemia type  -     CBC & Differential  -     Comprehensive Metabolic Panel  -     Ferritin  -     Folate RBC  -     Iron Profile  -     Vitamin B12    Gastrointestinal hemorrhage, unspecified gastrointestinal hemorrhage type  -     CBC & Differential  -     Comprehensive Metabolic Panel  -     Ferritin  -     Folate RBC  -     Iron Profile  -     Vitamin B12    Gastroesophageal reflux disease, esophagitis presence not specified    H/O arteriovenous malformation (AVM)       Assessment:  1.  History of gastroesophageal reflux disease.  2.  History of iron deficiency anemia. He is heme negative today.   3.  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  4. H/o colon polyps (HP).    Recommendations:  1. Labs:   2. F/u one year.       Return in about 1 year (around 6/6/2020).    Anil Prakash MD  6/6/2019

## 2019-06-10 ENCOUNTER — APPOINTMENT (OUTPATIENT)
Dept: LAB | Facility: HOSPITAL | Age: 74
End: 2019-06-10

## 2019-06-10 LAB
ALBUMIN SERPL-MCNC: 4.1 G/DL (ref 3.5–5.2)
ALBUMIN/GLOB SERPL: 1.6 G/DL
ALP SERPL-CCNC: 49 U/L (ref 39–117)
ALT SERPL W P-5'-P-CCNC: 14 U/L (ref 1–41)
ANION GAP SERPL CALCULATED.3IONS-SCNC: 11 MMOL/L
AST SERPL-CCNC: 12 U/L (ref 1–40)
BASOPHILS # BLD AUTO: 0.06 10*3/MM3 (ref 0–0.2)
BASOPHILS NFR BLD AUTO: 0.9 % (ref 0–1.5)
BILIRUB SERPL-MCNC: 0.6 MG/DL (ref 0.2–1.2)
BUN BLD-MCNC: 16 MG/DL (ref 8–23)
BUN/CREAT SERPL: 15.8 (ref 7–25)
CALCIUM SPEC-SCNC: 9.2 MG/DL (ref 8.6–10.5)
CHLORIDE SERPL-SCNC: 104 MMOL/L (ref 98–107)
CO2 SERPL-SCNC: 27 MMOL/L (ref 22–29)
CREAT BLD-MCNC: 1.01 MG/DL (ref 0.76–1.27)
DEPRECATED RDW RBC AUTO: 46 FL (ref 37–54)
EOSINOPHIL # BLD AUTO: 0.14 10*3/MM3 (ref 0–0.4)
EOSINOPHIL NFR BLD AUTO: 2.2 % (ref 0.3–6.2)
ERYTHROCYTE [DISTWIDTH] IN BLOOD BY AUTOMATED COUNT: 13.4 % (ref 12.3–15.4)
FERRITIN SERPL-MCNC: 60.7 NG/ML (ref 30–400)
GFR SERPL CREATININE-BSD FRML MDRD: 73 ML/MIN/1.73
GLOBULIN UR ELPH-MCNC: 2.6 GM/DL
GLUCOSE BLD-MCNC: 121 MG/DL (ref 65–99)
HCT VFR BLD AUTO: 43 % (ref 37.5–51)
HGB BLD-MCNC: 13.6 G/DL (ref 13–17.7)
IMM GRANULOCYTES # BLD AUTO: 0.04 10*3/MM3 (ref 0–0.05)
IMM GRANULOCYTES NFR BLD AUTO: 0.6 % (ref 0–0.5)
IRON 24H UR-MRATE: 175 MCG/DL (ref 59–158)
IRON SATN MFR SERPL: 47 % (ref 20–50)
LYMPHOCYTES # BLD AUTO: 0.9 10*3/MM3 (ref 0.7–3.1)
LYMPHOCYTES NFR BLD AUTO: 14.2 % (ref 19.6–45.3)
MCH RBC QN AUTO: 29.8 PG (ref 26.6–33)
MCHC RBC AUTO-ENTMCNC: 31.6 G/DL (ref 31.5–35.7)
MCV RBC AUTO: 94.3 FL (ref 79–97)
MONOCYTES # BLD AUTO: 0.38 10*3/MM3 (ref 0.1–0.9)
MONOCYTES NFR BLD AUTO: 6 % (ref 5–12)
NEUTROPHILS # BLD AUTO: 4.8 10*3/MM3 (ref 1.7–7)
NEUTROPHILS NFR BLD AUTO: 76.1 % (ref 42.7–76)
NRBC BLD AUTO-RTO: 0 /100 WBC (ref 0–0.2)
PLATELET # BLD AUTO: 317 10*3/MM3 (ref 140–450)
PMV BLD AUTO: 9.4 FL (ref 6–12)
POTASSIUM BLD-SCNC: 4.9 MMOL/L (ref 3.5–5.2)
PROT SERPL-MCNC: 6.7 G/DL (ref 6–8.5)
RBC # BLD AUTO: 4.56 10*6/MM3 (ref 4.14–5.8)
SODIUM BLD-SCNC: 142 MMOL/L (ref 136–145)
TIBC SERPL-MCNC: 375 MCG/DL (ref 298–536)
TRANSFERRIN SERPL-MCNC: 252 MG/DL (ref 200–360)
VIT B12 BLD-MCNC: 1117 PG/ML (ref 211–946)
WBC NRBC COR # BLD: 6.32 10*3/MM3 (ref 3.4–10.8)

## 2019-06-10 PROCEDURE — 82747 ASSAY OF FOLIC ACID RBC: CPT | Performed by: INTERNAL MEDICINE

## 2019-06-10 PROCEDURE — 80053 COMPREHEN METABOLIC PANEL: CPT | Performed by: INTERNAL MEDICINE

## 2019-06-10 PROCEDURE — 85014 HEMATOCRIT: CPT | Performed by: INTERNAL MEDICINE

## 2019-06-10 PROCEDURE — 36415 COLL VENOUS BLD VENIPUNCTURE: CPT | Performed by: INTERNAL MEDICINE

## 2019-06-10 PROCEDURE — 84466 ASSAY OF TRANSFERRIN: CPT | Performed by: INTERNAL MEDICINE

## 2019-06-10 PROCEDURE — 82607 VITAMIN B-12: CPT | Performed by: INTERNAL MEDICINE

## 2019-06-10 PROCEDURE — 82728 ASSAY OF FERRITIN: CPT | Performed by: INTERNAL MEDICINE

## 2019-06-10 PROCEDURE — 85025 COMPLETE CBC W/AUTO DIFF WBC: CPT | Performed by: INTERNAL MEDICINE

## 2019-06-10 PROCEDURE — 83540 ASSAY OF IRON: CPT | Performed by: INTERNAL MEDICINE

## 2019-06-11 LAB
FOLATE BLD-MCNC: 369.4 NG/ML
FOLATE RBC-MCNC: 869 NG/ML
HCT VFR BLD AUTO: 42.5 % (ref 37.5–51)

## 2019-06-12 ENCOUNTER — TELEPHONE (OUTPATIENT)
Dept: GASTROENTEROLOGY | Facility: CLINIC | Age: 74
End: 2019-06-12

## 2019-06-19 DIAGNOSIS — F41.9 ANXIETY: ICD-10-CM

## 2019-06-19 RX ORDER — BUPROPION HYDROCHLORIDE 300 MG/1
TABLET ORAL
Qty: 90 TABLET | Refills: 1 | Status: SHIPPED | OUTPATIENT
Start: 2019-06-19 | End: 2019-10-07 | Stop reason: SDUPTHER

## 2019-07-22 ENCOUNTER — APPOINTMENT (OUTPATIENT)
Dept: LAB | Facility: HOSPITAL | Age: 74
End: 2019-07-22

## 2019-07-22 ENCOUNTER — APPOINTMENT (OUTPATIENT)
Dept: ONCOLOGY | Facility: CLINIC | Age: 74
End: 2019-07-22

## 2019-08-02 ENCOUNTER — LAB (OUTPATIENT)
Dept: LAB | Facility: HOSPITAL | Age: 74
End: 2019-08-02

## 2019-08-02 ENCOUNTER — TRANSCRIBE ORDERS (OUTPATIENT)
Dept: ADMINISTRATIVE | Facility: HOSPITAL | Age: 74
End: 2019-08-02

## 2019-08-02 DIAGNOSIS — N18.2 CHRONIC KIDNEY DISEASE, STAGE II (MILD): Primary | ICD-10-CM

## 2019-08-02 DIAGNOSIS — E55.9 VITAMIN D DEFICIENCY: ICD-10-CM

## 2019-08-02 DIAGNOSIS — N18.2 CHRONIC KIDNEY DISEASE, STAGE II (MILD): ICD-10-CM

## 2019-08-02 LAB
25(OH)D3 SERPL-MCNC: 51.1 NG/ML (ref 30–100)
ANION GAP SERPL CALCULATED.3IONS-SCNC: 10 MMOL/L (ref 5–15)
BUN BLD-MCNC: 16 MG/DL (ref 8–23)
BUN/CREAT SERPL: 13.8 (ref 7–25)
CALCIUM SPEC-SCNC: 9.5 MG/DL (ref 8.6–10.5)
CHLORIDE SERPL-SCNC: 102 MMOL/L (ref 98–107)
CO2 SERPL-SCNC: 25 MMOL/L (ref 22–29)
CREAT BLD-MCNC: 1.16 MG/DL (ref 0.76–1.27)
GFR SERPL CREATININE-BSD FRML MDRD: 62 ML/MIN/1.73
GLUCOSE BLD-MCNC: 156 MG/DL (ref 65–99)
POTASSIUM BLD-SCNC: 4.3 MMOL/L (ref 3.5–5.2)
SODIUM BLD-SCNC: 137 MMOL/L (ref 136–145)

## 2019-08-02 PROCEDURE — 36415 COLL VENOUS BLD VENIPUNCTURE: CPT

## 2019-08-02 PROCEDURE — 80048 BASIC METABOLIC PNL TOTAL CA: CPT

## 2019-08-02 PROCEDURE — 82306 VITAMIN D 25 HYDROXY: CPT

## 2019-08-12 ENCOUNTER — LAB (OUTPATIENT)
Dept: LAB | Facility: HOSPITAL | Age: 74
End: 2019-08-12

## 2019-08-12 ENCOUNTER — OFFICE VISIT (OUTPATIENT)
Dept: ONCOLOGY | Facility: CLINIC | Age: 74
End: 2019-08-12

## 2019-08-12 VITALS
HEIGHT: 69 IN | BODY MASS INDEX: 35.4 KG/M2 | OXYGEN SATURATION: 97 % | HEART RATE: 77 BPM | WEIGHT: 239 LBS | RESPIRATION RATE: 18 BRPM | TEMPERATURE: 98.2 F | DIASTOLIC BLOOD PRESSURE: 69 MMHG | SYSTOLIC BLOOD PRESSURE: 148 MMHG

## 2019-08-12 DIAGNOSIS — D50.0 IRON DEFICIENCY ANEMIA DUE TO CHRONIC BLOOD LOSS: Primary | ICD-10-CM

## 2019-08-12 DIAGNOSIS — D50.8 OTHER IRON DEFICIENCY ANEMIA: Primary | ICD-10-CM

## 2019-08-12 LAB
BASOPHILS # BLD AUTO: 0.03 10*3/MM3 (ref 0–0.2)
BASOPHILS NFR BLD AUTO: 0.4 % (ref 0–1.5)
DEPRECATED RDW RBC AUTO: 45.2 FL (ref 37–54)
EOSINOPHIL # BLD AUTO: 0.08 10*3/MM3 (ref 0–0.4)
EOSINOPHIL NFR BLD AUTO: 1.2 % (ref 0.3–6.2)
ERYTHROCYTE [DISTWIDTH] IN BLOOD BY AUTOMATED COUNT: 13 % (ref 12.3–15.4)
FERRITIN SERPL-MCNC: 41.3 NG/ML (ref 30–400)
HCT VFR BLD AUTO: 42.5 % (ref 37.5–51)
HGB BLD-MCNC: 14 G/DL (ref 13–17.7)
IMM GRANULOCYTES # BLD AUTO: 0.01 10*3/MM3 (ref 0–0.05)
IMM GRANULOCYTES NFR BLD AUTO: 0.1 % (ref 0–0.5)
IRON 24H UR-MRATE: 135 MCG/DL (ref 59–158)
IRON SATN MFR SERPL: 37 % (ref 14–48)
LYMPHOCYTES # BLD AUTO: 1.02 10*3/MM3 (ref 0.7–3.1)
LYMPHOCYTES NFR BLD AUTO: 14.7 % (ref 19.6–45.3)
MCH RBC QN AUTO: 31.4 PG (ref 26.6–33)
MCHC RBC AUTO-ENTMCNC: 32.9 G/DL (ref 31.5–35.7)
MCV RBC AUTO: 95.3 FL (ref 79–97)
MONOCYTES # BLD AUTO: 0.44 10*3/MM3 (ref 0.1–0.9)
MONOCYTES NFR BLD AUTO: 6.3 % (ref 5–12)
NEUTROPHILS # BLD AUTO: 5.36 10*3/MM3 (ref 1.7–7)
NEUTROPHILS NFR BLD AUTO: 77.3 % (ref 42.7–76)
NRBC BLD AUTO-RTO: 0 /100 WBC (ref 0–0.2)
PLATELET # BLD AUTO: 280 10*3/MM3 (ref 140–450)
PMV BLD AUTO: 9 FL (ref 6–12)
RBC # BLD AUTO: 4.46 10*6/MM3 (ref 4.14–5.8)
TIBC SERPL-MCNC: 365 MCG/DL (ref 249–505)
TRANSFERRIN SERPL-MCNC: 261 MG/DL (ref 200–360)
WBC NRBC COR # BLD: 6.94 10*3/MM3 (ref 3.4–10.8)

## 2019-08-12 PROCEDURE — 85025 COMPLETE CBC W/AUTO DIFF WBC: CPT | Performed by: INTERNAL MEDICINE

## 2019-08-12 PROCEDURE — 82728 ASSAY OF FERRITIN: CPT | Performed by: INTERNAL MEDICINE

## 2019-08-12 PROCEDURE — 36415 COLL VENOUS BLD VENIPUNCTURE: CPT | Performed by: INTERNAL MEDICINE

## 2019-08-12 PROCEDURE — 84466 ASSAY OF TRANSFERRIN: CPT | Performed by: INTERNAL MEDICINE

## 2019-08-12 PROCEDURE — 83540 ASSAY OF IRON: CPT | Performed by: INTERNAL MEDICINE

## 2019-08-12 PROCEDURE — 99213 OFFICE O/P EST LOW 20 MIN: CPT | Performed by: INTERNAL MEDICINE

## 2019-08-12 NOTE — PROGRESS NOTES
History:     Reason for follow up:   1. 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 He follows with Dr Prakash as well. He had progressive fatigue despite oral iron and thus we arranged for 2 doses of Feraheme which he received in March 2018 with a nice response in his hemoglobin.       He's feeling well today and fatigue is improved. Has dark stools but attributes this to his iron that he's taking daily without GI troubles.  He tells me he had a rectal exam by Dr. Prakash 2 months ago with no evidence of bleeding.  His hemoglobin today is excellent at 14 his iron studies are pending.          I have reviewed, verified and personally updated the past medical, surgical, family and social history.      Past Medical   Past Medical History:   Diagnosis Date   • Anemia    • Arthritis    • Chronic kidney disease    • Coronary artery disease     rheumatic fever at age 7   • Diabetes mellitus (CMS/HCC)    • GERD (gastroesophageal reflux disease)    • GI bleed    • Hyperlipidemia    • Hypertension    • Low back pain    • Rheumatic fever     and   Patient Active Problem List   Diagnosis   • Anemia   • Anxiety   • Chronic kidney disease, stage III (moderate) (CMS/HCC)   • Diabetes mellitus (CMS/HCC)   • Erectile dysfunction of nonorganic origin   • Gastroesophageal reflux disease   • Hyperlipidemia   • Essential hypertension   • Iron deficiency anemia   • Special screening for malignant neoplasm of prostate   • Fever   • Flu     Social History   Social History     Socioeconomic History   • Marital status:      Spouse name: Lea   • Number of children: 3   • Years of education: Not on file   • Highest education level: Not on file   Occupational History   • Occupation:      Employer: RETIRED   Tobacco Use   • Smoking status: Former Smoker     Packs/day: 1.50     Years: 18.00     Pack  years: 27.00     Types: Cigarettes     Last attempt to quit: 2000     Years since quittin.6   • Smokeless tobacco: Former User   • Tobacco comment: quit    Substance and Sexual Activity   • Alcohol use: No   • Drug use: No   • Sexual activity: Defer     Family History  Family History   Problem Relation Age of Onset   • Diverticulosis Father    • Diverticulitis Father    • Alzheimer's disease Mother    • No Known Problems Sister      Allergies  No Known Allergies    Medications: The current medication list was reviewed in the EMR.    Review of Systems  Review of Systems   Constitutional: Negative for activity change, appetite change, chills, diaphoresis, fatigue, fever and unexpected weight change.   HENT: Negative for congestion, hearing loss, nosebleeds, sinus pressure and trouble swallowing.    Respiratory: Negative for cough, chest tightness, shortness of breath and wheezing.    Cardiovascular: Negative for chest pain, palpitations and leg swelling.   Gastrointestinal: Positive for blood in stool. Negative for abdominal pain, constipation, diarrhea, nausea and vomiting.   Musculoskeletal: Positive for arthralgias and back pain. Negative for neck pain.   Hematological: Negative for adenopathy. Does not bruise/bleed easily.       Objective    Objective:     There were no vitals filed for this visit.  Current Status 2019   ECOG score 0     GENERAL:  Well-developed, well-nourished male in no acute distress. Vital signs reviewed.   SKIN:  Warm, dry without rashes, purpura or petechiae.  EYES:  Pupils equal, round and reactive to light.  EOMs intact.  Conjunctivae normal.  HEAD:  Normocephalic.  EARS/NOSE/MOUTH/THROAT:  Hearing intact. Septum midline.  No excoriations or nasal discharge. No stomatitis or ulcers.  Lips normal. Oropharynx without lesions or exudates.  NECK:  Supple with good range of motion; no thyromegaly or masses  RESP:  Lungs clear to percussion and auscultation. Good airflow. Normal  respiratory effort.   CARDIAC:  Regular rate and rhythm without murmurs, rubs or gallops. Normal S1,S2. No edema  GI:  Soft, nontender, normal bowel sounds  MSK:  No clubbing or cyanosis, No joint swelling noted in hands  PSYCHIATRIC:  Normal affect and mood.  Alert and Oriented x 3.      Labs and Imaging  Results for orders placed or performed in visit on 08/02/19   Basic Metabolic Panel   Result Value Ref Range    Glucose 156 (H) 65 - 99 mg/dL    BUN 16 8 - 23 mg/dL    Creatinine 1.16 0.76 - 1.27 mg/dL    Sodium 137 136 - 145 mmol/L    Potassium 4.3 3.5 - 5.2 mmol/L    Chloride 102 98 - 107 mmol/L    CO2 25.0 22.0 - 29.0 mmol/L    Calcium 9.5 8.6 - 10.5 mg/dL    eGFR Non African Amer 62 >60 mL/min/1.73    BUN/Creatinine Ratio 13.8 7.0 - 25.0    Anion Gap 10.0 5.0 - 15.0 mmol/L   Vitamin D 25 Hydroxy   Result Value Ref Range    25 Hydroxy, Vitamin D 51.1 30.0 - 100.0 ng/ml       Assessment/Plan   Assessment/Plan:   1. Iron deficiency anemia secondary to recurrent GI bleeding from AVMs              * Follows with Dr Prakash. Has had cauterization.              * Tolerates oral iron but he's refractory recently   * status post two doses Feraheme 3/2018              * Hemoglobin improved. Follow up iron studies   * Decrease oral iron to bid.    * Repeat labs in 6 months, but if fatigue worsens again, he knows to call and we can arrange for him to come in for cbc, iron studies.     Follow-up in 6 months. I asked the patient to call for any questions, concerns, or new symptoms.

## 2019-09-28 DIAGNOSIS — I10 ESSENTIAL HYPERTENSION: ICD-10-CM

## 2019-09-30 RX ORDER — LISINOPRIL 10 MG/1
TABLET ORAL
Qty: 90 TABLET | Refills: 1 | Status: SHIPPED | OUTPATIENT
Start: 2019-09-30 | End: 2020-03-25

## 2019-10-07 ENCOUNTER — OFFICE VISIT (OUTPATIENT)
Dept: FAMILY MEDICINE CLINIC | Facility: CLINIC | Age: 74
End: 2019-10-07

## 2019-10-07 VITALS
TEMPERATURE: 98.4 F | SYSTOLIC BLOOD PRESSURE: 130 MMHG | HEIGHT: 69 IN | HEART RATE: 61 BPM | OXYGEN SATURATION: 98 % | WEIGHT: 250 LBS | BODY MASS INDEX: 37.03 KG/M2 | DIASTOLIC BLOOD PRESSURE: 70 MMHG

## 2019-10-07 DIAGNOSIS — K21.9 GASTROESOPHAGEAL REFLUX DISEASE, ESOPHAGITIS PRESENCE NOT SPECIFIED: ICD-10-CM

## 2019-10-07 DIAGNOSIS — E78.01 FAMILIAL HYPERCHOLESTEROLEMIA: ICD-10-CM

## 2019-10-07 DIAGNOSIS — I10 ESSENTIAL HYPERTENSION: ICD-10-CM

## 2019-10-07 DIAGNOSIS — F41.9 ANXIETY: ICD-10-CM

## 2019-10-07 DIAGNOSIS — E11.9 TYPE 2 DIABETES MELLITUS WITHOUT COMPLICATION, WITHOUT LONG-TERM CURRENT USE OF INSULIN (HCC): Primary | ICD-10-CM

## 2019-10-07 PROBLEM — J11.1 FLU: Status: RESOLVED | Noted: 2019-04-04 | Resolved: 2019-10-07

## 2019-10-07 PROBLEM — R50.9 FEVER: Status: RESOLVED | Noted: 2019-04-04 | Resolved: 2019-10-07

## 2019-10-07 PROBLEM — Z12.5 SPECIAL SCREENING FOR MALIGNANT NEOPLASM OF PROSTATE: Status: RESOLVED | Noted: 2018-10-04 | Resolved: 2019-10-07

## 2019-10-07 LAB — HBA1C MFR BLD: 6.3 %

## 2019-10-07 PROCEDURE — 83036 HEMOGLOBIN GLYCOSYLATED A1C: CPT | Performed by: NURSE PRACTITIONER

## 2019-10-07 PROCEDURE — 99214 OFFICE O/P EST MOD 30 MIN: CPT | Performed by: NURSE PRACTITIONER

## 2019-10-07 RX ORDER — SIMVASTATIN 20 MG
20 TABLET ORAL DAILY
Qty: 90 TABLET | Refills: 3 | Status: SHIPPED | OUTPATIENT
Start: 2019-10-07 | End: 2020-12-29

## 2019-10-07 RX ORDER — OMEPRAZOLE 40 MG/1
40 CAPSULE, DELAYED RELEASE ORAL DAILY
Qty: 90 CAPSULE | Refills: 3 | Status: SHIPPED | OUTPATIENT
Start: 2019-10-07 | End: 2019-12-26

## 2019-10-07 RX ORDER — AMLODIPINE BESYLATE 10 MG/1
10 TABLET ORAL DAILY
Qty: 90 TABLET | Refills: 3 | Status: SHIPPED | OUTPATIENT
Start: 2019-10-07 | End: 2019-10-12 | Stop reason: SDUPTHER

## 2019-10-07 RX ORDER — PIOGLITAZONEHYDROCHLORIDE 45 MG/1
45 TABLET ORAL DAILY
Qty: 90 TABLET | Refills: 3 | Status: SHIPPED | OUTPATIENT
Start: 2019-10-07 | End: 2020-11-16

## 2019-10-07 RX ORDER — BUPROPION HYDROCHLORIDE 300 MG/1
300 TABLET ORAL DAILY
Qty: 90 TABLET | Refills: 3 | Status: SHIPPED | OUTPATIENT
Start: 2019-10-07 | End: 2020-12-09

## 2019-10-07 NOTE — PROGRESS NOTES
"Subjective   Danyel Dash is a 73 y.o. male.     History of Present Illness    Since the last visit, he has overall felt well.  He has Essential Hypertension and well controlled on current medication, DMII well controlled on medication and will continue regimen, GERD controlled on PPI Rx, Hyperlipidemia with goals met with current Rx and anxiety well controlled .  he has been compliant with current medications have reviewed them.  The patient denies medication side effects.  Will refill medications. /70 (BP Location: Left arm, Patient Position: Sitting)   Pulse 61   Temp 98.4 °F (36.9 °C)   Ht 175.3 cm (69.02\")   Wt 113 kg (250 lb)   SpO2 98%   BMI 36.90 kg/m²     Results for orders placed or performed in visit on 10/07/19   POC Glycosylated Hemoglobin (Hb A1C)   Result Value Ref Range    Hemoglobin A1C 6.3 %         The following portions of the patient's history were reviewed and updated as appropriate: allergies, current medications, past social history and problem list.    Review of Systems   Constitutional: Negative for fever.   Respiratory: Negative for cough and shortness of breath.    Cardiovascular: Negative for chest pain.   Gastrointestinal: Negative for abdominal pain.   Neurological: Negative for dizziness.       Objective   /70 (BP Location: Left arm, Patient Position: Sitting)   Pulse 61   Temp 98.4 °F (36.9 °C)   Ht 175.3 cm (69.02\")   Wt 113 kg (250 lb)   SpO2 98%   BMI 36.90 kg/m²   Physical Exam   Constitutional: He is oriented to person, place, and time. Vital signs are normal. He appears well-developed and well-nourished. No distress.   HENT:   Head: Normocephalic.   Cardiovascular: Normal rate, regular rhythm and normal heart sounds.   Pulmonary/Chest: Effort normal and breath sounds normal.   Neurological: He is alert and oriented to person, place, and time. Gait normal.   Psychiatric: He has a normal mood and affect. His behavior is normal. Judgment and thought " content normal.   Vitals reviewed.      Assessment/Plan      Diagnosis Plan   1. Type 2 diabetes mellitus without complication, without long-term current use of insulin (CMS/[facility])  POC Glycosylated Hemoglobin (Hb A1C)    pioglitazone (ACTOS) 45 MG tablet   2. Familial hypercholesterolemia  simvastatin (ZOCOR) 20 MG tablet   3. Essential hypertension  amLODIPine (NORVASC) 10 MG tablet   4. Gastroesophageal reflux disease, esophagitis presence not specified  omeprazole (priLOSEC) 40 MG capsule   5. Anxiety  buPROPion XL (WELLBUTRIN XL) 300 MG 24 hr tablet     rto in 6 shalom Loya, APRN  10/7/2019

## 2019-10-12 DIAGNOSIS — I10 ESSENTIAL HYPERTENSION: ICD-10-CM

## 2019-10-14 RX ORDER — AMLODIPINE BESYLATE 10 MG/1
TABLET ORAL
Qty: 90 TABLET | Refills: 2 | Status: SHIPPED | OUTPATIENT
Start: 2019-10-14 | End: 2020-04-07 | Stop reason: SDUPTHER

## 2019-12-26 DIAGNOSIS — K21.9 GASTROESOPHAGEAL REFLUX DISEASE, ESOPHAGITIS PRESENCE NOT SPECIFIED: ICD-10-CM

## 2019-12-26 RX ORDER — OMEPRAZOLE 40 MG/1
CAPSULE, DELAYED RELEASE ORAL
Qty: 90 CAPSULE | Refills: 2 | Status: SHIPPED | OUTPATIENT
Start: 2019-12-26 | End: 2020-04-07 | Stop reason: SDUPTHER

## 2020-01-27 ENCOUNTER — APPOINTMENT (OUTPATIENT)
Dept: LAB | Facility: HOSPITAL | Age: 75
End: 2020-01-27

## 2020-01-28 ENCOUNTER — TELEPHONE (OUTPATIENT)
Dept: ONCOLOGY | Facility: CLINIC | Age: 75
End: 2020-01-28

## 2020-02-05 ENCOUNTER — OFFICE VISIT (OUTPATIENT)
Dept: ONCOLOGY | Facility: CLINIC | Age: 75
End: 2020-02-05

## 2020-02-05 ENCOUNTER — LAB (OUTPATIENT)
Dept: LAB | Facility: HOSPITAL | Age: 75
End: 2020-02-05

## 2020-02-05 VITALS
SYSTOLIC BLOOD PRESSURE: 157 MMHG | TEMPERATURE: 98.6 F | HEIGHT: 69 IN | RESPIRATION RATE: 16 BRPM | HEART RATE: 70 BPM | BODY MASS INDEX: 37.43 KG/M2 | OXYGEN SATURATION: 95 % | WEIGHT: 252.7 LBS | DIASTOLIC BLOOD PRESSURE: 77 MMHG

## 2020-02-05 DIAGNOSIS — D50.0 IRON DEFICIENCY ANEMIA DUE TO CHRONIC BLOOD LOSS: ICD-10-CM

## 2020-02-05 DIAGNOSIS — D50.0 IRON DEFICIENCY ANEMIA DUE TO CHRONIC BLOOD LOSS: Primary | ICD-10-CM

## 2020-02-05 DIAGNOSIS — N18.30 CHRONIC KIDNEY DISEASE, STAGE III (MODERATE) (HCC): ICD-10-CM

## 2020-02-05 LAB
ALBUMIN SERPL-MCNC: 4.1 G/DL (ref 3.5–5.2)
ALBUMIN/GLOB SERPL: 1.7 G/DL (ref 1.1–2.4)
ALP SERPL-CCNC: 48 U/L (ref 38–116)
ALT SERPL W P-5'-P-CCNC: 8 U/L (ref 0–41)
ANION GAP SERPL CALCULATED.3IONS-SCNC: 12.8 MMOL/L (ref 5–15)
AST SERPL-CCNC: 13 U/L (ref 0–40)
BASOPHILS # BLD AUTO: 0.04 10*3/MM3 (ref 0–0.2)
BASOPHILS NFR BLD AUTO: 0.6 % (ref 0–1.5)
BILIRUB SERPL-MCNC: 0.6 MG/DL (ref 0.2–1.2)
BUN BLD-MCNC: 15 MG/DL (ref 6–20)
BUN/CREAT SERPL: 14.3 (ref 7.3–30)
CALCIUM SPEC-SCNC: 9.6 MG/DL (ref 8.5–10.2)
CHLORIDE SERPL-SCNC: 102 MMOL/L (ref 98–107)
CO2 SERPL-SCNC: 25.2 MMOL/L (ref 22–29)
CREAT BLD-MCNC: 1.05 MG/DL (ref 0.7–1.3)
DEPRECATED RDW RBC AUTO: 43.8 FL (ref 37–54)
EOSINOPHIL # BLD AUTO: 0.1 10*3/MM3 (ref 0–0.4)
EOSINOPHIL NFR BLD AUTO: 1.5 % (ref 0.3–6.2)
ERYTHROCYTE [DISTWIDTH] IN BLOOD BY AUTOMATED COUNT: 12.9 % (ref 12.3–15.4)
FERRITIN SERPL-MCNC: 69.2 NG/ML (ref 30–400)
GFR SERPL CREATININE-BSD FRML MDRD: 69 ML/MIN/1.73
GLOBULIN UR ELPH-MCNC: 2.4 GM/DL (ref 1.8–3.5)
GLUCOSE BLD-MCNC: 161 MG/DL (ref 74–124)
HCT VFR BLD AUTO: 41.8 % (ref 37.5–51)
HGB BLD-MCNC: 13.9 G/DL (ref 13–17.7)
IMM GRANULOCYTES # BLD AUTO: 0.03 10*3/MM3 (ref 0–0.05)
IMM GRANULOCYTES NFR BLD AUTO: 0.5 % (ref 0–0.5)
IRON 24H UR-MRATE: 109 MCG/DL (ref 59–158)
IRON SATN MFR SERPL: 33 % (ref 14–48)
LYMPHOCYTES # BLD AUTO: 1.13 10*3/MM3 (ref 0.7–3.1)
LYMPHOCYTES NFR BLD AUTO: 17 % (ref 19.6–45.3)
MCH RBC QN AUTO: 31 PG (ref 26.6–33)
MCHC RBC AUTO-ENTMCNC: 33.3 G/DL (ref 31.5–35.7)
MCV RBC AUTO: 93.1 FL (ref 79–97)
MONOCYTES # BLD AUTO: 0.49 10*3/MM3 (ref 0.1–0.9)
MONOCYTES NFR BLD AUTO: 7.4 % (ref 5–12)
NEUTROPHILS # BLD AUTO: 4.87 10*3/MM3 (ref 1.7–7)
NEUTROPHILS NFR BLD AUTO: 73 % (ref 42.7–76)
NRBC BLD AUTO-RTO: 0 /100 WBC (ref 0–0.2)
PLATELET # BLD AUTO: 297 10*3/MM3 (ref 140–450)
PMV BLD AUTO: 9 FL (ref 6–12)
POTASSIUM BLD-SCNC: 4.4 MMOL/L (ref 3.5–4.7)
PROT SERPL-MCNC: 6.5 G/DL (ref 6.3–8)
RBC # BLD AUTO: 4.49 10*6/MM3 (ref 4.14–5.8)
SODIUM BLD-SCNC: 140 MMOL/L (ref 134–145)
TIBC SERPL-MCNC: 330 MCG/DL (ref 249–505)
TRANSFERRIN SERPL-MCNC: 236 MG/DL (ref 200–360)
WBC NRBC COR # BLD: 6.66 10*3/MM3 (ref 3.4–10.8)

## 2020-02-05 PROCEDURE — 84466 ASSAY OF TRANSFERRIN: CPT

## 2020-02-05 PROCEDURE — 36415 COLL VENOUS BLD VENIPUNCTURE: CPT

## 2020-02-05 PROCEDURE — 82728 ASSAY OF FERRITIN: CPT

## 2020-02-05 PROCEDURE — 80053 COMPREHEN METABOLIC PANEL: CPT

## 2020-02-05 PROCEDURE — 85025 COMPLETE CBC W/AUTO DIFF WBC: CPT

## 2020-02-05 PROCEDURE — 83540 ASSAY OF IRON: CPT

## 2020-02-05 PROCEDURE — 99214 OFFICE O/P EST MOD 30 MIN: CPT | Performed by: INTERNAL MEDICINE

## 2020-02-05 NOTE — PROGRESS NOTES
History:     Reason for follow up:   1. 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 He follows with Dr Prakash as well. He had progressive fatigue despite oral iron and thus we arranged for 2 doses of Feraheme which he received in 2018 with a nice response in his hemoglobin.    He's feeling well today and fatigue is improved. Has dark stools but attributes this to his iron that he's taking daily without GI troubles. His hemoglobin today is excellent at 14 his iron studies are pending.    I have reviewed, verified and personally updated the past medical, surgical, family and social history.      Past Medical   Past Medical History:   Diagnosis Date   • Anemia    • Anxiety    • Arthritis    • Chronic kidney disease     CKD stage 3   • Coronary artery disease     rheumatic fever at age 7   • Diabetes mellitus (CMS/HCC)    • GERD (gastroesophageal reflux disease)    • GI bleed    • Hyperlipidemia    • Hypertension    • Low back pain    • Rheumatic fever    • Spinal stenosis, lumbar     and   Patient Active Problem List   Diagnosis   • Anemia   • Anxiety   • Chronic kidney disease, stage III (moderate) (CMS/HCC)   • Diabetes mellitus (CMS/HCC)   • Erectile dysfunction of nonorganic origin   • Gastroesophageal reflux disease   • Hyperlipidemia   • Essential hypertension   • Iron deficiency anemia     Social History   Social History     Socioeconomic History   • Marital status:      Spouse name: Lea   • Number of children: 3   • Years of education: Not on file   • Highest education level: Not on file   Occupational History   • Occupation:      Employer: RETIRED   Tobacco Use   • Smoking status: Former Smoker     Packs/day: 1.50     Years: 18.00     Pack years: 27.00     Types: Cigarettes     Last attempt to quit: 2000     Years since quittin.1   • Smokeless tobacco:  "Former User   • Tobacco comment: quit 1997   Substance and Sexual Activity   • Alcohol use: No   • Drug use: No   • Sexual activity: Defer     Family History  Family History   Problem Relation Age of Onset   • Diverticulosis Father    • Diverticulitis Father    • Alzheimer's disease Mother    • No Known Problems Sister      Allergies  No Known Allergies    Medications: The current medication list was reviewed in the EMR.    Review of Systems  Review of Systems   Constitutional: Negative for activity change, appetite change, chills, diaphoresis, fatigue, fever and unexpected weight change.   HENT: Negative for congestion, hearing loss, nosebleeds, sinus pressure and trouble swallowing.    Respiratory: Negative for cough, chest tightness, shortness of breath and wheezing.    Cardiovascular: Negative for chest pain, palpitations and leg swelling.   Gastrointestinal: Positive for blood in stool. Negative for abdominal pain, constipation, diarrhea, nausea and vomiting.   Musculoskeletal: Positive for arthralgias and back pain. Negative for neck pain.   Hematological: Negative for adenopathy. Does not bruise/bleed easily.       Objective    Objective:     Vitals:    02/05/20 1652   BP: 157/77   Pulse: 70   Resp: 16   Temp: 98.6 °F (37 °C)   TempSrc: Oral   SpO2: 95%   Weight: 115 kg (252 lb 11.2 oz)   Height: 174 cm (68.5\")  Comment: new height   PainSc: 0-No pain     Current Status 2/5/2020   ECOG score 0     GENERAL:  Well-developed, well-nourished male in no acute distress. Vital signs reviewed.   SKIN:  Warm, dry without rashes, purpura or petechiae.  EYES:  Pupils equal, round and reactive to light.  EOMs intact.  Conjunctivae normal.  HEAD:  Normocephalic.  EARS/NOSE/MOUTH/THROAT:  Hearing intact. Septum midline.  No excoriations or nasal discharge. No stomatitis or ulcers.  Lips normal. Oropharynx without lesions or exudates.  NECK:  Supple with good range of motion; no thyromegaly or masses  RESP:  Lungs clear to " percussion and auscultation. Good airflow. Normal respiratory effort.   CARDIAC:  Regular rate and rhythm without murmurs, rubs or gallops. Normal S1,S2. No edema  GI:  Soft, nontender, normal bowel sounds  MSK:  No clubbing or cyanosis, No joint swelling noted in hands  PSYCHIATRIC:  Normal affect and mood.  Alert and Oriented x 3.      Labs and Imaging  Results for orders placed or performed in visit on 02/05/20   CBC Auto Differential   Result Value Ref Range    WBC 6.66 3.40 - 10.80 10*3/mm3    RBC 4.49 4.14 - 5.80 10*6/mm3    Hemoglobin 13.9 13.0 - 17.7 g/dL    Hematocrit 41.8 37.5 - 51.0 %    MCV 93.1 79.0 - 97.0 fL    MCH 31.0 26.6 - 33.0 pg    MCHC 33.3 31.5 - 35.7 g/dL    RDW 12.9 12.3 - 15.4 %    RDW-SD 43.8 37.0 - 54.0 fl    MPV 9.0 6.0 - 12.0 fL    Platelets 297 140 - 450 10*3/mm3    Neutrophil % 73.0 42.7 - 76.0 %    Lymphocyte % 17.0 (L) 19.6 - 45.3 %    Monocyte % 7.4 5.0 - 12.0 %    Eosinophil % 1.5 0.3 - 6.2 %    Basophil % 0.6 0.0 - 1.5 %    Immature Grans % 0.5 0.0 - 0.5 %    Neutrophils, Absolute 4.87 1.70 - 7.00 10*3/mm3    Lymphocytes, Absolute 1.13 0.70 - 3.10 10*3/mm3    Monocytes, Absolute 0.49 0.10 - 0.90 10*3/mm3    Eosinophils, Absolute 0.10 0.00 - 0.40 10*3/mm3    Basophils, Absolute 0.04 0.00 - 0.20 10*3/mm3    Immature Grans, Absolute 0.03 0.00 - 0.05 10*3/mm3    nRBC 0.0 0.0 - 0.2 /100 WBC       Assessment/Plan   Assessment/Plan:   1. 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    As noted above, he had previous excellent response to IV Feraheme infusions 2 years ago but has since been remaining in the normal range taking daily oral iron.    Plan  1.  We have not scheduled routine follow-up in our office but have recommended that he continue on daily oral iron supplementation long-term.  We would be happy to see him again anytime in the future if he becomes anemic again and needs additional IV iron therapy.

## 2020-03-16 ENCOUNTER — TELEPHONE (OUTPATIENT)
Dept: ORTHOPEDIC SURGERY | Facility: CLINIC | Age: 75
End: 2020-03-16

## 2020-03-16 NOTE — TELEPHONE ENCOUNTER
PATIENT WAS LAST SEEN BY YOU IN FEB OF 2017. WANTS TO GET MORE LUMBAR EPIDURALS, CAN YOU ORDER OR DO YOU NEED TO SEE HIM FIRST?

## 2020-03-17 DIAGNOSIS — F41.9 ANXIETY: Primary | ICD-10-CM

## 2020-03-17 RX ORDER — CITALOPRAM 20 MG/1
20 TABLET ORAL DAILY
Qty: 90 TABLET | Refills: 3 | Status: SHIPPED | OUTPATIENT
Start: 2020-03-17 | End: 2020-04-07

## 2020-03-24 DIAGNOSIS — I10 ESSENTIAL HYPERTENSION: ICD-10-CM

## 2020-03-25 RX ORDER — LISINOPRIL 10 MG/1
TABLET ORAL
Qty: 90 TABLET | Refills: 0 | Status: SHIPPED | OUTPATIENT
Start: 2020-03-25 | End: 2020-04-07 | Stop reason: SDUPTHER

## 2020-04-07 ENCOUNTER — OFFICE VISIT (OUTPATIENT)
Dept: FAMILY MEDICINE CLINIC | Facility: CLINIC | Age: 75
End: 2020-04-07

## 2020-04-07 VITALS — SYSTOLIC BLOOD PRESSURE: 125 MMHG | DIASTOLIC BLOOD PRESSURE: 65 MMHG

## 2020-04-07 DIAGNOSIS — E11.9 TYPE 2 DIABETES MELLITUS WITHOUT COMPLICATION, WITHOUT LONG-TERM CURRENT USE OF INSULIN (HCC): Primary | ICD-10-CM

## 2020-04-07 DIAGNOSIS — K21.9 GASTROESOPHAGEAL REFLUX DISEASE, ESOPHAGITIS PRESENCE NOT SPECIFIED: ICD-10-CM

## 2020-04-07 DIAGNOSIS — I10 ESSENTIAL HYPERTENSION: ICD-10-CM

## 2020-04-07 PROCEDURE — 99441 PR PHYS/QHP TELEPHONE EVALUATION 5-10 MIN: CPT | Performed by: NURSE PRACTITIONER

## 2020-04-07 RX ORDER — LISINOPRIL 10 MG/1
10 TABLET ORAL DAILY
Qty: 90 TABLET | Refills: 3 | Status: SHIPPED | OUTPATIENT
Start: 2020-04-07 | End: 2021-04-08 | Stop reason: SDUPTHER

## 2020-04-07 RX ORDER — OMEPRAZOLE 40 MG/1
40 CAPSULE, DELAYED RELEASE ORAL DAILY
Qty: 90 CAPSULE | Refills: 3 | Status: SHIPPED | OUTPATIENT
Start: 2020-04-07 | End: 2021-04-09

## 2020-04-07 RX ORDER — AMLODIPINE BESYLATE 10 MG/1
10 TABLET ORAL DAILY
Qty: 90 TABLET | Refills: 3 | Status: SHIPPED | OUTPATIENT
Start: 2020-04-07 | End: 2021-04-08 | Stop reason: SDUPTHER

## 2020-04-07 NOTE — PROGRESS NOTES
You have chosen to receive care through a telephone visit today. Do you consent to use a telephone visit for your medical care today? Yes      Patient calling for a med check today.  He is taking all his medications without any side effects his blood pressure is what very well today he has no problems or complaints he is requesting some med refills which I sent to his pharmacy.  He is to recheck with me in 6 weeks for labs and another med check    Call lasted 6 mins

## 2020-05-29 ENCOUNTER — OFFICE VISIT (OUTPATIENT)
Dept: FAMILY MEDICINE CLINIC | Facility: CLINIC | Age: 75
End: 2020-05-29

## 2020-05-29 VITALS
OXYGEN SATURATION: 99 % | SYSTOLIC BLOOD PRESSURE: 136 MMHG | HEIGHT: 69 IN | WEIGHT: 235 LBS | HEART RATE: 66 BPM | DIASTOLIC BLOOD PRESSURE: 74 MMHG | BODY MASS INDEX: 34.8 KG/M2

## 2020-05-29 DIAGNOSIS — I10 ESSENTIAL HYPERTENSION: ICD-10-CM

## 2020-05-29 DIAGNOSIS — K21.9 GASTROESOPHAGEAL REFLUX DISEASE, ESOPHAGITIS PRESENCE NOT SPECIFIED: ICD-10-CM

## 2020-05-29 DIAGNOSIS — E11.9 TYPE 2 DIABETES MELLITUS WITHOUT COMPLICATION, WITHOUT LONG-TERM CURRENT USE OF INSULIN (HCC): Primary | ICD-10-CM

## 2020-05-29 DIAGNOSIS — E78.01 FAMILIAL HYPERCHOLESTEROLEMIA: ICD-10-CM

## 2020-05-29 LAB — HBA1C MFR BLD: 6.5 %

## 2020-05-29 PROCEDURE — 99214 OFFICE O/P EST MOD 30 MIN: CPT | Performed by: NURSE PRACTITIONER

## 2020-05-29 PROCEDURE — 83036 HEMOGLOBIN GLYCOSYLATED A1C: CPT | Performed by: NURSE PRACTITIONER

## 2020-05-29 NOTE — PROGRESS NOTES
"Subjective   Danyel Dash is a 74 y.o. male.     History of Present Illness    Since the last visit, he has overall felt well.  He has Essential Hypertension and well controlled on current medication, DMII well controlled on medication and will continue regimen, GERD controlled on PPI Rx and Hyperlipidemia with goals met with current Rx.  he has been compliant with current medications have reviewed them.  The patient denies medication side effects.  Will refill medications. /74 (BP Location: Right arm, Patient Position: Sitting)   Pulse 66   Ht 174 cm (68.5\")   Wt 107 kg (235 lb)   SpO2 99%   BMI 35.21 kg/m²     Results for orders placed or performed in visit on 05/29/20   POC Glycosylated Hemoglobin (Hb A1C)   Result Value Ref Range    Hemoglobin A1C 6.5 %         The following portions of the patient's history were reviewed and updated as appropriate: allergies, current medications, past social history and problem list.    Review of Systems   Constitutional: Negative for fever.   Respiratory: Negative for cough and shortness of breath.    Cardiovascular: Negative for chest pain.   Gastrointestinal: Negative for abdominal pain.   Neurological: Negative for dizziness.       Objective   /74 (BP Location: Right arm, Patient Position: Sitting)   Pulse 66   Ht 174 cm (68.5\")   Wt 107 kg (235 lb)   SpO2 99%   BMI 35.21 kg/m²   Physical Exam   Constitutional: He is oriented to person, place, and time. Vital signs are normal. He appears well-developed and well-nourished. No distress.   HENT:   Head: Normocephalic.   Cardiovascular: Normal rate, regular rhythm and normal heart sounds.   Pulmonary/Chest: Effort normal and breath sounds normal.   Neurological: He is alert and oriented to person, place, and time. Gait normal.   Psychiatric: He has a normal mood and affect. His behavior is normal. Judgment and thought content normal.   Vitals reviewed.      Assessment/Plan      Diagnosis Plan   1. Type " 2 diabetes mellitus without complication, without long-term current use of insulin (CMS/[facility])  POC Glycosylated Hemoglobin (Hb A1C)   2. Gastroesophageal reflux disease, esophagitis presence not specified     3. Familial hypercholesterolemia     4. Essential hypertension       Cont same with meds  rto in 3 shalom Loya, APRN  5/29/2020

## 2020-06-16 ENCOUNTER — OFFICE VISIT (OUTPATIENT)
Dept: ORTHOPEDIC SURGERY | Facility: CLINIC | Age: 75
End: 2020-06-16

## 2020-06-16 VITALS — HEIGHT: 69 IN | BODY MASS INDEX: 35.7 KG/M2 | WEIGHT: 241 LBS | TEMPERATURE: 98.7 F

## 2020-06-16 DIAGNOSIS — M48.061 SPINAL STENOSIS OF LUMBAR REGION WITHOUT NEUROGENIC CLAUDICATION: ICD-10-CM

## 2020-06-16 DIAGNOSIS — M54.50 LUMBAR SPINE PAIN: Primary | ICD-10-CM

## 2020-06-16 PROCEDURE — 99213 OFFICE O/P EST LOW 20 MIN: CPT | Performed by: ORTHOPAEDIC SURGERY

## 2020-06-16 PROCEDURE — 72100 X-RAY EXAM L-S SPINE 2/3 VWS: CPT | Performed by: ORTHOPAEDIC SURGERY

## 2020-06-16 NOTE — PROGRESS NOTES
New patient or new problem visit    Chief Complaint   Patient presents with   • Lumbar Spine - Establish Care       HPI: He complains of continued mild back but more significant bilateral lower extremity pain.  He is done very well over time with epidurals but I have not seen him in 3 years so I asked him to come in.  No fever chills weight loss balance difficulties bowel or bladder complaints.    PFSH: See chart- reviewed    Review of Systems   Constitutional: Positive for activity change. Negative for chills, fever and unexpected weight change.   HENT: Negative for trouble swallowing and voice change.    Eyes: Negative for visual disturbance.   Respiratory: Negative for cough and shortness of breath.    Cardiovascular: Negative for chest pain and leg swelling.   Gastrointestinal: Negative for abdominal pain, nausea and vomiting.   Endocrine: Negative for cold intolerance and heat intolerance.   Genitourinary: Negative for difficulty urinating, frequency and urgency.   Skin: Negative for rash and wound.   Allergic/Immunologic: Negative for immunocompromised state.   Neurological: Negative for weakness and numbness.   Hematological: Does not bruise/bleed easily.   Psychiatric/Behavioral: Negative for dysphoric mood. The patient is not nervous/anxious.        PE: On exam good strength in the lower extremities despite absent reflexes.  Sensation is intact and straight leg raise is negative.  X-rays obtained today show multilevel spondylosis well-maintained lordosis and no change from prior films.  MRI from 2017 showed mild foraminal stenosis.  I suspect it is worse.      MEDICAL DECISION MAKING    XRAY:     Other: n/a    Impression: Lumbar spinal stenosis    Plan: Lumbar epidural steroid injections.  I will see him as needed.

## 2020-07-07 ENCOUNTER — ANESTHESIA EVENT (OUTPATIENT)
Dept: PAIN MEDICINE | Facility: HOSPITAL | Age: 75
End: 2020-07-07

## 2020-07-07 ENCOUNTER — ANESTHESIA (OUTPATIENT)
Dept: PAIN MEDICINE | Facility: HOSPITAL | Age: 75
End: 2020-07-07

## 2020-07-07 ENCOUNTER — HOSPITAL ENCOUNTER (OUTPATIENT)
Dept: GENERAL RADIOLOGY | Facility: HOSPITAL | Age: 75
Discharge: HOME | End: 2020-07-07

## 2020-07-07 ENCOUNTER — HOSPITAL ENCOUNTER (OUTPATIENT)
Dept: PAIN MEDICINE | Facility: HOSPITAL | Age: 75
Discharge: HOME | End: 2020-07-07
Admitting: ANESTHESIOLOGY

## 2020-07-07 VITALS
HEIGHT: 69 IN | OXYGEN SATURATION: 98 % | SYSTOLIC BLOOD PRESSURE: 158 MMHG | HEART RATE: 57 BPM | BODY MASS INDEX: 33.33 KG/M2 | DIASTOLIC BLOOD PRESSURE: 78 MMHG | RESPIRATION RATE: 16 BRPM | WEIGHT: 225 LBS | TEMPERATURE: 98 F

## 2020-07-07 DIAGNOSIS — R52 PAIN: ICD-10-CM

## 2020-07-07 DIAGNOSIS — M54.16 LUMBAR NEURITIS: Primary | ICD-10-CM

## 2020-07-07 PROCEDURE — C1755 CATHETER, INTRASPINAL: HCPCS

## 2020-07-07 PROCEDURE — 77003 FLUOROGUIDE FOR SPINE INJECT: CPT

## 2020-07-07 PROCEDURE — 25010000002 METHYLPREDNISOLONE PER 80 MG: Performed by: ANESTHESIOLOGY

## 2020-07-07 RX ORDER — FENTANYL CITRATE 50 UG/ML
50 INJECTION, SOLUTION INTRAMUSCULAR; INTRAVENOUS AS NEEDED
Status: DISCONTINUED | OUTPATIENT
Start: 2020-07-07 | End: 2020-07-08 | Stop reason: HOSPADM

## 2020-07-07 RX ORDER — MIDAZOLAM HYDROCHLORIDE 1 MG/ML
1 INJECTION INTRAMUSCULAR; INTRAVENOUS AS NEEDED
Status: DISCONTINUED | OUTPATIENT
Start: 2020-07-07 | End: 2020-07-08 | Stop reason: HOSPADM

## 2020-07-07 RX ORDER — METHYLPREDNISOLONE ACETATE 80 MG/ML
80 INJECTION, SUSPENSION INTRA-ARTICULAR; INTRALESIONAL; INTRAMUSCULAR; SOFT TISSUE ONCE
Status: COMPLETED | OUTPATIENT
Start: 2020-07-07 | End: 2020-07-07

## 2020-07-07 RX ORDER — ACETAMINOPHEN 325 MG/1
650 TABLET ORAL EVERY 6 HOURS PRN
COMMUNITY

## 2020-07-07 RX ORDER — SODIUM CHLORIDE 0.9 % (FLUSH) 0.9 %
1-10 SYRINGE (ML) INJECTION AS NEEDED
Status: DISCONTINUED | OUTPATIENT
Start: 2020-07-07 | End: 2020-07-08 | Stop reason: HOSPADM

## 2020-07-07 RX ORDER — LIDOCAINE HYDROCHLORIDE 10 MG/ML
1 INJECTION, SOLUTION INFILTRATION; PERINEURAL ONCE AS NEEDED
Status: DISCONTINUED | OUTPATIENT
Start: 2020-07-07 | End: 2020-07-08 | Stop reason: HOSPADM

## 2020-07-07 RX ADMIN — METHYLPREDNISOLONE ACETATE 80 MG: 80 INJECTION, SUSPENSION INTRA-ARTICULAR; INTRALESIONAL; INTRAMUSCULAR; SOFT TISSUE at 11:59

## 2020-07-07 NOTE — ANESTHESIA PROCEDURE NOTES
PAIN Epidural block      Patient reassessed immediately prior to procedure    Patient location during procedure: pain clinic  Indication:procedure for pain  Performed By  Anesthesiologist: Isaac Carr MD  Preanesthetic Checklist  Completed: patient identified and risks and benefits discussed  Additional Notes  Diagnosis:     Post-Op Diagnosis Codes:     * Spinal stenosis of lumbar region with neurogenic claudication (M48.062)    Sedation:  none    A lumbar epidural steroid injection with fluoroscopic guidance was performed.  Under fluoroscopic guidance, the epidural space was identified and accessed, confirmed by loss of resistance to saline.  The above medications were injected uneventfully.    Prep:  Pt Position:prone  Sterile Tech:cap, gloves, mask and sterile barrier  Prep:chlorhexidine gluconate and isopropyl alcohol  Monitoring:blood pressure monitoring, continuous pulse oximetry and EKG  Procedure:Sedation: no     Approach:right paramedian  Guidance: fluoroscopy  Location:lumbar  Level:4-5  Needle Type:Tuohy  Needle Gauge:20  Aspiration:negative  Medications:  Depomedrol:80  Preservative Free Saline:1mL    Post Assessment:  Pt Tolerance:patient tolerated the procedure well with no apparent complications  Complications:no

## 2020-07-07 NOTE — H&P
CHIEF COMPLAINT: Back and leg pain      HISTORY OF PRESENT ILLNESS:  He was seen in our clinic over a year ago for a series of injections which helped him.  Currently describes low lumbar back pain which radiates into his lower legs on occasion.  Between a 7 and 8 out of 10 on the pain scale, intermittent timing, aching dull in nature.  Aggravated by lifting and standing.  Relieved by pain medications and rest.  It does affect his sleep as well as his exercise.  For over 6 weeks he is tried rest Tylenol without relief.  His previous MRI shows spinal stenosis    PAST MEDICAL HISTORY:  Current Outpatient Medications on File Prior to Encounter   Medication Sig Dispense Refill   • amLODIPine (NORVASC) 10 MG tablet Take 1 tablet by mouth Daily. 90 tablet 3   • aspirin 81 MG EC tablet Take 81 mg by mouth Daily.     • Blood Gluc Meter Disp-Strips device Pt to monitor blood glucose two times daily 1 each 0   • buPROPion XL (WELLBUTRIN XL) 300 MG 24 hr tablet Take 1 tablet by mouth Daily. 90 tablet 3   • Cholecalciferol (VITAMIN D-3) 1000 UNITS capsule Take 1,000 Units by mouth Daily.     • fluticasone (FLONASE) 50 MCG/ACT nasal spray 2 sprays into each nostril Daily. 15.8 mL 0   • Iron, Ferrous Gluconate, 256 (28 FE) MG tablet Take 1 tablet by mouth daily.     • lisinopril (PRINIVIL,ZESTRIL) 10 MG tablet Take 1 tablet by mouth Daily. 90 tablet 3   • omeprazole (priLOSEC) 40 MG capsule Take 1 capsule by mouth Daily. 90 capsule 3   • pioglitazone (ACTOS) 45 MG tablet Take 1 tablet by mouth Daily. 90 tablet 3   • simvastatin (ZOCOR) 20 MG tablet Take 1 tablet by mouth Daily. 90 tablet 3   • vitamin B-12 (CYANOCOBALAMIN) 1000 MCG tablet Take 1,000 mcg by mouth Daily.     • vitamin C (ASCORBIC ACID) 500 MG tablet Take 500 mg by mouth Daily.       No current facility-administered medications on file prior to encounter.        Past Medical History:   Diagnosis Date   • Anemia    • Anxiety    • Arthritis    • Chronic kidney disease      CKD stage 3   • Coronary artery disease     rheumatic fever at age 7   • Diabetes mellitus (CMS/HCC)    • GERD (gastroesophageal reflux disease)    • GI bleed    • Hyperlipidemia    • Hypertension    • Low back pain    • Rheumatic fever    • Spinal stenosis, lumbar          SOCIAL HISTORY:  No tobacco    REVIEW OF SYSTEMS:  No hematologic infectious or constitutional symptoms  Other review of systems non-contributory  Negative screen for MARTINEZ      PHYSICAL EXAM:  There were no vitals taken for this visit.  Well-developed well-nourished no acute distress  Extra ocular movements intact  Mallampati class 2 airway  Cardiac:  Regular rate and rhythm  Lungs:  Clear to auscultation bilaterally with good effort  Alert and oriented ×3  Deep tendon reflexes normal in the bilateral patella  Negative straight leg raise bilaterally  5 out of 5 strength bilateral upper and lower extremities  Lumbar spine without obvious deformities ecchymoses  Lumbar spine nontender to palpation      DIAGNOSIS:  Post-Op Diagnosis Codes:     * Spinal stenosis of lumbar region with neurogenic claudication [M48.062]    PLAN:  1.  Lumbar 4 epidural steroid injections, up to 3, spaced 1-2 weeks apart.  If pain control is acceptable after 1 or 2 injections, it was discussed with the patient that they may return for the subsequent injections if and when their pain returns.  The risks were discussed with the patient including failure of relief, worsening pain, Headache (post dural puncture headache), bleeding (epidural hematoma) and infection (epidural abscess or skin infection).  2.  Physical therapy exercises at home as prescribed by physical therapy or from the pain clinic handout (given to the patient).  Continuation of these exercises every day, or multiple times per week, even when the patient has good pain relief, was stressed to the patient as a preventative measure to decrease the frequency and severity of future pain episodes.  3.  Continue  pain medicines as already prescribed.  If patient not currently taking any, it is recommended to begin Acetaminophen 1000 mg po q 8 hours.  If other medicines containing Acetaminophen are currently prescribed, maintain daily dose at 3000 mg.    4.  If they can tolerate NSAIDS, it is recommended to take Ibuprofen 600 mg po q 6 hours for 7 days during pain exacerbations.  Alternatively, they may substitute an NSAID of their choice (e.g. Aleve).  This may be taken at the same time as Acetaminophen.  5.  Heat and ice to the affected area as tolerated for pain control.  It was discussed that heating pads can cause burns.  6.  Daily low impact exercise such as walking or water exercise was recommended to maintain overall health and aid in weight control.   7.  Follow up as needed for subsequent injections.  8.  Patient was counseled to abstain from tobacco products.

## 2020-11-15 DIAGNOSIS — E11.9 TYPE 2 DIABETES MELLITUS WITHOUT COMPLICATION, WITHOUT LONG-TERM CURRENT USE OF INSULIN (HCC): ICD-10-CM

## 2020-11-16 RX ORDER — PIOGLITAZONEHYDROCHLORIDE 45 MG/1
TABLET ORAL
Qty: 90 TABLET | Refills: 2 | Status: SHIPPED | OUTPATIENT
Start: 2020-11-16 | End: 2021-04-08 | Stop reason: SDUPTHER

## 2020-12-08 DIAGNOSIS — F41.9 ANXIETY: ICD-10-CM

## 2020-12-09 RX ORDER — BUPROPION HYDROCHLORIDE 300 MG/1
TABLET ORAL
Qty: 90 TABLET | Refills: 2 | Status: SHIPPED | OUTPATIENT
Start: 2020-12-09 | End: 2021-04-08 | Stop reason: SDUPTHER

## 2020-12-28 DIAGNOSIS — E78.01 FAMILIAL HYPERCHOLESTEROLEMIA: ICD-10-CM

## 2020-12-29 RX ORDER — SIMVASTATIN 20 MG
TABLET ORAL
Qty: 90 TABLET | Refills: 1 | Status: SHIPPED | OUTPATIENT
Start: 2020-12-29 | End: 2021-04-08 | Stop reason: SDUPTHER

## 2021-03-09 DIAGNOSIS — Z23 IMMUNIZATION DUE: ICD-10-CM

## 2021-04-02 ENCOUNTER — TELEPHONE (OUTPATIENT)
Dept: ORTHOPEDIC SURGERY | Facility: CLINIC | Age: 76
End: 2021-04-02

## 2021-04-02 DIAGNOSIS — M54.50 LUMBAR SPINE PAIN: Primary | ICD-10-CM

## 2021-04-02 NOTE — TELEPHONE ENCOUNTER
Caller:  DAVID WATERMAN    Relationship to patient: SELF    Best call back number:  [phone]    Type of visit: EPIDURAL INJ    Additional notes: PATIENT'S LAST EPIDURAL INJ WAS 7-7-2020 (DR. TORRES), PATIENT WOULD LIKE A CALL BACK TO SCHEDULE NEW EPIDURAL INJECTION.

## 2021-04-08 ENCOUNTER — OFFICE VISIT (OUTPATIENT)
Dept: FAMILY MEDICINE CLINIC | Facility: CLINIC | Age: 76
End: 2021-04-08

## 2021-04-08 VITALS
OXYGEN SATURATION: 99 % | HEIGHT: 69 IN | SYSTOLIC BLOOD PRESSURE: 140 MMHG | BODY MASS INDEX: 35.84 KG/M2 | HEART RATE: 73 BPM | DIASTOLIC BLOOD PRESSURE: 70 MMHG | TEMPERATURE: 97.7 F | WEIGHT: 242 LBS

## 2021-04-08 DIAGNOSIS — E78.01 FAMILIAL HYPERCHOLESTEROLEMIA: ICD-10-CM

## 2021-04-08 DIAGNOSIS — K21.9 GASTROESOPHAGEAL REFLUX DISEASE: ICD-10-CM

## 2021-04-08 DIAGNOSIS — K21.9 GASTROESOPHAGEAL REFLUX DISEASE, UNSPECIFIED WHETHER ESOPHAGITIS PRESENT: ICD-10-CM

## 2021-04-08 DIAGNOSIS — Z12.5 SPECIAL SCREENING FOR MALIGNANT NEOPLASM OF PROSTATE: ICD-10-CM

## 2021-04-08 DIAGNOSIS — D50.0 IRON DEFICIENCY ANEMIA DUE TO CHRONIC BLOOD LOSS: ICD-10-CM

## 2021-04-08 DIAGNOSIS — F41.9 ANXIETY: ICD-10-CM

## 2021-04-08 DIAGNOSIS — I10 ESSENTIAL HYPERTENSION: ICD-10-CM

## 2021-04-08 DIAGNOSIS — E11.9 TYPE 2 DIABETES MELLITUS WITHOUT COMPLICATION, WITHOUT LONG-TERM CURRENT USE OF INSULIN (HCC): Primary | ICD-10-CM

## 2021-04-08 LAB
ALBUMIN SERPL-MCNC: 4.2 G/DL (ref 3.5–5.2)
ALBUMIN/GLOB SERPL: 2.2 G/DL
ALP SERPL-CCNC: 49 U/L (ref 39–117)
ALT SERPL-CCNC: 11 U/L (ref 1–41)
AST SERPL-CCNC: 15 U/L (ref 1–40)
BASOPHILS # BLD AUTO: 0.04 10*3/MM3 (ref 0–0.2)
BASOPHILS NFR BLD AUTO: 0.7 % (ref 0–1.5)
BILIRUB SERPL-MCNC: 0.6 MG/DL (ref 0–1.2)
BUN SERPL-MCNC: 19 MG/DL (ref 8–23)
BUN/CREAT SERPL: 17.1 (ref 7–25)
CALCIUM SERPL-MCNC: 9.7 MG/DL (ref 8.6–10.5)
CHLORIDE SERPL-SCNC: 104 MMOL/L (ref 98–107)
CHOLEST SERPL-MCNC: 160 MG/DL (ref 0–200)
CO2 SERPL-SCNC: 24.3 MMOL/L (ref 22–29)
CREAT SERPL-MCNC: 1.11 MG/DL (ref 0.76–1.27)
EOSINOPHIL # BLD AUTO: 0.08 10*3/MM3 (ref 0–0.4)
EOSINOPHIL NFR BLD AUTO: 1.4 % (ref 0.3–6.2)
ERYTHROCYTE [DISTWIDTH] IN BLOOD BY AUTOMATED COUNT: 13 % (ref 12.3–15.4)
GLOBULIN SER CALC-MCNC: 1.9 GM/DL
GLUCOSE SERPL-MCNC: 193 MG/DL (ref 65–99)
HBA1C MFR BLD: 6.2 %
HCT VFR BLD AUTO: 41.5 % (ref 37.5–51)
HDLC SERPL-MCNC: 39 MG/DL (ref 40–60)
HGB BLD-MCNC: 13.7 G/DL (ref 13–17.7)
IMM GRANULOCYTES # BLD AUTO: 0.03 10*3/MM3 (ref 0–0.05)
IMM GRANULOCYTES NFR BLD AUTO: 0.5 % (ref 0–0.5)
IRON SATN MFR SERPL: 33 % (ref 20–50)
IRON SERPL-MCNC: 121 MCG/DL (ref 59–158)
LDLC SERPL CALC-MCNC: 97 MG/DL (ref 0–100)
LDLC/HDLC SERPL: 2.4 {RATIO}
LYMPHOCYTES # BLD AUTO: 0.89 10*3/MM3 (ref 0.7–3.1)
LYMPHOCYTES NFR BLD AUTO: 15.1 % (ref 19.6–45.3)
MCH RBC QN AUTO: 30.7 PG (ref 26.6–33)
MCHC RBC AUTO-ENTMCNC: 33 G/DL (ref 31.5–35.7)
MCV RBC AUTO: 93 FL (ref 79–97)
MONOCYTES # BLD AUTO: 0.41 10*3/MM3 (ref 0.1–0.9)
MONOCYTES NFR BLD AUTO: 6.9 % (ref 5–12)
NEUTROPHILS # BLD AUTO: 4.45 10*3/MM3 (ref 1.7–7)
NEUTROPHILS NFR BLD AUTO: 75.4 % (ref 42.7–76)
NRBC BLD AUTO-RTO: 0 /100 WBC (ref 0–0.2)
PLATELET # BLD AUTO: 350 10*3/MM3 (ref 140–450)
POTASSIUM SERPL-SCNC: 4.7 MMOL/L (ref 3.5–5.2)
PROT SERPL-MCNC: 6.1 G/DL (ref 6–8.5)
PSA SERPL-MCNC: 0.61 NG/ML (ref 0–4)
RBC # BLD AUTO: 4.46 10*6/MM3 (ref 4.14–5.8)
SODIUM SERPL-SCNC: 139 MMOL/L (ref 136–145)
TIBC SERPL-MCNC: 365 MCG/DL
TRIGL SERPL-MCNC: 137 MG/DL (ref 0–150)
UIBC SERPL-MCNC: 244 MCG/DL (ref 112–346)
VLDLC SERPL CALC-MCNC: 24 MG/DL (ref 5–40)
WBC # BLD AUTO: 5.9 10*3/MM3 (ref 3.4–10.8)

## 2021-04-08 PROCEDURE — 99214 OFFICE O/P EST MOD 30 MIN: CPT | Performed by: NURSE PRACTITIONER

## 2021-04-08 PROCEDURE — 83036 HEMOGLOBIN GLYCOSYLATED A1C: CPT | Performed by: NURSE PRACTITIONER

## 2021-04-08 RX ORDER — SIMVASTATIN 20 MG
20 TABLET ORAL DAILY
Qty: 90 TABLET | Refills: 3 | Status: SHIPPED | OUTPATIENT
Start: 2021-04-08 | End: 2022-04-11 | Stop reason: SDUPTHER

## 2021-04-08 RX ORDER — LISINOPRIL 10 MG/1
10 TABLET ORAL DAILY
Qty: 90 TABLET | Refills: 3 | Status: SHIPPED | OUTPATIENT
Start: 2021-04-08 | End: 2022-04-11

## 2021-04-08 RX ORDER — TRAMADOL HYDROCHLORIDE 50 MG/1
TABLET ORAL
COMMUNITY
Start: 2021-02-01 | End: 2021-12-08 | Stop reason: HOSPADM

## 2021-04-08 RX ORDER — PIOGLITAZONEHYDROCHLORIDE 45 MG/1
45 TABLET ORAL DAILY
Qty: 90 TABLET | Refills: 3 | Status: SHIPPED | OUTPATIENT
Start: 2021-04-08 | End: 2022-04-11 | Stop reason: SDUPTHER

## 2021-04-08 RX ORDER — BUPROPION HYDROCHLORIDE 300 MG/1
300 TABLET ORAL DAILY
Qty: 90 TABLET | Refills: 3 | Status: SHIPPED | OUTPATIENT
Start: 2021-04-08 | End: 2022-04-11 | Stop reason: SDUPTHER

## 2021-04-08 RX ORDER — QUINIDINE GLUCONATE 324 MG
480 TABLET, EXTENDED RELEASE ORAL
COMMUNITY
Start: 2021-01-29

## 2021-04-08 RX ORDER — AMLODIPINE BESYLATE 10 MG/1
10 TABLET ORAL DAILY
Qty: 90 TABLET | Refills: 3 | Status: SHIPPED | OUTPATIENT
Start: 2021-04-08 | End: 2022-04-11 | Stop reason: SDUPTHER

## 2021-04-08 RX ORDER — LORATADINE 10 MG/1
10 TABLET ORAL
COMMUNITY
Start: 2021-01-29

## 2021-04-08 NOTE — PROGRESS NOTES
"Chief Complaint  Diabetes (Patient is needing his a1c checked and wanting his right ear checked ( wore mask and goggles) )    Subjective          Danyel Dash presents to [facility]  History of Present Illness  #1 hypertension-patient is currently on amlodipine 10 mg and lisinopril 10 mg daily as directed without any side effects.  His blood pressure is well controlled today.    2.  Diabetes-patient is currently on Actos 45 mg daily as directed without any side effects.  His last A1c was in May 2020 with a result of 6.5.    3.  Hyperlipidemia-patient's last lipid was in October 2018 within a normal limit.  He is currently on simvastatin 20 mg daily as directed without any side effects.    4.  Anxiety-patient is currently on Wellbutrin  mg daily as directed with any side effects working well to control his anxiety.    6.  Acid reflux-patient is currently on omeprazole 40 mg daily as directed controlling his acid reflux is not currently having any symptoms.    #7 anemia- was released from specialist.  Using otc iron supplements and not having any s/s of anemia right now.     Objective   Vital Signs:   /70   Pulse 73   Temp 97.7 °F (36.5 °C)   Ht 175.3 cm (69.02\")   Wt 110 kg (242 lb)   SpO2 99%   BMI 35.72 kg/m²     Physical Exam  Vitals reviewed.   Constitutional:       General: He is not in acute distress.     Appearance: He is well-developed.   HENT:      Head: Normocephalic.   Cardiovascular:      Rate and Rhythm: Normal rate and regular rhythm.      Heart sounds: Normal heart sounds.   Pulmonary:      Effort: Pulmonary effort is normal.      Breath sounds: Normal breath sounds.   Neurological:      Mental Status: He is alert and oriented to person, place, and time.      Gait: Gait normal.   Psychiatric:         Behavior: Behavior normal.         Thought Content: Thought content normal.         Judgment: Judgment normal.        Result Review :               "   Assessment and Plan    Diagnoses and all orders for this visit:    1. Type 2 diabetes mellitus without complication, without long-term current use of insulin (CMS/[facility]) (Primary)  -     POC Glycosylated Hemoglobin (Hb A1C)  -     Comprehensive Metabolic Panel  -     pioglitazone (ACTOS) 45 MG tablet; Take 1 tablet by mouth Daily.  Dispense: 90 tablet; Refill: 3    2. Gastroesophageal reflux disease, unspecified whether esophagitis present    3. Familial hypercholesterolemia  -     Comprehensive Metabolic Panel  -     Lipid Panel With LDL / HDL Ratio  -     simvastatin (ZOCOR) 20 MG tablet; Take 1 tablet by mouth Daily.  Dispense: 90 tablet; Refill: 3    4. Essential hypertension  -     amLODIPine (NORVASC) 10 MG tablet; Take 1 tablet by mouth Daily.  Dispense: 90 tablet; Refill: 3  -     lisinopril (PRINIVIL,ZESTRIL) 10 MG tablet; Take 1 tablet by mouth Daily.  Dispense: 90 tablet; Refill: 3    5. Anxiety  -     buPROPion XL (WELLBUTRIN XL) 300 MG 24 hr tablet; Take 1 tablet by mouth Daily.  Dispense: 90 tablet; Refill: 3    6. Special screening for malignant neoplasm of prostate  -     PSA Screen    7. Iron deficiency anemia due to chronic blood loss  -     CBC & Differential  -     Iron Profile        Follow Up   No follow-ups on file.  Patient was given instructions and counseling regarding his condition or for health maintenance advice. Please see specific information pulled into the AVS if appropriate.     Continue same with all medications.  Follow-up after lab work.  Return to the office in 6 months for an A1c check.    Mask and googles worn

## 2021-04-09 RX ORDER — OMEPRAZOLE 40 MG/1
CAPSULE, DELAYED RELEASE ORAL
Qty: 90 CAPSULE | Refills: 2 | Status: SHIPPED | OUTPATIENT
Start: 2021-04-09 | End: 2021-10-08 | Stop reason: SDUPTHER

## 2021-04-16 ENCOUNTER — HOSPITAL ENCOUNTER (OUTPATIENT)
Dept: PAIN MEDICINE | Facility: HOSPITAL | Age: 76
Discharge: HOME | End: 2021-04-16

## 2021-04-16 ENCOUNTER — ANESTHESIA EVENT (OUTPATIENT)
Dept: PAIN MEDICINE | Facility: HOSPITAL | Age: 76
End: 2021-04-16

## 2021-04-16 ENCOUNTER — ANESTHESIA (OUTPATIENT)
Dept: PAIN MEDICINE | Facility: HOSPITAL | Age: 76
End: 2021-04-16

## 2021-04-16 ENCOUNTER — HOSPITAL ENCOUNTER (OUTPATIENT)
Dept: GENERAL RADIOLOGY | Facility: HOSPITAL | Age: 76
Discharge: HOME | End: 2021-04-16

## 2021-04-16 VITALS
SYSTOLIC BLOOD PRESSURE: 133 MMHG | RESPIRATION RATE: 16 BRPM | HEART RATE: 57 BPM | BODY MASS INDEX: 34.65 KG/M2 | TEMPERATURE: 98 F | DIASTOLIC BLOOD PRESSURE: 75 MMHG | WEIGHT: 242 LBS | OXYGEN SATURATION: 96 % | HEIGHT: 70 IN

## 2021-04-16 DIAGNOSIS — M48.061 SPINAL STENOSIS, LUMBAR REGION, WITHOUT NEUROGENIC CLAUDICATION: Primary | ICD-10-CM

## 2021-04-16 DIAGNOSIS — R52 PAIN: ICD-10-CM

## 2021-04-16 PROCEDURE — 25010000002 METHYLPREDNISOLONE PER 80 MG: Performed by: ANESTHESIOLOGY

## 2021-04-16 PROCEDURE — 77003 FLUOROGUIDE FOR SPINE INJECT: CPT

## 2021-04-16 RX ORDER — MIDAZOLAM HYDROCHLORIDE 1 MG/ML
1 INJECTION INTRAMUSCULAR; INTRAVENOUS AS NEEDED
Status: DISCONTINUED | OUTPATIENT
Start: 2021-04-16 | End: 2021-04-17 | Stop reason: HOSPADM

## 2021-04-16 RX ORDER — LIDOCAINE HYDROCHLORIDE 10 MG/ML
1 INJECTION, SOLUTION INFILTRATION; PERINEURAL ONCE AS NEEDED
Status: DISCONTINUED | OUTPATIENT
Start: 2021-04-16 | End: 2021-04-17 | Stop reason: HOSPADM

## 2021-04-16 RX ORDER — SODIUM CHLORIDE 0.9 % (FLUSH) 0.9 %
1-10 SYRINGE (ML) INJECTION AS NEEDED
Status: DISCONTINUED | OUTPATIENT
Start: 2021-04-16 | End: 2021-04-17 | Stop reason: HOSPADM

## 2021-04-16 RX ORDER — METHYLPREDNISOLONE ACETATE 80 MG/ML
80 INJECTION, SUSPENSION INTRA-ARTICULAR; INTRALESIONAL; INTRAMUSCULAR; SOFT TISSUE ONCE
Status: COMPLETED | OUTPATIENT
Start: 2021-04-16 | End: 2021-04-16

## 2021-04-16 RX ORDER — FENTANYL CITRATE 50 UG/ML
50 INJECTION, SOLUTION INTRAMUSCULAR; INTRAVENOUS AS NEEDED
Status: DISCONTINUED | OUTPATIENT
Start: 2021-04-16 | End: 2021-04-17 | Stop reason: HOSPADM

## 2021-04-16 RX ADMIN — METHYLPREDNISOLONE ACETATE 80 MG: 80 INJECTION, SUSPENSION INTRA-ARTICULAR; INTRALESIONAL; INTRAMUSCULAR; SOFT TISSUE at 08:33

## 2021-04-16 NOTE — H&P
INTERVAL HISTORY:    The patient returns for another Lumbar epidural steroid injection today.  He received what he feels is 100% relief for the past 9 months but attributes a lot of that to inactivity during the Covid shutdown.  Nonetheless he did receive excellent relief.  His pain is now returned with a 6 out of 10 level.  He is in physical therapy in addition to Tylenol for conservative therapy.  Current Outpatient Medications on File Prior to Encounter   Medication Sig Dispense Refill   • acetaminophen (TYLENOL) 325 MG tablet Take 650 mg by mouth Every 6 (Six) Hours As Needed for Mild Pain .     • amLODIPine (NORVASC) 10 MG tablet Take 1 tablet by mouth Daily. 90 tablet 3   • Blood Gluc Meter Disp-Strips device Pt to monitor blood glucose two times daily 1 each 0   • buPROPion XL (WELLBUTRIN XL) 300 MG 24 hr tablet Take 1 tablet by mouth Daily. 90 tablet 3   • Cholecalciferol (VITAMIN D-3) 1000 UNITS capsule Take 1,000 Units by mouth Daily.     • ferrous gluconate (FERGON) 240 (27 FE) MG tablet 480 mg.     • fluticasone (FLONASE) 50 MCG/ACT nasal spray 2 sprays into each nostril Daily. 15.8 mL 0   • lisinopril (PRINIVIL,ZESTRIL) 10 MG tablet Take 1 tablet by mouth Daily. 90 tablet 3   • loratadine (CLARITIN) 10 MG tablet 10 mg.     • omeprazole (priLOSEC) 40 MG capsule TAKE ONE CAPSULE BY MOUTH DAILY 90 capsule 2   • pioglitazone (ACTOS) 45 MG tablet Take 1 tablet by mouth Daily. 90 tablet 3   • simvastatin (ZOCOR) 20 MG tablet Take 1 tablet by mouth Daily. 90 tablet 3   • traMADol (ULTRAM) 50 MG tablet      • vitamin B-12 (CYANOCOBALAMIN) 1000 MCG tablet Take 1,000 mcg by mouth Daily.     • vitamin C (ASCORBIC ACID) 500 MG tablet Take 500 mg by mouth Daily.     • aspirin 81 MG EC tablet Take 81 mg by mouth Daily.       No current facility-administered medications on file prior to encounter.       Past Medical History:   Diagnosis Date   • Anemia    • Anxiety    • Arthritis    • Chronic kidney disease     CKD  "stage 3   • Coronary artery disease     rheumatic fever at age 7   • Diabetes mellitus (CMS/HCC)    • GERD (gastroesophageal reflux disease)    • GI bleed    • Hyperlipidemia    • Hypertension    • Low back pain    • Peripheral neuropathy    • Rheumatic fever    • Spinal stenosis, lumbar        No hematologic infectious or constitutional symptoms  Negative screen for MARTINEZ      Exam:  /74 (BP Location: Left arm, Patient Position: Sitting)   Pulse 58   Temp 36.7 °C (98 °F) (Oral)   Resp 16   Ht 176.5 cm (69.5\")   Wt 110 kg (242 lb)   SpO2 96%   BMI 35.22 kg/m²   Airway Mallampatti 2  Alert and oriented      Diagnosis:  Post-Op Diagnosis Codes:     * Spinal stenosis of lumbar region with neurogenic claudication [M48.062]    Plan:  Lumbar 4 epidural steroid injection under fluoroscopic guidance    I have encouraged them to continue:  1.  Physical therapy exercises at home as prescribed by physical therapy or from the pain clinic handout (given to the patient).  Continuation of these exercises every day, or multiple times per week, even when the patient has good pain relief, was stressed to the patient as a preventative measure to decrease the frequency and severity of future pain episodes.  2.  Continue pain medicines as already prescribed.  If patient not currently taking any, it is recommended to begin Acetaminophen 1000 mg po q 8 hours.  If other medicines containing Acetaminophen are currently prescribed, maintain daily dose at 3000mg.    3.  If they can tolerate NSAIDS, it is recommended to take Ibuprofen 600 mg po q 6 hours for 7 days during pain exacerbations.   Alternatively, they may substitute an NSAID of their choice (e.g. Aleve)  4.  Heat and ice to the affected area as tolerated for pain control.  It was discussed that heating pads can cause burns.  5.  Low impact exercise such as walking or water exercise was recommended to maintain overall health and aid in weight control.   6.  Follow up as " needed for subsequent injections.  7.  Patient was counseled to abstain from tobacco products.

## 2021-04-16 NOTE — ANESTHESIA PROCEDURE NOTES
PAIN Epidural block      Patient reassessed immediately prior to procedure    Patient location during procedure: pain clinic  Indication:procedure for pain  Performed By  Anesthesiologist: Isaac Carr MD  Preanesthetic Checklist  Completed: patient identified and risks and benefits discussed  Additional Notes  Diagnosis:     Post-Op Diagnosis Codes:     * Spinal stenosis of lumbar region with neurogenic claudication (M48.062)    Sedation:  None    No dye secondary to chronic kidney disease    A lumbar epidural steroid injection with fluoroscopic guidance was performed.  Under fluoroscopic guidance, the epidural space was identified and accessed, confirmed by loss of resistance to saline.  The above medications were injected uneventfully.    Prep:  Pt Position:prone  Sterile Tech:cap, gloves, mask and sterile barrier  Prep:chlorhexidine gluconate and isopropyl alcohol  Monitoring:blood pressure monitoring, continuous pulse oximetry and EKG  Procedure:Sedation: no     Approach:right paramedian  Guidance: fluoroscopy  Location:lumbar  Level:4-5  Needle Type:Tuohy  Needle Gauge:20  Aspiration:negative  Medications:  Depomedrol:80  Preservative Free Saline:1mL    Post Assessment:  Pt Tolerance:patient tolerated the procedure well with no apparent complications  Complications:no

## 2021-08-06 ENCOUNTER — ANESTHESIA EVENT (OUTPATIENT)
Dept: PAIN MEDICINE | Facility: HOSPITAL | Age: 76
End: 2021-08-06

## 2021-08-06 ENCOUNTER — HOSPITAL ENCOUNTER (OUTPATIENT)
Dept: PAIN MEDICINE | Facility: HOSPITAL | Age: 76
Discharge: HOME | End: 2021-08-06

## 2021-08-06 ENCOUNTER — HOSPITAL ENCOUNTER (OUTPATIENT)
Dept: GENERAL RADIOLOGY | Facility: HOSPITAL | Age: 76
Discharge: HOME | End: 2021-08-06

## 2021-08-06 ENCOUNTER — ANESTHESIA (OUTPATIENT)
Dept: PAIN MEDICINE | Facility: HOSPITAL | Age: 76
End: 2021-08-06

## 2021-08-06 VITALS
RESPIRATION RATE: 16 BRPM | HEART RATE: 58 BPM | TEMPERATURE: 97.8 F | DIASTOLIC BLOOD PRESSURE: 69 MMHG | OXYGEN SATURATION: 98 % | SYSTOLIC BLOOD PRESSURE: 153 MMHG

## 2021-08-06 DIAGNOSIS — M51.36 DDD (DEGENERATIVE DISC DISEASE), LUMBAR: Primary | ICD-10-CM

## 2021-08-06 DIAGNOSIS — R52 PAIN: ICD-10-CM

## 2021-08-06 PROCEDURE — 77003 FLUOROGUIDE FOR SPINE INJECT: CPT

## 2021-08-06 PROCEDURE — 25010000002 METHYLPREDNISOLONE PER 80 MG: Performed by: ANESTHESIOLOGY

## 2021-08-06 RX ORDER — LIDOCAINE HYDROCHLORIDE 10 MG/ML
1 INJECTION, SOLUTION INFILTRATION; PERINEURAL ONCE
Status: DISCONTINUED | OUTPATIENT
Start: 2021-08-06 | End: 2021-08-07 | Stop reason: HOSPADM

## 2021-08-06 RX ORDER — FENTANYL CITRATE 50 UG/ML
50 INJECTION, SOLUTION INTRAMUSCULAR; INTRAVENOUS AS NEEDED
Status: DISCONTINUED | OUTPATIENT
Start: 2021-08-06 | End: 2021-08-07 | Stop reason: HOSPADM

## 2021-08-06 RX ORDER — MIDAZOLAM HYDROCHLORIDE 1 MG/ML
1 INJECTION INTRAMUSCULAR; INTRAVENOUS
Status: DISCONTINUED | OUTPATIENT
Start: 2021-08-06 | End: 2021-08-07 | Stop reason: HOSPADM

## 2021-08-06 RX ORDER — METHYLPREDNISOLONE ACETATE 80 MG/ML
80 INJECTION, SUSPENSION INTRA-ARTICULAR; INTRALESIONAL; INTRAMUSCULAR; SOFT TISSUE ONCE
Status: COMPLETED | OUTPATIENT
Start: 2021-08-06 | End: 2021-08-06

## 2021-08-06 RX ADMIN — METHYLPREDNISOLONE ACETATE 80 MG: 80 INJECTION, SUSPENSION INTRA-ARTICULAR; INTRALESIONAL; INTRAMUSCULAR; SOFT TISSUE at 12:54

## 2021-08-06 NOTE — ANESTHESIA PROCEDURE NOTES
PAIN Epidural block      Patient reassessed immediately prior to procedure    Patient location during procedure: pain clinic  Indication:procedure for pain  Performed By  Anesthesiologist: Ike Montenegro MD  Preanesthetic Checklist  Completed: patient identified, site marked, risks and benefits discussed, surgical consent, monitors and equipment checked, pre-op evaluation and timeout performed  Additional Notes  Depomedrol - 80mg    Needle position confirmed by fluoroscopy. No contrast due to renal function.    Diagnosis  Post-Op Diagnosis Codes:     * Lumbar radiculopathy (M54.16)     * Lumbar spinal stenosis (M48.061)    Prep:  Pt Position:prone  Sterile Tech:cap, gloves, mask and sterile barrier  Prep:chlorhexidine gluconate and isopropyl alcohol  Monitoring:blood pressure monitoring, continuous pulse oximetry and EKG  Procedure:Sedation: no     Approach:right paramedian  Guidance: fluoroscopy  Location:lumbar  Level:4-5  Needle Type:Tuohy  Needle Gauge:20  Aspiration:negative  Medications:  Depomedrol:80 mg  Preservative Free Saline:2mL  Isovue:0mL    Post Assessment:  Post-procedure: bandaide.  Pt Tolerance:patient tolerated the procedure well with no apparent complications  Complications:no

## 2021-08-06 NOTE — H&P
[facility]    History and Physical    Patient Name: Danyel Dash  :  1945  MRN:  [id number]  Date of Admission: 2021    Subjective     Patient is a 75 y.o. male presents with chief complaint of chronic, intermitent, moderate low back and leg: bilateral pain.  Onset of symptoms was gradual starting a few months ago.  Symptoms are associated/aggravated by activity, exercise or twisting. Symptoms improve with rest    The following portions of the patients history were reviewed and updated as appropriate: current medications, allergies, past medical history, past surgical history, past family history, past social history and problem list                Objective     Past Medical History:   Past Medical History:   Diagnosis Date   • Anemia    • Anxiety    • Arthritis    • Chronic kidney disease     CKD stage 3   • Coronary artery disease     rheumatic fever at age 7   • Diabetes mellitus (CMS/HCC)    • GERD (gastroesophageal reflux disease)    • GI bleed    • Hyperlipidemia    • Hypertension    • Low back pain    • Peripheral neuropathy    • Rheumatic fever    • Spinal stenosis, lumbar      Past Surgical History:   Past Surgical History:   Procedure Laterality Date   • CATARACT EXTRACTION Bilateral    • COLONOSCOPY N/A 2016    tics, IH, polyps   • ENDOSCOPY N/A 2016    LA Grade B reflux esophagitis, erythematous mucosa in stomach   • ENDOSCOPY N/A 2017    Two jejunal AVM's    • ENTEROSCOPY SMALL BOWEL  2017    Procedure: ENTEROSCOPY SMALL BOWEL;  Surgeon: Anil Prakash MD;  Location: [facility] ENDOSCOPY;  Service:    • EPIDURAL BLOCK     • KNEE ARTHROPLASTY, PARTIAL REPLACEMENT Left 2021   • KNEE ARTHROSCOPY Right    • SHOULDER ARTHROSCOPY Right    • TONSILLECTOMY       Family History:   Family History   Problem Relation Age of Onset   • Diverticulosis Father    • Diverticulitis Father    • Alzheimer's disease Mother    • No Known Problems Sister      Social History:   Social  History     Socioeconomic History   • Marital status:      Spouse name: Lea   • Number of children: 3   • Years of education: Not on file   • Highest education level: Not on file   Tobacco Use   • Smoking status: Former Smoker     Packs/day: 1.50     Years: 18.00     Pack years: 27.00     Types: Cigarettes     Quit date:      Years since quittin.6   • Smokeless tobacco: Former User   • Tobacco comment: quit    Substance and Sexual Activity   • Alcohol use: No   • Drug use: No   • Sexual activity: Defer       Vital Signs Range for the last 24 hours  Temperature: Temp:  [36.6 °C (97.8 °F)] 36.6 °C (97.8 °F)   Temp Source: Temp src: Oral   BP: BP: (154)/(69) 154/69   Pulse: Heart Rate:  [59] 59   Respirations: Resp:  [16] 16   SPO2: SpO2:  [96 %] 96 %   O2 Amount (l/min):     O2 Devices Device (Oxygen Therapy): room air   Weight:           --------------------------------------------------------------------------------    Current Outpatient Medications   Medication Sig Dispense Refill   • acetaminophen (TYLENOL) 325 MG tablet Take 650 mg by mouth Every 6 (Six) Hours As Needed for Mild Pain .     • amLODIPine (NORVASC) 10 MG tablet Take 1 tablet by mouth Daily. 90 tablet 3   • buPROPion XL (WELLBUTRIN XL) 300 MG 24 hr tablet Take 1 tablet by mouth Daily. 90 tablet 3   • Cholecalciferol (VITAMIN D-3) 1000 UNITS capsule Take 1,000 Units by mouth Daily.     • ferrous gluconate (FERGON) 240 (27 FE) MG tablet 480 mg.     • fluticasone (FLONASE) 50 MCG/ACT nasal spray 2 sprays into each nostril Daily. 15.8 mL 0   • lisinopril (PRINIVIL,ZESTRIL) 10 MG tablet Take 1 tablet by mouth Daily. 90 tablet 3   • loratadine (CLARITIN) 10 MG tablet 10 mg.     • omeprazole (priLOSEC) 40 MG capsule TAKE ONE CAPSULE BY MOUTH DAILY 90 capsule 2   • pioglitazone (ACTOS) 45 MG tablet Take 1 tablet by mouth Daily. 90 tablet 3   • simvastatin (ZOCOR) 20 MG tablet Take 1 tablet by mouth Daily. 90 tablet 3   • traMADol  (ULTRAM) 50 MG tablet      • vitamin B-12 (CYANOCOBALAMIN) 1000 MCG tablet Take 1,000 mcg by mouth Daily.     • vitamin C (ASCORBIC ACID) 500 MG tablet Take 500 mg by mouth Daily.     • aspirin 81 MG EC tablet Take 81 mg by mouth Daily.     • Blood Gluc Meter Disp-Strips device Pt to monitor blood glucose two times daily 1 each 0     No current facility-administered medications for this encounter.       --------------------------------------------------------------------------------  Assessment/Plan      Anesthesia Evaluation     Patient summary reviewed and Nursing notes reviewed   NPO Solid Status: > 8 hours  NPO Liquid Status: > 2 hours    Pain impairs ability to perform ADLs: Sleeping  Modalities previously tried to control pain with limited effectiveness within the last 4-6 weeks: Rest     Airway   Mallampati: II  TM distance: >3 FB  Neck ROM: full  Dental - normal exam     Pulmonary - normal exam   (+) a smoker Former,   Cardiovascular - normal exam    (+) hypertension, CAD, hyperlipidemia,       Neuro/Psych- neuro exam normal  (+) numbness, psychiatric history,     GI/Hepatic/Renal/Endo    (+)  GERD, GI bleeding resolved, renal disease CRI, diabetes mellitus,     Musculoskeletal (-) normal exam    (+) back pain, radiculopathy  Abdominal  - normal exam   Substance History - negative use     OB/GYN negative ob/gyn ROS         Other   arthritis,                 Diagnosis and Plan    Treatment Plan  ASA 3      Procedures: Lumbar Epidural Steroid Injection(LESI), With fluoroscopy,       Anesthetic plan and risks discussed with patient.      Target L4-5    Diagnosis     * Lumbar radiculopathy [M54.16]     * Lumbar spinal stenosis [M48.061]

## 2021-10-08 ENCOUNTER — OFFICE VISIT (OUTPATIENT)
Dept: FAMILY MEDICINE CLINIC | Facility: CLINIC | Age: 76
End: 2021-10-08

## 2021-10-08 VITALS
BODY MASS INDEX: 35.87 KG/M2 | DIASTOLIC BLOOD PRESSURE: 62 MMHG | TEMPERATURE: 98 F | HEIGHT: 69 IN | WEIGHT: 242.2 LBS | SYSTOLIC BLOOD PRESSURE: 130 MMHG | HEART RATE: 70 BPM | OXYGEN SATURATION: 98 %

## 2021-10-08 DIAGNOSIS — K21.9 GASTROESOPHAGEAL REFLUX DISEASE: ICD-10-CM

## 2021-10-08 DIAGNOSIS — F41.9 ANXIETY: ICD-10-CM

## 2021-10-08 DIAGNOSIS — I10 ESSENTIAL HYPERTENSION: ICD-10-CM

## 2021-10-08 DIAGNOSIS — K21.9 GASTROESOPHAGEAL REFLUX DISEASE, UNSPECIFIED WHETHER ESOPHAGITIS PRESENT: ICD-10-CM

## 2021-10-08 DIAGNOSIS — E78.01 FAMILIAL HYPERCHOLESTEROLEMIA: ICD-10-CM

## 2021-10-08 DIAGNOSIS — E11.9 TYPE 2 DIABETES MELLITUS WITHOUT COMPLICATION, WITHOUT LONG-TERM CURRENT USE OF INSULIN (HCC): Primary | ICD-10-CM

## 2021-10-08 DIAGNOSIS — K57.90 DIVERTICULOSIS: ICD-10-CM

## 2021-10-08 LAB
BASOPHILS # BLD AUTO: 0.06 10*3/MM3 (ref 0–0.2)
BASOPHILS NFR BLD AUTO: 1 % (ref 0–1.5)
EOSINOPHIL # BLD AUTO: 0.13 10*3/MM3 (ref 0–0.4)
EOSINOPHIL NFR BLD AUTO: 2.1 % (ref 0.3–6.2)
ERYTHROCYTE [DISTWIDTH] IN BLOOD BY AUTOMATED COUNT: 12.2 % (ref 12.3–15.4)
EXPIRATION DATE: ABNORMAL
HBA1C MFR BLD: 6.5 %
HCT VFR BLD AUTO: 41 % (ref 37.5–51)
HGB BLD-MCNC: 13.7 G/DL (ref 13–17.7)
IMM GRANULOCYTES # BLD AUTO: 0.04 10*3/MM3 (ref 0–0.05)
IMM GRANULOCYTES NFR BLD AUTO: 0.7 % (ref 0–0.5)
LYMPHOCYTES # BLD AUTO: 1.04 10*3/MM3 (ref 0.7–3.1)
LYMPHOCYTES NFR BLD AUTO: 17.1 % (ref 19.6–45.3)
Lab: ABNORMAL
MCH RBC QN AUTO: 31 PG (ref 26.6–33)
MCHC RBC AUTO-ENTMCNC: 33.4 G/DL (ref 31.5–35.7)
MCV RBC AUTO: 92.8 FL (ref 79–97)
MONOCYTES # BLD AUTO: 0.46 10*3/MM3 (ref 0.1–0.9)
MONOCYTES NFR BLD AUTO: 7.6 % (ref 5–12)
NEUTROPHILS # BLD AUTO: 4.35 10*3/MM3 (ref 1.7–7)
NEUTROPHILS NFR BLD AUTO: 71.5 % (ref 42.7–76)
NRBC BLD AUTO-RTO: 0 /100 WBC (ref 0–0.2)
PLATELET # BLD AUTO: 326 10*3/MM3 (ref 140–450)
RBC # BLD AUTO: 4.42 10*6/MM3 (ref 4.14–5.8)
WBC # BLD AUTO: 6.08 10*3/MM3 (ref 3.4–10.8)

## 2021-10-08 PROCEDURE — 3044F HG A1C LEVEL LT 7.0%: CPT | Performed by: NURSE PRACTITIONER

## 2021-10-08 PROCEDURE — 99214 OFFICE O/P EST MOD 30 MIN: CPT | Performed by: NURSE PRACTITIONER

## 2021-10-08 PROCEDURE — 83036 HEMOGLOBIN GLYCOSYLATED A1C: CPT | Performed by: NURSE PRACTITIONER

## 2021-10-08 RX ORDER — HYDROXYZINE HYDROCHLORIDE 25 MG/1
25 TABLET, FILM COATED ORAL 3 TIMES DAILY PRN
Qty: 90 TABLET | Refills: 0 | Status: SHIPPED | OUTPATIENT
Start: 2021-10-08 | End: 2021-10-12

## 2021-10-08 RX ORDER — OMEPRAZOLE 40 MG/1
40 CAPSULE, DELAYED RELEASE ORAL DAILY
Qty: 90 CAPSULE | Refills: 3 | Status: SHIPPED | OUTPATIENT
Start: 2021-10-08 | End: 2021-12-03

## 2021-10-08 NOTE — PROGRESS NOTES
"Chief Complaint  Diabetes (Patient is needing his a1c checked ( wore mask and goggles) ) and Rectal Bleeding (Patient sees a GI specialist for this issue but is wondering if he should have his hemoglobin levels checked )    Subjective          Danyel Dash presents to [facility]  History of Present Illness  Diabetes-patient is currently on Actos 45 mg once a day as directed without any side effects.  A1c was last checked in April 2021 with result of 6.2.    Hyperlipidemia-patient is currently on simvastatin 20 mg daily as directed without any side effects.  Cholesterol was last checked in April 2021 with a total cholesterol of 116 and LDL of 97.    Hypertension-patient is currently on amlodipine 10 mg daily, lisinopril 10 mg daily as directed without any side effects.  Blood pressures well controlled in the office today.    GERD-patient is currently on omeprazole 40 mg daily as directed without any side effects not experiencing any symptoms of acid reflux at this time.    Anxiety-patient is currently on Wellbutrin  mg daily as directed without any side effects but not controlling anxiety. Daughter causing issues at home.  Always feels anxious    Objective   Vital Signs:   /62   Pulse 70   Temp 98 °F (36.7 °C)   Ht 176.5 cm (69.49\")   Wt 110 kg (242 lb 3.2 oz)   SpO2 98%   BMI 35.27 kg/m²     Physical Exam  Vitals reviewed.   Constitutional:       General: He is not in acute distress.     Appearance: He is well-developed.   HENT:      Head: Normocephalic.   Cardiovascular:      Rate and Rhythm: Normal rate and regular rhythm.      Heart sounds: Normal heart sounds.   Pulmonary:      Effort: Pulmonary effort is normal.      Breath sounds: Normal breath sounds.   Neurological:      Mental Status: He is alert and oriented to person, place, and time.      Gait: Gait normal.   Psychiatric:         Behavior: Behavior normal.         Thought Content: Thought content " Trude Oppenheim is a 65 y.o. female evaluated via telephone on 8/4/2021. Consent:  She and/or health care decision maker is aware that that she may receive a bill for this telephone service, depending on her insurance coverage, and has provided verbal consent to proceed: Yes      Documentation:  I communicated with the patient and/or health care decision maker about micro hematuria, hx of IC and UTIs. Details of this discussion including any medical advice provided:           [address] [place] [address]  Dept: [phone]  Loc: [phone]    Visit Date: 8/4/2021        HPI:     Trude Oppenheim is a 65 y.o. female who presents today for:  Chief Complaint   Patient presents with    1 Year Follow Up       HPI   Pt seen in follow up for incomplete bladder emptying, chronic cystitis, hx IC, hx micro hematuria. Pt initially referred to our office 9/2019 secondary to micro hematuria. Has noted hx of previously diagnosed IC, recurrent UTIs. CTU 9/30/2019: negative for hydronephrosis or renal calculi. No acute urologic findings. Noted slightly bulky appearance of the uterus. Cystoscopy 10/16/2019 by Dr. Francisco Walton". She was started on Tamsulosin 0.4 mg nightly with improvement in obstructive symptoms. She is here today in follow-up. She reports her urinary symptoms are \"pretty good\" at this time and she is happy with the improvement she has experienced with the Flomax. No gross hematuria, dysuria. No new abdominal or back pain. Notes if she forgets to take the Flomax she notices a difference. No recent infections.     Current Outpatient Medications   Medication Sig Dispense Refill    naproxen sodium (ALEVE) 220 MG tablet Take 220 mg by mouth 2 times daily (with meals)      mycophenolate (CELLCEPT) 500 MG tablet TAKE 3 TABLETS BY MOUTH EVERY MORNING AND TAKE 3 TABLETS BY MOUTH EVERY EVENING      normal.         Judgment: Judgment normal.        Result Review :   The following data was reviewed by: JOHANA Reza on 10/08/2021:  CMP    CMP 4/8/21   Glucose 193 (A)   BUN 19   Creatinine 1.11   eGFR Non  Am 65   eGFR African Am 78   Sodium 139   Potassium 4.7   Chloride 104   Calcium 9.7   Total Protein 6.1   Albumin 4.20   Globulin 1.9   Total Bilirubin 0.6   Alkaline Phosphatase 49   AST (SGOT) 15   ALT (SGPT) 11   (A) Abnormal value       Comments are available for some flowsheets but are not being displayed.           CBC w/diff    CBC w/Diff 4/8/21   WBC 5.90   RBC 4.46   Hemoglobin 13.7   Hematocrit 41.5   MCV 93.0   MCH 30.7   MCHC 33.0   RDW 13.0   Platelets 350   Neutrophil Rel % 75.4   Lymphocyte Rel % 15.1 (A)   Monocyte Rel % 6.9   Eosinophil Rel % 1.4   Basophil Rel % 0.7   (A) Abnormal value            Lipid Panel    Lipid Panel 4/8/21   Total Cholesterol 160   Triglycerides 137   HDL Cholesterol 39 (A)   VLDL Cholesterol 24   LDL Cholesterol  97   LDL/HDL Ratio 2.40   (A) Abnormal value       Comments are available for some flowsheets but are not being displayed.               Most Recent A1C    HGBA1C Most Recent 10/8/21   Hemoglobin A1C 6.5           PSA    PSA 4/8/21   PSA 0.612                     Assessment and Plan    Diagnoses and all orders for this visit:    1. Type 2 diabetes mellitus without complication, without long-term current use of insulin (HCC) (Primary)  -     POC Glycosylated Hemoglobin (Hb A1C)    2. Essential hypertension    3. Familial hypercholesterolemia    4. Gastroesophageal reflux disease, unspecified whether esophagitis present    5. Anxiety  -     hydrOXYzine (ATARAX) 25 MG tablet; Take 1 tablet by mouth 3 (Three) Times a Day As Needed for Anxiety.  Dispense: 90 tablet; Refill: 0    6. Diverticulosis  -     CBC & Differential    7. Gastroesophageal reflux disease  -     omeprazole (priLOSEC) 40 MG capsule; Take 1 capsule by mouth Daily.  Dispense: 90  tamsulosin (FLOMAX) 0.4 MG capsule Take 1 capsule by mouth nightly 90 capsule 3    RITUXIMAB IV Infuse intravenously      Cholecalciferol (VITAMIN D3) 1000 units CAPS Take 2,000 Units by mouth every morning (before breakfast)      Magnesium ([employer] TRIPLE MAGNESIUM COMPLEX) 400 MG CAPS Take 1 tablet by mouth nightly      Levomefolate Glucosamine (METHYLFOLATE) 400 MCG CAPS Take 1 capsule by mouth every morning (before breakfast)      DHEA 10 MG TABS Take 5 mg by mouth every morning (before breakfast) [employer] or [employer]      acetaminophen (TYLENOL) 325 MG tablet Take 325 mg by mouth as needed      methotrexate (RHEUMATREX) 2.5 MG chemo tablet Take 8 tablets by mouth once a week  0    traZODone (DESYREL) 50 MG tablet Take 50 mg by mouth nightly as needed      B Complex-Folic Acid (BALANCED C-71 TR PO) Take 1 tablet by mouth 3 times daily (before meals) [employer], [employer]      traMADol (ULTRAM) 50 MG tablet Take 50 mg by mouth every 6 hours as needed for Pain. No current facility-administered medications for this visit. Past Medical History  Erika Wagner  has a past medical history of Fibromyalgia and Rheumatoid arthritis ([facility] [address] Past Surgical History  The patient  has a past surgical history that includes Bunionectomy (Left); Hammer toe surgery (Right); Tubal ligation; and Cataract removal (Bilateral). Family History  This patient's family history includes High Blood Pressure in her father and mother. Social History  Erika Wagner  reports that she quit smoking about 34 years ago. Her smoking use included cigarettes. She has a 0.20 pack-year smoking history. She has never used smokeless tobacco. She reports current alcohol use. She reports that she does not use drugs. Subjective:      Review of Systems   Constitutional: Negative for activity change, appetite change, chills, diaphoresis, fatigue, fever and unexpected weight change. Gastrointestinal: Negative for abdominal pain, nausea and vomiting. Genitourinary: Negative for decreased urine volume, difficulty urinating, dysuria, flank pain, frequency, hematuria and urgency. Musculoskeletal: Negative for back pain. Objective: There were no vitals taken for this visit. Physical Exam  Unable to attain as pt was seen via telephone visit. POC  No results found for this visit on 08/04/21. Patients recent PSA values are as follows  No results found for: PSA, PSADIA     Recent BUN/Creatinine:  No results found for: BUN, CREATININE        Assessment:   Urinary frequency/urgency  Micro hematuria  Hx IC  Hx recurrent UTIs    Plan:     Pt is doing well on current therapy with Tamsulosin 0.4 mg daily. Continue tamsulosin--refills sent. No recent infections or gross hematuria. Pt aware to notify the office for any hematuria or symptoms of infection. F/u in 1 year                   I affirm this is a Patient Initiated Episode with a Patient who has not had a related appointment within my department in the past 7 days or scheduled within the next 24 hours. Patient identification was verified at the start of the visit: Yes    Total Time: minutes: 5-10 minutes    The visit was conducted pursuant to the emergency declaration under the [facility], [address] [place] [address] authority and the [employer] and [employer] Act. Patient identification was verified, and a caregiver was present when appropriate. The patient was located in a state where the provider was credentialed to provide care.     Note: not billable if this call serves to triage the patient into an appointment for the relevant concern      JONELLE Newsome - BIRGIT capsule; Refill: 3        Follow Up   Return in about 6 months (around 4/8/2022) for Recheck.  Patient was given instructions and counseling regarding his condition or for health maintenance advice. Please see specific information pulled into the AVS if appropriate.     Continue same with medicine.  Follow-up after lab.  Add hydroxyzine as needed for anxiety return to the office if no improvement.  Return the office in 6 months for an A1c check    Mask and googles worn

## 2021-10-11 DIAGNOSIS — F41.9 ANXIETY: ICD-10-CM

## 2021-10-12 RX ORDER — HYDROXYZINE HYDROCHLORIDE 25 MG/1
TABLET, FILM COATED ORAL
Qty: 90 TABLET | Refills: 0 | Status: SHIPPED | OUTPATIENT
Start: 2021-10-12 | End: 2022-02-22

## 2021-11-12 ENCOUNTER — OFFICE VISIT (OUTPATIENT)
Dept: ORTHOPEDIC SURGERY | Facility: CLINIC | Age: 76
End: 2021-11-12

## 2021-11-12 VITALS — BODY MASS INDEX: 34.65 KG/M2 | TEMPERATURE: 98.4 F | HEIGHT: 70 IN | WEIGHT: 242 LBS

## 2021-11-12 DIAGNOSIS — M48.061 SPINAL STENOSIS OF LUMBAR REGION WITHOUT NEUROGENIC CLAUDICATION: Primary | ICD-10-CM

## 2021-11-12 DIAGNOSIS — R52 PAIN: ICD-10-CM

## 2021-11-12 DIAGNOSIS — M25.551 RIGHT HIP PAIN: ICD-10-CM

## 2021-11-12 PROCEDURE — 99213 OFFICE O/P EST LOW 20 MIN: CPT | Performed by: ORTHOPAEDIC SURGERY

## 2021-11-12 PROCEDURE — 73502 X-RAY EXAM HIP UNI 2-3 VIEWS: CPT | Performed by: ORTHOPAEDIC SURGERY

## 2021-11-12 PROCEDURE — 72100 X-RAY EXAM L-S SPINE 2/3 VWS: CPT | Performed by: ORTHOPAEDIC SURGERY

## 2021-11-12 NOTE — PROGRESS NOTES
New patient or new problem visit    CC: Right groin pain    HPI: His main pain complaint today is right groin pain. Before it was more back buttock and radiating leg pain which improved with epidurals but lately the groin pain is been added and that's not getting better. It is aggravated by riding a motorcycle. Pain does not radiate down the leg at this point. Today he is in no pain. Also has occasional left groin pain. In the past he has done extremely well with epidural steroid injections for buttock and leg pain. No bowel or bladder complaints  PFSH: See attached    ROS: As above    PE: On exam positive Stinchfield on the right some weakness of hip flexion on the right negative on the left. Otherwise good strength in lower extremities bilaterally. The back is nontender    XRAY: Plain film x-rays of the lumbar spine show well-maintained lordosis and mild degenerative changes. No change from prior films. Pelvic x-rays show minimal hip DJD bilaterally nothing too bad. No comparison views are available there. MRI from 2017 showed foraminal stenosis most notable at L5-S1 far laterally but nothing in the upper lumbar area.    Other: n/a    Impression: I don't think this is radicular pain in the hip and groin which is his worst pain. There is not a lot of degenerative change on x-ray but he could have labral issues, tendinitis etc.    Plan: Lets get Dr. Christiansen to evaluate him for intrinsic right hip pain. I can further evaluate the back with MRI and/or myelogram CT imaging but I don't want to put him through this if the pain is intrinsic to the hip as he has no intention of undergoing spine surgery unless it is a last resort.

## 2021-11-22 ENCOUNTER — OFFICE VISIT (OUTPATIENT)
Dept: ORTHOPEDIC SURGERY | Facility: CLINIC | Age: 76
End: 2021-11-22

## 2021-11-22 DIAGNOSIS — M70.61 GREATER TROCHANTERIC BURSITIS OF RIGHT HIP: Primary | ICD-10-CM

## 2021-11-22 PROCEDURE — 99213 OFFICE O/P EST LOW 20 MIN: CPT | Performed by: ORTHOPAEDIC SURGERY

## 2021-11-22 NOTE — PROGRESS NOTES
General Exam    Patient: Danyel Dash    YOB: 1945    Medical Record Number: [id number]    Chief Complaints: Right hip pain    History of Present Illness:     76 y.o. male patient who presents for evaluation treatment of right hip pain.  Patient is resistant my partner Dr. Sandhu.  Patient states she been having some lateral based hip pain no groin pain.  With him to get some bilateral groin pains after ride his motorcycle for 2 to 3 hours at a time but then resolves.  States he has difficulty laying on the right side at night and increased activity has lateral pain no trauma.    Denies any numbness or tingling.  Denies any fevers, cough or shortness of breath.    Allergies: No Known Allergies    Home Medications:      Current Outpatient Medications:   •  acetaminophen (TYLENOL) 325 MG tablet, Take 650 mg by mouth Every 6 (Six) Hours As Needed for Mild Pain ., Disp: , Rfl:   •  amLODIPine (NORVASC) 10 MG tablet, Take 1 tablet by mouth Daily., Disp: 90 tablet, Rfl: 3  •  aspirin 81 MG EC tablet, Take 81 mg by mouth Daily., Disp: , Rfl:   •  Blood Gluc Meter Disp-Strips device, Pt to monitor blood glucose two times daily, Disp: 1 each, Rfl: 0  •  buPROPion XL (WELLBUTRIN XL) 300 MG 24 hr tablet, Take 1 tablet by mouth Daily., Disp: 90 tablet, Rfl: 3  •  Cholecalciferol (VITAMIN D-3) 1000 UNITS capsule, Take 1,000 Units by mouth Daily., Disp: , Rfl:   •  ferrous gluconate (FERGON) 240 (27 FE) MG tablet, 480 mg., Disp: , Rfl:   •  fluticasone (FLONASE) 50 MCG/ACT nasal spray, 2 sprays into each nostril Daily., Disp: 15.8 mL, Rfl: 0  •  hydrOXYzine (ATARAX) 25 MG tablet, TAKE ONE TABLET BY MOUTH THREE TIMES A DAY AS NEEDED FOR ANXIETY., Disp: 90 tablet, Rfl: 0  •  lisinopril (PRINIVIL,ZESTRIL) 10 MG tablet, Take 1 tablet by mouth Daily., Disp: 90 tablet, Rfl: 3  •  loratadine (CLARITIN) 10 MG tablet, 10 mg., Disp: , Rfl:   •  omeprazole (priLOSEC) 40 MG capsule, Take 1 capsule by mouth Daily., Disp: 90  capsule, Rfl: 3  •  pioglitazone (ACTOS) 45 MG tablet, Take 1 tablet by mouth Daily., Disp: 90 tablet, Rfl: 3  •  simvastatin (ZOCOR) 20 MG tablet, Take 1 tablet by mouth Daily., Disp: 90 tablet, Rfl: 3  •  traMADol (ULTRAM) 50 MG tablet, , Disp: , Rfl:   •  vitamin B-12 (CYANOCOBALAMIN) 1000 MCG tablet, Take 1,000 mcg by mouth Daily., Disp: , Rfl:   •  vitamin C (ASCORBIC ACID) 500 MG tablet, Take 500 mg by mouth Daily., Disp: , Rfl:   •  Diclofenac Sodium (VOLTAREN) 1 % gel gel, , Disp: , Rfl:     Past Medical History:   Diagnosis Date   • Anemia    • Anxiety    • Arthritis    • Chronic kidney disease     CKD stage 3   • Coronary artery disease     rheumatic fever at age 7   • Diabetes mellitus (HCC)    • GERD (gastroesophageal reflux disease)    • GI bleed    • Hyperlipidemia    • Hypertension    • Low back pain    • Peripheral neuropathy    • Rheumatic fever    • Spinal stenosis, lumbar        Past Surgical History:   Procedure Laterality Date   • CATARACT EXTRACTION Bilateral    • COLONOSCOPY N/A 2016    tics, IH, polyps   • ENDOSCOPY N/A 2016    LA Grade B reflux esophagitis, erythematous mucosa in stomach   • ENDOSCOPY N/A 2017    Two jejunal AVM's    • ENTEROSCOPY SMALL BOWEL  2017    Procedure: ENTEROSCOPY SMALL BOWEL;  Surgeon: Anil Prakash MD;  Location: [facility];  Service:    • EPIDURAL BLOCK     • KNEE ARTHROPLASTY, PARTIAL REPLACEMENT Left 2021   • KNEE ARTHROSCOPY Right    • SHOULDER ARTHROSCOPY Right    • TONSILLECTOMY         Social History     Occupational History   • Occupation:      Employer: RETIRED   Tobacco Use   • Smoking status: Former Smoker     Packs/day: 1.50     Years: 18.00     Pack years: 27.00     Types: Cigarettes     Quit date:      Years since quittin.9   • Smokeless tobacco: Former User   • Tobacco comment: quit    Vaping Use   • Vaping Use: Never used   Substance and Sexual Activity   • Alcohol use: No   • Drug use: No    • Sexual activity: Defer      Social History     Social History Narrative   • Not on file       Family History   Problem Relation Age of Onset   • Diverticulosis Father    • Diverticulitis Father    • Alzheimer's disease Mother    • No Known Problems Sister        Review of Systems:      Constitutional: Denies fever, shaking or chills         All other pertinent positives and negatives as noted above in HPI.    Physical Exam: 76 y.o. male    There were no vitals filed for this visit.    General:  Patient is awake and alert.  Appears in no acute distress or discomfort.      Musculoskeletal/Extremities:    Right lower extremity: Is of tenderness palpation on the greater troches.  Hip range of motion full painless.  Negative Stinchfield.  Can stand from seat position ambulates normal gait unassisted.  Sensation and motor intact distally.  2+ pedal pulses.         Radiology:       Previous right hip films were reviewed to evaluate the patient's complaint/s.    Images show some mild degenerative changes hip joint no acute fractures noted.     No imaging for comparison.    Assessment: Right hip greater trochanteric bursitis      Plan:      Recommend conservative treatment this time.  Patient will start with some anti-inflammatory gel as he cannot tolerate oral anti-inflammatories as he is a diabetic and on medication.  Also advised for activity modification with therapy build him back.  Things not progressing next 3 to 4 weeks we can always do an injection at that time but would hold currently.           We will plan for follow up as needed.    All questions were answered.  Patient understands and agrees with the plan.    Isaac Grissom MD    11/22/2021    CC to Dereje Loya APRN

## 2021-12-02 ENCOUNTER — APPOINTMENT (OUTPATIENT)
Dept: GENERAL RADIOLOGY | Facility: HOSPITAL | Age: 76
End: 2021-12-02

## 2021-12-02 ENCOUNTER — HOSPITAL ENCOUNTER (EMERGENCY)
Facility: HOSPITAL | Age: 76
Discharge: LEFT BEFORE DISCHARGE | End: 2021-12-02

## 2021-12-02 VITALS
HEART RATE: 104 BPM | SYSTOLIC BLOOD PRESSURE: 143 MMHG | TEMPERATURE: 98.6 F | DIASTOLIC BLOOD PRESSURE: 61 MMHG | RESPIRATION RATE: 17 BRPM | OXYGEN SATURATION: 99 %

## 2021-12-02 LAB
ALBUMIN SERPL-MCNC: 4.4 G/DL (ref 3.5–5.2)
ALBUMIN/GLOB SERPL: 1.8 G/DL
ALP SERPL-CCNC: 60 U/L (ref 39–117)
ALT SERPL W P-5'-P-CCNC: 12 U/L (ref 1–41)
ANION GAP SERPL CALCULATED.3IONS-SCNC: 11.6 MMOL/L (ref 5–15)
AST SERPL-CCNC: 17 U/L (ref 1–40)
BILIRUB SERPL-MCNC: 0.7 MG/DL (ref 0–1.2)
BUN SERPL-MCNC: 22 MG/DL (ref 8–23)
BUN/CREAT SERPL: 20.6 (ref 7–25)
CALCIUM SPEC-SCNC: 8.9 MG/DL (ref 8.6–10.5)
CHLORIDE SERPL-SCNC: 101 MMOL/L (ref 98–107)
CO2 SERPL-SCNC: 23.4 MMOL/L (ref 22–29)
CREAT SERPL-MCNC: 1.07 MG/DL (ref 0.76–1.27)
DEPRECATED RDW RBC AUTO: 43.1 FL (ref 37–54)
ERYTHROCYTE [DISTWIDTH] IN BLOOD BY AUTOMATED COUNT: 12.8 % (ref 12.3–15.4)
GFR SERPL CREATININE-BSD FRML MDRD: 67 ML/MIN/1.73
GLOBULIN UR ELPH-MCNC: 2.4 GM/DL
GLUCOSE SERPL-MCNC: 179 MG/DL (ref 65–99)
HCT VFR BLD AUTO: 42.9 % (ref 37.5–51)
HGB BLD-MCNC: 14.4 G/DL (ref 13–17.7)
HOLD SPECIMEN: NORMAL
HOLD SPECIMEN: NORMAL
LYMPHOCYTES # BLD MANUAL: 0.37 10*3/MM3 (ref 0.7–3.1)
LYMPHOCYTES NFR BLD MANUAL: 2.2 % (ref 5–12)
MCH RBC QN AUTO: 31.2 PG (ref 26.6–33)
MCHC RBC AUTO-ENTMCNC: 33.6 G/DL (ref 31.5–35.7)
MCV RBC AUTO: 92.9 FL (ref 79–97)
MONOCYTES # BLD: 0.25 10*3/MM3 (ref 0.1–0.9)
NEUTROPHILS # BLD AUTO: 10.58 10*3/MM3 (ref 1.7–7)
NEUTROPHILS NFR BLD MANUAL: 94.4 % (ref 42.7–76)
PLAT MORPH BLD: NORMAL
PLATELET # BLD AUTO: 282 10*3/MM3 (ref 140–450)
PMV BLD AUTO: 10.3 FL (ref 6–12)
POTASSIUM SERPL-SCNC: 4.5 MMOL/L (ref 3.5–5.2)
PROT SERPL-MCNC: 6.8 G/DL (ref 6–8.5)
QT INTERVAL: 338 MS
RBC # BLD AUTO: 4.62 10*6/MM3 (ref 4.14–5.8)
RBC MORPH BLD: NORMAL
SODIUM SERPL-SCNC: 136 MMOL/L (ref 136–145)
TROPONIN T SERPL-MCNC: <0.01 NG/ML (ref 0–0.03)
VARIANT LYMPHS NFR BLD MANUAL: 3.3 % (ref 19.6–45.3)
WBC MORPH BLD: NORMAL
WBC NRBC COR # BLD: 11.21 10*3/MM3 (ref 3.4–10.8)
WHOLE BLOOD HOLD SPECIMEN: NORMAL
WHOLE BLOOD HOLD SPECIMEN: NORMAL

## 2021-12-02 PROCEDURE — 85025 COMPLETE CBC W/AUTO DIFF WBC: CPT

## 2021-12-02 PROCEDURE — 99211 OFF/OP EST MAY X REQ PHY/QHP: CPT

## 2021-12-02 PROCEDURE — 84484 ASSAY OF TROPONIN QUANT: CPT

## 2021-12-02 PROCEDURE — 85007 BL SMEAR W/DIFF WBC COUNT: CPT

## 2021-12-02 PROCEDURE — 93010 ELECTROCARDIOGRAM REPORT: CPT | Performed by: INTERNAL MEDICINE

## 2021-12-02 PROCEDURE — 80053 COMPREHEN METABOLIC PANEL: CPT

## 2021-12-02 PROCEDURE — 93005 ELECTROCARDIOGRAM TRACING: CPT

## 2021-12-02 PROCEDURE — 71046 X-RAY EXAM CHEST 2 VIEWS: CPT

## 2021-12-02 RX ORDER — SODIUM CHLORIDE 0.9 % (FLUSH) 0.9 %
10 SYRINGE (ML) INJECTION AS NEEDED
Status: DISCONTINUED | OUTPATIENT
Start: 2021-12-02 | End: 2021-12-02 | Stop reason: HOSPADM

## 2021-12-02 RX ORDER — ASPIRIN 325 MG
325 TABLET ORAL ONCE
Status: DISCONTINUED | OUTPATIENT
Start: 2021-12-02 | End: 2021-12-02 | Stop reason: HOSPADM

## 2021-12-02 NOTE — ED TRIAGE NOTES
Patient came to triage and stated that he isn't going to wait any longer. Advised patient he is one of the next to go to a room but he doesn't want to wait. JANELLE DC'd

## 2021-12-02 NOTE — ED TRIAGE NOTES
Sent from [facility] for Chest discomfort onset yesterday AM. Denies other SX, states it isn't pain just discompfort . Patient had 81mg ASA at home this AM     Mask placed on patient in triage. Triage staff wore appropriate PPE during interaction with patient.

## 2021-12-03 ENCOUNTER — OFFICE VISIT (OUTPATIENT)
Dept: FAMILY MEDICINE CLINIC | Facility: CLINIC | Age: 76
End: 2021-12-03

## 2021-12-03 VITALS
WEIGHT: 241 LBS | SYSTOLIC BLOOD PRESSURE: 146 MMHG | DIASTOLIC BLOOD PRESSURE: 84 MMHG | HEIGHT: 69 IN | OXYGEN SATURATION: 98 % | TEMPERATURE: 98.6 F | BODY MASS INDEX: 35.7 KG/M2 | HEART RATE: 112 BPM

## 2021-12-03 DIAGNOSIS — K21.9 GASTROESOPHAGEAL REFLUX DISEASE, UNSPECIFIED WHETHER ESOPHAGITIS PRESENT: Primary | ICD-10-CM

## 2021-12-03 DIAGNOSIS — R10.9 ABDOMINAL PAIN, UNSPECIFIED ABDOMINAL LOCATION: ICD-10-CM

## 2021-12-03 DIAGNOSIS — R50.9 FEVER, UNSPECIFIED FEVER CAUSE: ICD-10-CM

## 2021-12-03 PROBLEM — M17.9 OSTEOARTHRITIS OF KNEE: Status: ACTIVE | Noted: 2020-04-24

## 2021-12-03 PROBLEM — I00 RHEUMATIC FEVER: Status: ACTIVE | Noted: 2021-12-03

## 2021-12-03 PROBLEM — Z87.39 H/O: GOUT: Status: ACTIVE | Noted: 2019-01-21

## 2021-12-03 PROBLEM — K57.32 DIVERTICULITIS OF COLON: Status: ACTIVE | Noted: 2021-12-03

## 2021-12-03 PROBLEM — S83.249A TEAR OF MEDIAL MENISCUS OF KNEE: Status: ACTIVE | Noted: 2020-11-27

## 2021-12-03 PROBLEM — J30.2 SEASONAL ALLERGIES: Status: ACTIVE | Noted: 2021-12-03

## 2021-12-03 PROBLEM — N19 RENAL FAILURE: Status: ACTIVE | Noted: 2021-12-03

## 2021-12-03 PROBLEM — M17.10 ARTHRITIS OF KNEE: Status: ACTIVE | Noted: 2019-01-21

## 2021-12-03 PROCEDURE — 74018 RADEX ABDOMEN 1 VIEW: CPT | Performed by: NURSE PRACTITIONER

## 2021-12-03 PROCEDURE — 99214 OFFICE O/P EST MOD 30 MIN: CPT | Performed by: NURSE PRACTITIONER

## 2021-12-03 RX ORDER — SUCRALFATE 1 G/1
1 TABLET ORAL 4 TIMES DAILY PRN
Qty: 28 TABLET | Refills: 0 | Status: SHIPPED | OUTPATIENT
Start: 2021-12-03 | End: 2021-12-08 | Stop reason: HOSPADM

## 2021-12-03 RX ORDER — PANTOPRAZOLE SODIUM 40 MG/1
40 TABLET, DELAYED RELEASE ORAL DAILY
Qty: 90 TABLET | Refills: 0 | Status: SHIPPED | OUTPATIENT
Start: 2021-12-03 | End: 2022-03-14

## 2021-12-03 NOTE — PROGRESS NOTES
Chief Complaint  Abdominal Pain (~days ago abdominal pain, went to  yesterday for chest pain.Vomited last night. Felt feverish.  )  Masks/face shield/appropriate PPE were worn for the entirety of the visit by the patient, MA, and provider.     Subjective          Danyel Dash presents to [facility]  History of Present Illness   Danyel Dash is a 76 y.o. male who presents to the clinic with his wife today to discuss complaints of abdominal pain. Patient states about 3 days ago he started to have severe gas pain. He denied nausea and vomiting. Yesterday, he developed centralized chest pain. Patient was evaluated in urgent care. According to the patient, it was thought he may have had a prior heart attack based on EKG findings, but patient had rheumatic fever at the age of 7. Patient was sent to the hospital. Repeat chest x-ray was performed that did not show MI. Troponin was negative. Patient waited 6 hours and left without being seen. Patient did have some vomiting yesterday. Today, he denies chest pain, but he does complain of abdominal distention and stomach pain. He also feels feverish. He has tried medications like simethicone as well as Pepto-Bismol. He had a bowel movement yesterday. He feels like he needs to pass gas, but does not pass much gas today or bulge. In discussing with patient if he had eaten any new foods, patient states that 3 days ago he did eat some deli ham that he was concerned may be . He also had beans and sausage as well. He does have gastroesophageal reflux disease and is on omeprazole 40 mg daily.    Review of Systems   Constitutional: Positive for fever.   HENT: Negative.    Eyes: Negative.    Respiratory: Negative.    Cardiovascular: Negative.    Gastrointestinal: Positive for abdominal pain, nausea and vomiting.   Endocrine: Negative.    Genitourinary: Negative.    Musculoskeletal: Negative.    Skin: Negative.    Allergic/Immunologic:  "Negative.    Neurological: Negative.    Hematological: Negative.    Psychiatric/Behavioral: Negative.      Objective   Vital Signs:   /84   Pulse 112   Temp 98.6 °F (37 °C)   Ht 175.3 cm (69\")   Wt 109 kg (241 lb)   SpO2 98%   BMI 35.59 kg/m²     Physical Exam  Vitals reviewed.   Constitutional:       General: He is not in acute distress.     Appearance: Normal appearance. He is obese. He is not ill-appearing, toxic-appearing or diaphoretic.   HENT:      Head: Normocephalic and atraumatic.   Eyes:      General: No scleral icterus.        Right eye: No discharge.         Left eye: No discharge.   Cardiovascular:      Rate and Rhythm: Regular rhythm. Tachycardia present.      Heart sounds: Normal heart sounds.   Abdominal:      General: Bowel sounds are increased. There is distension.      Palpations: Abdomen is soft. There is no mass.      Tenderness: There is no abdominal tenderness. There is no guarding or rebound.      Hernia: No hernia is present.   Musculoskeletal:      Right lower leg: No edema.      Left lower leg: No edema.   Neurological:      General: No focal deficit present.      Mental Status: He is alert and oriented to person, place, and time.      Motor: No weakness.      Gait: Gait normal.   Psychiatric:         Mood and Affect: Mood normal.         Behavior: Behavior normal.        Result Review :                 Assessment and Plan    Diagnoses and all orders for this visit:    1. Gastroesophageal reflux disease, unspecified whether esophagitis present (Primary)  -     pantoprazole (Protonix) 40 MG EC tablet; Take 1 tablet by mouth Daily.  Dispense: 90 tablet; Refill: 0  -     sucralfate (Carafate) 1 g tablet; Take 1 tablet by mouth 4 (Four) Times a Day As Needed (stomach pain) for up to 7 days.  Dispense: 28 tablet; Refill: 0    2. Fever, unspecified fever cause  -     COVID-19,[facility] ROUTINE, NP/OP SWAB IN TRANSPORT MEDIA OR ESWAB 72 HR TAT - Swab, Nasopharynx    3. Abdominal pain, " unspecified abdominal location  -     XR Abdomen KUB (In Office)      Patient presents today with complaints of stomach bloating and stomach pain. On physical examination, patient denies abdominal tenderness on palpation. He does complain of generalized stomach discomfort. Patient does have a history of diverticulosis, but denies lower abdominal or left lower quadrant pain. A KUB has been ordered today. Patient's symptoms are likely related to exacerbation of gastroesophageal reflux disease. Patient's omeprazole was discontinued and pantoprazole 40 mg p.o. daily has been ordered. Patient has also been prescribed Carafate 1 g 4 times daily prior to meals. Patient was advised not to take Carafate or Protonix together in separate dosing by at least 2 hours. Patient verbalized understanding. He was also encouraged to continue simethicone and advised to increase physical activity to help improve gas pain. Patient was also given exercises and body positions to help relieve gas. Patient was encouraged to continue to monitor his blood sugars as he has not been eating as much. Patient was advised to limit fried foods, saturated fats, and beans from his diet. Patient was encouraged to try a liquid diet or soft foods for the next 2 to 3 days. Patient advised to go to the hospital if abdominal pain worsens or if he is unable to have a bowel movement or pass gas, if he has persistent vomiting, or is unable to tolerate p.o. intake. Patient and wife verbalized understanding.    Follow Up   Return if symptoms worsen or fail to improve.  Patient was given instructions and counseling regarding his condition or for health maintenance advice. Please see specific information pulled into the AVS if appropriate.     Electronically signed by JOHANA Salvador, 12/03/21, 5:25 PM EST.

## 2021-12-05 ENCOUNTER — APPOINTMENT (OUTPATIENT)
Dept: CT IMAGING | Facility: HOSPITAL | Age: 76
End: 2021-12-05

## 2021-12-05 ENCOUNTER — APPOINTMENT (OUTPATIENT)
Dept: GENERAL RADIOLOGY | Facility: HOSPITAL | Age: 76
End: 2021-12-05

## 2021-12-05 ENCOUNTER — HOSPITAL ENCOUNTER (OUTPATIENT)
Facility: HOSPITAL | Age: 76
LOS: 1 days | Discharge: HOME | End: 2021-12-08
Attending: EMERGENCY MEDICINE | Admitting: HOSPITALIST

## 2021-12-05 DIAGNOSIS — K81.0 ACUTE CHOLECYSTITIS: Primary | ICD-10-CM

## 2021-12-05 DIAGNOSIS — K81.9 CHOLECYSTITIS: ICD-10-CM

## 2021-12-05 DIAGNOSIS — E87.1 HYPONATREMIA: ICD-10-CM

## 2021-12-05 DIAGNOSIS — K80.00 ACUTE CALCULOUS CHOLECYSTITIS: ICD-10-CM

## 2021-12-05 DIAGNOSIS — N28.9 RENAL LESION: ICD-10-CM

## 2021-12-05 LAB
ALBUMIN SERPL-MCNC: 3.6 G/DL (ref 3.5–5.2)
ALBUMIN/GLOB SERPL: 1.4 G/DL
ALP SERPL-CCNC: 54 U/L (ref 39–117)
ALT SERPL W P-5'-P-CCNC: 29 U/L (ref 1–41)
ANION GAP SERPL CALCULATED.3IONS-SCNC: 14.3 MMOL/L (ref 5–15)
AST SERPL-CCNC: 35 U/L (ref 1–40)
BASOPHILS # BLD AUTO: 0.05 10*3/MM3 (ref 0–0.2)
BASOPHILS NFR BLD AUTO: 0.4 % (ref 0–1.5)
BILIRUB SERPL-MCNC: 1.3 MG/DL (ref 0–1.2)
BUN SERPL-MCNC: 24 MG/DL (ref 8–23)
BUN/CREAT SERPL: 19.8 (ref 7–25)
CALCIUM SPEC-SCNC: 8.9 MG/DL (ref 8.6–10.5)
CHLORIDE SERPL-SCNC: 96 MMOL/L (ref 98–107)
CO2 SERPL-SCNC: 21.7 MMOL/L (ref 22–29)
CREAT SERPL-MCNC: 1.21 MG/DL (ref 0.76–1.27)
DEPRECATED RDW RBC AUTO: 39.9 FL (ref 37–54)
EOSINOPHIL # BLD AUTO: 0.08 10*3/MM3 (ref 0–0.4)
EOSINOPHIL NFR BLD AUTO: 0.7 % (ref 0.3–6.2)
ERYTHROCYTE [DISTWIDTH] IN BLOOD BY AUTOMATED COUNT: 12.5 % (ref 12.3–15.4)
GFR SERPL CREATININE-BSD FRML MDRD: 58 ML/MIN/1.73
GLOBULIN UR ELPH-MCNC: 2.6 GM/DL
GLUCOSE SERPL-MCNC: 102 MG/DL (ref 65–99)
HCT VFR BLD AUTO: 37.7 % (ref 37.5–51)
HGB BLD-MCNC: 13 G/DL (ref 13–17.7)
HOLD SPECIMEN: NORMAL
HOLD SPECIMEN: NORMAL
IMM GRANULOCYTES # BLD AUTO: 0.06 10*3/MM3 (ref 0–0.05)
IMM GRANULOCYTES NFR BLD AUTO: 0.5 % (ref 0–0.5)
LABCORP SARS-COV-2, NAA 2 DAY TAT: NORMAL
LIPASE SERPL-CCNC: 33 U/L (ref 13–60)
LYMPHOCYTES # BLD AUTO: 0.6 10*3/MM3 (ref 0.7–3.1)
LYMPHOCYTES NFR BLD AUTO: 5 % (ref 19.6–45.3)
MCH RBC QN AUTO: 30.9 PG (ref 26.6–33)
MCHC RBC AUTO-ENTMCNC: 34.5 G/DL (ref 31.5–35.7)
MCV RBC AUTO: 89.5 FL (ref 79–97)
MONOCYTES # BLD AUTO: 1.31 10*3/MM3 (ref 0.1–0.9)
MONOCYTES NFR BLD AUTO: 10.8 % (ref 5–12)
NEUTROPHILS NFR BLD AUTO: 10.01 10*3/MM3 (ref 1.7–7)
NEUTROPHILS NFR BLD AUTO: 82.6 % (ref 42.7–76)
NRBC BLD AUTO-RTO: 0 /100 WBC (ref 0–0.2)
PLATELET # BLD AUTO: 302 10*3/MM3 (ref 140–450)
PMV BLD AUTO: 9.1 FL (ref 6–12)
POTASSIUM SERPL-SCNC: 4.1 MMOL/L (ref 3.5–5.2)
PROT SERPL-MCNC: 6.2 G/DL (ref 6–8.5)
RBC # BLD AUTO: 4.21 10*6/MM3 (ref 4.14–5.8)
SARS-COV-2 RNA RESP QL NAA+PROBE: NOT DETECTED
SODIUM SERPL-SCNC: 132 MMOL/L (ref 136–145)
TROPONIN T SERPL-MCNC: <0.01 NG/ML (ref 0–0.03)
WBC NRBC COR # BLD: 12.11 10*3/MM3 (ref 3.4–10.8)
WHOLE BLOOD HOLD SPECIMEN: NORMAL
WHOLE BLOOD HOLD SPECIMEN: NORMAL

## 2021-12-05 PROCEDURE — 25010000002 IOPAMIDOL 61 % SOLUTION: Performed by: EMERGENCY MEDICINE

## 2021-12-05 PROCEDURE — 74177 CT ABD & PELVIS W/CONTRAST: CPT

## 2021-12-05 PROCEDURE — 99283 EMERGENCY DEPT VISIT LOW MDM: CPT

## 2021-12-05 PROCEDURE — 99284 EMERGENCY DEPT VISIT MOD MDM: CPT

## 2021-12-05 PROCEDURE — 83690 ASSAY OF LIPASE: CPT

## 2021-12-05 PROCEDURE — 96361 HYDRATE IV INFUSION ADD-ON: CPT

## 2021-12-05 PROCEDURE — 93005 ELECTROCARDIOGRAM TRACING: CPT | Performed by: EMERGENCY MEDICINE

## 2021-12-05 PROCEDURE — 71045 X-RAY EXAM CHEST 1 VIEW: CPT

## 2021-12-05 PROCEDURE — 85025 COMPLETE CBC W/AUTO DIFF WBC: CPT

## 2021-12-05 PROCEDURE — 84484 ASSAY OF TROPONIN QUANT: CPT | Performed by: EMERGENCY MEDICINE

## 2021-12-05 PROCEDURE — 80053 COMPREHEN METABOLIC PANEL: CPT

## 2021-12-05 PROCEDURE — 96365 THER/PROPH/DIAG IV INF INIT: CPT

## 2021-12-05 PROCEDURE — 36415 COLL VENOUS BLD VENIPUNCTURE: CPT

## 2021-12-05 RX ADMIN — IOPAMIDOL 85 ML: 612 INJECTION, SOLUTION INTRAVENOUS at 23:36

## 2021-12-06 ENCOUNTER — APPOINTMENT (OUTPATIENT)
Dept: GENERAL RADIOLOGY | Facility: HOSPITAL | Age: 76
End: 2021-12-06

## 2021-12-06 ENCOUNTER — ANESTHESIA (OUTPATIENT)
Dept: PERIOP | Facility: HOSPITAL | Age: 76
End: 2021-12-06

## 2021-12-06 ENCOUNTER — APPOINTMENT (OUTPATIENT)
Dept: ULTRASOUND IMAGING | Facility: HOSPITAL | Age: 76
End: 2021-12-06

## 2021-12-06 ENCOUNTER — ANESTHESIA EVENT (OUTPATIENT)
Dept: PERIOP | Facility: HOSPITAL | Age: 76
End: 2021-12-06

## 2021-12-06 PROBLEM — K80.00 ACUTE CALCULOUS CHOLECYSTITIS: Status: ACTIVE | Noted: 2021-12-06

## 2021-12-06 PROBLEM — N28.9 RENAL LESION: Status: ACTIVE | Noted: 2021-12-06

## 2021-12-06 PROBLEM — N18.2 CKD (CHRONIC KIDNEY DISEASE) STAGE 2, GFR 60-89 ML/MIN: Status: ACTIVE | Noted: 2021-12-06

## 2021-12-06 PROBLEM — K81.9 CHOLECYSTITIS: Status: ACTIVE | Noted: 2021-12-06

## 2021-12-06 LAB
D-LACTATE SERPL-SCNC: 1 MMOL/L (ref 0.5–2)
GLUCOSE BLDC GLUCOMTR-MCNC: 158 MG/DL (ref 70–130)
GLUCOSE BLDC GLUCOMTR-MCNC: 167 MG/DL (ref 70–130)
GLUCOSE BLDC GLUCOMTR-MCNC: 187 MG/DL (ref 70–130)
SARS-COV-2 RNA PNL SPEC NAA+PROBE: NOT DETECTED

## 2021-12-06 PROCEDURE — 63710000001 CHOLECALCIFEROL 25 MCG (1000 UT) TABLET: Performed by: NURSE PRACTITIONER

## 2021-12-06 PROCEDURE — A9270 NON-COVERED ITEM OR SERVICE: HCPCS | Performed by: NURSE PRACTITIONER

## 2021-12-06 PROCEDURE — 63710000001 OXYCODONE-ACETAMINOPHEN 7.5-325 MG TABLET: Performed by: ANESTHESIOLOGY

## 2021-12-06 PROCEDURE — 63710000001 ATORVASTATIN 20 MG TABLET: Performed by: NURSE PRACTITIONER

## 2021-12-06 PROCEDURE — 47562 LAPAROSCOPIC CHOLECYSTECTOMY: CPT | Performed by: SPECIALIST/TECHNOLOGIST, OTHER

## 2021-12-06 PROCEDURE — 82962 GLUCOSE BLOOD TEST: CPT

## 2021-12-06 PROCEDURE — 74300 X-RAY BILE DUCTS/PANCREAS: CPT

## 2021-12-06 PROCEDURE — 87635 SARS-COV-2 COVID-19 AMP PRB: CPT | Performed by: EMERGENCY MEDICINE

## 2021-12-06 PROCEDURE — 96361 HYDRATE IV INFUSION ADD-ON: CPT

## 2021-12-06 PROCEDURE — 25010000002 DEXAMETHASONE PER 1 MG: Performed by: ANESTHESIOLOGY

## 2021-12-06 PROCEDURE — A9270 NON-COVERED ITEM OR SERVICE: HCPCS | Performed by: ANESTHESIOLOGY

## 2021-12-06 PROCEDURE — 0 IOTHALAMATE 60 % SOLUTION: Performed by: SURGERY

## 2021-12-06 PROCEDURE — G0378 HOSPITAL OBSERVATION PER HR: HCPCS

## 2021-12-06 PROCEDURE — 47562 LAPAROSCOPIC CHOLECYSTECTOMY: CPT | Performed by: SURGERY

## 2021-12-06 PROCEDURE — 25010000002 FENTANYL CITRATE (PF) 50 MCG/ML SOLUTION: Performed by: ANESTHESIOLOGY

## 2021-12-06 PROCEDURE — 25010000002 PROPOFOL 10 MG/ML EMULSION: Performed by: ANESTHESIOLOGY

## 2021-12-06 PROCEDURE — 63710000001 ASCORBIC ACID 500 MG TABLET: Performed by: NURSE PRACTITIONER

## 2021-12-06 PROCEDURE — 99203 OFFICE O/P NEW LOW 30 MIN: CPT | Performed by: SURGERY

## 2021-12-06 PROCEDURE — 63710000001 LISINOPRIL 20 MG TABLET: Performed by: NURSE PRACTITIONER

## 2021-12-06 PROCEDURE — 87040 BLOOD CULTURE FOR BACTERIA: CPT | Performed by: EMERGENCY MEDICINE

## 2021-12-06 PROCEDURE — 63710000001 BUPROPION XL 300 MG TABLET SUSTAINED-RELEASE 24 HOUR: Performed by: NURSE PRACTITIONER

## 2021-12-06 PROCEDURE — 63710000001 CETIRIZINE 10 MG TABLET: Performed by: NURSE PRACTITIONER

## 2021-12-06 PROCEDURE — C9803 HOPD COVID-19 SPEC COLLECT: HCPCS

## 2021-12-06 PROCEDURE — 88304 TISSUE EXAM BY PATHOLOGIST: CPT | Performed by: SURGERY

## 2021-12-06 PROCEDURE — 63710000001 FLUTICASONE 50 MCG/ACT SUSPENSION 16 G BOTTLE: Performed by: NURSE PRACTITIONER

## 2021-12-06 PROCEDURE — 25010000002 PIPERACILLIN SOD-TAZOBACTAM PER 1 G: Performed by: SURGERY

## 2021-12-06 PROCEDURE — C1889 IMPLANT/INSERT DEVICE, NOC: HCPCS | Performed by: SURGERY

## 2021-12-06 PROCEDURE — 25010000002 ONDANSETRON PER 1 MG: Performed by: ANESTHESIOLOGY

## 2021-12-06 PROCEDURE — 96365 THER/PROPH/DIAG IV INF INIT: CPT

## 2021-12-06 PROCEDURE — 76705 ECHO EXAM OF ABDOMEN: CPT

## 2021-12-06 PROCEDURE — 63710000001 INSULIN LISPRO (HUMAN) PER 5 UNITS: Performed by: NURSE PRACTITIONER

## 2021-12-06 PROCEDURE — 63710000001 VITAMIN B-12 500 MCG TABLET: Performed by: NURSE PRACTITIONER

## 2021-12-06 PROCEDURE — 83605 ASSAY OF LACTIC ACID: CPT | Performed by: EMERGENCY MEDICINE

## 2021-12-06 PROCEDURE — 25010000002 PIPERACILLIN SOD-TAZOBACTAM PER 1 G: Performed by: EMERGENCY MEDICINE

## 2021-12-06 PROCEDURE — 25010000002 NEOSTIGMINE 5 MG/10ML SOLUTION: Performed by: ANESTHESIOLOGY

## 2021-12-06 PROCEDURE — 63710000001 AMLODIPINE 10 MG TABLET: Performed by: NURSE PRACTITIONER

## 2021-12-06 PROCEDURE — 25010000002 MIDAZOLAM PER 1 MG: Performed by: ANESTHESIOLOGY

## 2021-12-06 PROCEDURE — 25010000002 PIPERACILLIN SOD-TAZOBACTAM PER 1 G: Performed by: NURSE PRACTITIONER

## 2021-12-06 PROCEDURE — 63710000001 ASPIRIN 81 MG TABLET DELAYED-RELEASE: Performed by: NURSE PRACTITIONER

## 2021-12-06 DEVICE — LIGAMAX 5 MM ENDOSCOPIC MULTIPLE CLIP APPLIER
Type: IMPLANTABLE DEVICE | Site: ABDOMEN | Status: FUNCTIONAL
Brand: LIGAMAX

## 2021-12-06 DEVICE — ABSORBABLE HEMOSTAT (OXIDIZED REGENERATED CELLULOSE, U.S.P.)
Type: IMPLANTABLE DEVICE | Site: ABDOMEN | Status: FUNCTIONAL
Brand: SURGICEL

## 2021-12-06 RX ORDER — NICOTINE POLACRILEX 4 MG
15 LOZENGE BUCCAL
Status: DISCONTINUED | OUTPATIENT
Start: 2021-12-06 | End: 2021-12-08 | Stop reason: HOSPADM

## 2021-12-06 RX ORDER — HYDROXYZINE HYDROCHLORIDE 25 MG/1
25 TABLET, FILM COATED ORAL 3 TIMES DAILY PRN
Status: DISCONTINUED | OUTPATIENT
Start: 2021-12-06 | End: 2021-12-08 | Stop reason: HOSPADM

## 2021-12-06 RX ORDER — DIPHENHYDRAMINE HYDROCHLORIDE 50 MG/ML
12.5 INJECTION INTRAMUSCULAR; INTRAVENOUS
Status: DISCONTINUED | OUTPATIENT
Start: 2021-12-06 | End: 2021-12-06 | Stop reason: HOSPADM

## 2021-12-06 RX ORDER — DEXAMETHASONE SODIUM PHOSPHATE 10 MG/ML
INJECTION INTRAMUSCULAR; INTRAVENOUS AS NEEDED
Status: DISCONTINUED | OUTPATIENT
Start: 2021-12-06 | End: 2021-12-06 | Stop reason: SURG

## 2021-12-06 RX ORDER — MIDAZOLAM HYDROCHLORIDE 1 MG/ML
0.5 INJECTION INTRAMUSCULAR; INTRAVENOUS
Status: COMPLETED | OUTPATIENT
Start: 2021-12-06 | End: 2021-12-06

## 2021-12-06 RX ORDER — PROMETHAZINE HYDROCHLORIDE 25 MG/1
25 SUPPOSITORY RECTAL ONCE AS NEEDED
Status: DISCONTINUED | OUTPATIENT
Start: 2021-12-06 | End: 2021-12-06 | Stop reason: HOSPADM

## 2021-12-06 RX ORDER — SODIUM CHLORIDE 9 MG/ML
50 INJECTION, SOLUTION INTRAVENOUS CONTINUOUS
Status: DISCONTINUED | OUTPATIENT
Start: 2021-12-06 | End: 2021-12-07

## 2021-12-06 RX ORDER — BUPIVACAINE HYDROCHLORIDE AND EPINEPHRINE 5; 5 MG/ML; UG/ML
INJECTION, SOLUTION EPIDURAL; INTRACAUDAL; PERINEURAL AS NEEDED
Status: DISCONTINUED | OUTPATIENT
Start: 2021-12-06 | End: 2021-12-06 | Stop reason: HOSPADM

## 2021-12-06 RX ORDER — ATORVASTATIN CALCIUM 20 MG/1
10 TABLET, FILM COATED ORAL NIGHTLY
Refills: 3 | Status: DISCONTINUED | OUTPATIENT
Start: 2021-12-06 | End: 2021-12-08 | Stop reason: HOSPADM

## 2021-12-06 RX ORDER — HYDROCODONE BITARTRATE AND ACETAMINOPHEN 7.5; 325 MG/1; MG/1
1 TABLET ORAL ONCE AS NEEDED
Status: DISCONTINUED | OUTPATIENT
Start: 2021-12-06 | End: 2021-12-06 | Stop reason: HOSPADM

## 2021-12-06 RX ORDER — FENTANYL CITRATE 50 UG/ML
INJECTION, SOLUTION INTRAMUSCULAR; INTRAVENOUS AS NEEDED
Status: DISCONTINUED | OUTPATIENT
Start: 2021-12-06 | End: 2021-12-06 | Stop reason: SURG

## 2021-12-06 RX ORDER — PANTOPRAZOLE SODIUM 40 MG/1
40 TABLET, DELAYED RELEASE ORAL EVERY MORNING
Status: DISCONTINUED | OUTPATIENT
Start: 2021-12-07 | End: 2021-12-08 | Stop reason: HOSPADM

## 2021-12-06 RX ORDER — FENTANYL CITRATE 50 UG/ML
50 INJECTION, SOLUTION INTRAMUSCULAR; INTRAVENOUS
Status: DISCONTINUED | OUTPATIENT
Start: 2021-12-06 | End: 2021-12-06 | Stop reason: HOSPADM

## 2021-12-06 RX ORDER — SODIUM CHLORIDE 0.9 % (FLUSH) 0.9 %
3 SYRINGE (ML) INJECTION EVERY 12 HOURS SCHEDULED
Status: DISCONTINUED | OUTPATIENT
Start: 2021-12-06 | End: 2021-12-06 | Stop reason: HOSPADM

## 2021-12-06 RX ORDER — OXYCODONE HYDROCHLORIDE AND ACETAMINOPHEN 5; 325 MG/1; MG/1
1 TABLET ORAL EVERY 4 HOURS PRN
Status: DISCONTINUED | OUTPATIENT
Start: 2021-12-06 | End: 2021-12-08 | Stop reason: HOSPADM

## 2021-12-06 RX ORDER — SODIUM CHLORIDE 0.9 % (FLUSH) 0.9 %
10 SYRINGE (ML) INJECTION AS NEEDED
Status: DISCONTINUED | OUTPATIENT
Start: 2021-12-06 | End: 2021-12-08 | Stop reason: HOSPADM

## 2021-12-06 RX ORDER — IBUPROFEN 600 MG/1
600 TABLET ORAL ONCE AS NEEDED
Status: DISCONTINUED | OUTPATIENT
Start: 2021-12-06 | End: 2021-12-06 | Stop reason: HOSPADM

## 2021-12-06 RX ORDER — LABETALOL HYDROCHLORIDE 5 MG/ML
5 INJECTION, SOLUTION INTRAVENOUS
Status: DISCONTINUED | OUTPATIENT
Start: 2021-12-06 | End: 2021-12-06 | Stop reason: HOSPADM

## 2021-12-06 RX ORDER — DIPHENHYDRAMINE HCL 25 MG
25 CAPSULE ORAL
Status: DISCONTINUED | OUTPATIENT
Start: 2021-12-06 | End: 2021-12-06 | Stop reason: HOSPADM

## 2021-12-06 RX ORDER — EPHEDRINE SULFATE 50 MG/ML
5 INJECTION, SOLUTION INTRAVENOUS ONCE AS NEEDED
Status: DISCONTINUED | OUTPATIENT
Start: 2021-12-06 | End: 2021-12-06 | Stop reason: HOSPADM

## 2021-12-06 RX ORDER — ALUMINA, MAGNESIA, AND SIMETHICONE 2400; 2400; 240 MG/30ML; MG/30ML; MG/30ML
15 SUSPENSION ORAL EVERY 6 HOURS PRN
Status: DISCONTINUED | OUTPATIENT
Start: 2021-12-06 | End: 2021-12-08 | Stop reason: HOSPADM

## 2021-12-06 RX ORDER — NEOSTIGMINE METHYLSULFATE 0.5 MG/ML
INJECTION, SOLUTION INTRAVENOUS AS NEEDED
Status: DISCONTINUED | OUTPATIENT
Start: 2021-12-06 | End: 2021-12-06 | Stop reason: SURG

## 2021-12-06 RX ORDER — HYDROMORPHONE HYDROCHLORIDE 1 MG/ML
0.5 INJECTION, SOLUTION INTRAMUSCULAR; INTRAVENOUS; SUBCUTANEOUS
Status: DISCONTINUED | OUTPATIENT
Start: 2021-12-06 | End: 2021-12-06 | Stop reason: HOSPADM

## 2021-12-06 RX ORDER — INSULIN LISPRO 100 [IU]/ML
0-9 INJECTION, SOLUTION INTRAVENOUS; SUBCUTANEOUS
Status: DISCONTINUED | OUTPATIENT
Start: 2021-12-06 | End: 2021-12-08 | Stop reason: HOSPADM

## 2021-12-06 RX ORDER — HYDROMORPHONE HYDROCHLORIDE 1 MG/ML
0.5 INJECTION, SOLUTION INTRAMUSCULAR; INTRAVENOUS; SUBCUTANEOUS
Status: DISCONTINUED | OUTPATIENT
Start: 2021-12-06 | End: 2021-12-08 | Stop reason: HOSPADM

## 2021-12-06 RX ORDER — CEFAZOLIN SODIUM 2 G/100ML
2 INJECTION, SOLUTION INTRAVENOUS
Status: DISCONTINUED | OUTPATIENT
Start: 2021-12-06 | End: 2021-12-06 | Stop reason: HOSPADM

## 2021-12-06 RX ORDER — BUPROPION HYDROCHLORIDE 300 MG/1
300 TABLET ORAL DAILY
Status: DISCONTINUED | OUTPATIENT
Start: 2021-12-06 | End: 2021-12-08 | Stop reason: HOSPADM

## 2021-12-06 RX ORDER — NITROGLYCERIN 0.4 MG/1
0.4 TABLET SUBLINGUAL
Status: DISCONTINUED | OUTPATIENT
Start: 2021-12-06 | End: 2021-12-08 | Stop reason: HOSPADM

## 2021-12-06 RX ORDER — PROMETHAZINE HYDROCHLORIDE 25 MG/1
25 TABLET ORAL ONCE AS NEEDED
Status: DISCONTINUED | OUTPATIENT
Start: 2021-12-06 | End: 2021-12-06 | Stop reason: HOSPADM

## 2021-12-06 RX ORDER — LIDOCAINE HYDROCHLORIDE 20 MG/ML
INJECTION, SOLUTION INFILTRATION; PERINEURAL AS NEEDED
Status: DISCONTINUED | OUTPATIENT
Start: 2021-12-06 | End: 2021-12-06 | Stop reason: SURG

## 2021-12-06 RX ORDER — MAGNESIUM HYDROXIDE 1200 MG/15ML
LIQUID ORAL AS NEEDED
Status: DISCONTINUED | OUTPATIENT
Start: 2021-12-06 | End: 2021-12-06 | Stop reason: HOSPADM

## 2021-12-06 RX ORDER — ACETAMINOPHEN 325 MG/1
650 TABLET ORAL EVERY 4 HOURS PRN
Status: DISCONTINUED | OUTPATIENT
Start: 2021-12-06 | End: 2021-12-08 | Stop reason: HOSPADM

## 2021-12-06 RX ORDER — OXYCODONE AND ACETAMINOPHEN 7.5; 325 MG/1; MG/1
1 TABLET ORAL EVERY 4 HOURS PRN
Status: DISCONTINUED | OUTPATIENT
Start: 2021-12-06 | End: 2021-12-06 | Stop reason: HOSPADM

## 2021-12-06 RX ORDER — SODIUM CHLORIDE, SODIUM LACTATE, POTASSIUM CHLORIDE, CALCIUM CHLORIDE 600; 310; 30; 20 MG/100ML; MG/100ML; MG/100ML; MG/100ML
9 INJECTION, SOLUTION INTRAVENOUS CONTINUOUS
Status: DISCONTINUED | OUTPATIENT
Start: 2021-12-06 | End: 2021-12-06

## 2021-12-06 RX ORDER — NALOXONE HCL 0.4 MG/ML
0.2 VIAL (ML) INJECTION AS NEEDED
Status: DISCONTINUED | OUTPATIENT
Start: 2021-12-06 | End: 2021-12-06 | Stop reason: HOSPADM

## 2021-12-06 RX ORDER — ROCURONIUM BROMIDE 10 MG/ML
INJECTION, SOLUTION INTRAVENOUS AS NEEDED
Status: DISCONTINUED | OUTPATIENT
Start: 2021-12-06 | End: 2021-12-06 | Stop reason: SURG

## 2021-12-06 RX ORDER — CHOLECALCIFEROL (VITAMIN D3) 125 MCG
1000 CAPSULE ORAL DAILY
Status: DISCONTINUED | OUTPATIENT
Start: 2021-12-06 | End: 2021-12-08 | Stop reason: HOSPADM

## 2021-12-06 RX ORDER — ASPIRIN 81 MG/1
81 TABLET ORAL DAILY
Status: DISCONTINUED | OUTPATIENT
Start: 2021-12-06 | End: 2021-12-08 | Stop reason: HOSPADM

## 2021-12-06 RX ORDER — AMLODIPINE BESYLATE 10 MG/1
10 TABLET ORAL DAILY
Status: DISCONTINUED | OUTPATIENT
Start: 2021-12-06 | End: 2021-12-08 | Stop reason: HOSPADM

## 2021-12-06 RX ORDER — ASCORBIC ACID 500 MG
500 TABLET ORAL DAILY
Status: DISCONTINUED | OUTPATIENT
Start: 2021-12-06 | End: 2021-12-08 | Stop reason: HOSPADM

## 2021-12-06 RX ORDER — DEXTROSE MONOHYDRATE 25 G/50ML
25 INJECTION, SOLUTION INTRAVENOUS
Status: DISCONTINUED | OUTPATIENT
Start: 2021-12-06 | End: 2021-12-08 | Stop reason: HOSPADM

## 2021-12-06 RX ORDER — GLYCOPYRROLATE 0.2 MG/ML
INJECTION INTRAMUSCULAR; INTRAVENOUS AS NEEDED
Status: DISCONTINUED | OUTPATIENT
Start: 2021-12-06 | End: 2021-12-06 | Stop reason: SURG

## 2021-12-06 RX ORDER — MELATONIN
1000 DAILY
Status: DISCONTINUED | OUTPATIENT
Start: 2021-12-06 | End: 2021-12-08 | Stop reason: HOSPADM

## 2021-12-06 RX ORDER — LIDOCAINE HYDROCHLORIDE 10 MG/ML
0.5 INJECTION, SOLUTION EPIDURAL; INFILTRATION; INTRACAUDAL; PERINEURAL ONCE AS NEEDED
Status: DISCONTINUED | OUTPATIENT
Start: 2021-12-06 | End: 2021-12-06 | Stop reason: HOSPADM

## 2021-12-06 RX ORDER — HYDRALAZINE HYDROCHLORIDE 20 MG/ML
5 INJECTION INTRAMUSCULAR; INTRAVENOUS
Status: DISCONTINUED | OUTPATIENT
Start: 2021-12-06 | End: 2021-12-06 | Stop reason: HOSPADM

## 2021-12-06 RX ORDER — FAMOTIDINE 20 MG
1000 TABLET ORAL DAILY
Status: DISCONTINUED | OUTPATIENT
Start: 2021-12-06 | End: 2021-12-06

## 2021-12-06 RX ORDER — ONDANSETRON 2 MG/ML
4 INJECTION INTRAMUSCULAR; INTRAVENOUS ONCE AS NEEDED
Status: DISCONTINUED | OUTPATIENT
Start: 2021-12-06 | End: 2021-12-06 | Stop reason: HOSPADM

## 2021-12-06 RX ORDER — ONDANSETRON 4 MG/1
4 TABLET, FILM COATED ORAL EVERY 6 HOURS PRN
Status: DISCONTINUED | OUTPATIENT
Start: 2021-12-06 | End: 2021-12-08 | Stop reason: HOSPADM

## 2021-12-06 RX ORDER — LISINOPRIL 10 MG/1
10 TABLET ORAL DAILY
Status: DISCONTINUED | OUTPATIENT
Start: 2021-12-06 | End: 2021-12-08 | Stop reason: HOSPADM

## 2021-12-06 RX ORDER — SODIUM CHLORIDE 9 MG/ML
INJECTION, SOLUTION INTRAVENOUS AS NEEDED
Status: DISCONTINUED | OUTPATIENT
Start: 2021-12-06 | End: 2021-12-06 | Stop reason: HOSPADM

## 2021-12-06 RX ORDER — ONDANSETRON 2 MG/ML
INJECTION INTRAMUSCULAR; INTRAVENOUS AS NEEDED
Status: DISCONTINUED | OUTPATIENT
Start: 2021-12-06 | End: 2021-12-06 | Stop reason: SURG

## 2021-12-06 RX ORDER — FLUMAZENIL 0.1 MG/ML
0.2 INJECTION INTRAVENOUS AS NEEDED
Status: DISCONTINUED | OUTPATIENT
Start: 2021-12-06 | End: 2021-12-06 | Stop reason: HOSPADM

## 2021-12-06 RX ORDER — CETIRIZINE HYDROCHLORIDE 10 MG/1
10 TABLET ORAL DAILY
Status: DISCONTINUED | OUTPATIENT
Start: 2021-12-06 | End: 2021-12-08 | Stop reason: HOSPADM

## 2021-12-06 RX ORDER — PROPOFOL 10 MG/ML
VIAL (ML) INTRAVENOUS AS NEEDED
Status: DISCONTINUED | OUTPATIENT
Start: 2021-12-06 | End: 2021-12-06 | Stop reason: SURG

## 2021-12-06 RX ORDER — FLUTICASONE PROPIONATE 50 MCG
2 SPRAY, SUSPENSION (ML) NASAL DAILY
Status: DISCONTINUED | OUTPATIENT
Start: 2021-12-06 | End: 2021-12-08 | Stop reason: HOSPADM

## 2021-12-06 RX ORDER — ONDANSETRON 2 MG/ML
4 INJECTION INTRAMUSCULAR; INTRAVENOUS EVERY 6 HOURS PRN
Status: DISCONTINUED | OUTPATIENT
Start: 2021-12-06 | End: 2021-12-08 | Stop reason: HOSPADM

## 2021-12-06 RX ORDER — FAMOTIDINE 10 MG/ML
20 INJECTION, SOLUTION INTRAVENOUS ONCE
Status: COMPLETED | OUTPATIENT
Start: 2021-12-06 | End: 2021-12-06

## 2021-12-06 RX ORDER — SODIUM CHLORIDE 0.9 % (FLUSH) 0.9 %
3-10 SYRINGE (ML) INJECTION AS NEEDED
Status: DISCONTINUED | OUTPATIENT
Start: 2021-12-06 | End: 2021-12-06 | Stop reason: HOSPADM

## 2021-12-06 RX ADMIN — MIDAZOLAM 0.5 MG: 1 INJECTION INTRAMUSCULAR; INTRAVENOUS at 08:38

## 2021-12-06 RX ADMIN — OXYCODONE HYDROCHLORIDE AND ACETAMINOPHEN 500 MG: 500 TABLET ORAL at 17:49

## 2021-12-06 RX ADMIN — INSULIN LISPRO 2 UNITS: 100 INJECTION, SOLUTION INTRAVENOUS; SUBCUTANEOUS at 17:50

## 2021-12-06 RX ADMIN — FLUTICASONE PROPIONATE 2 SPRAY: 50 SPRAY, METERED NASAL at 17:48

## 2021-12-06 RX ADMIN — TAZOBACTAM SODIUM AND PIPERACILLIN SODIUM 3.38 G: 375; 3 INJECTION, SOLUTION INTRAVENOUS at 23:42

## 2021-12-06 RX ADMIN — PROPOFOL 150 MG: 10 INJECTION, EMULSION INTRAVENOUS at 09:18

## 2021-12-06 RX ADMIN — TAZOBACTAM SODIUM AND PIPERACILLIN SODIUM 3.38 G: 375; 3 INJECTION, SOLUTION INTRAVENOUS at 01:35

## 2021-12-06 RX ADMIN — ONDANSETRON 4 MG: 2 INJECTION INTRAMUSCULAR; INTRAVENOUS at 10:37

## 2021-12-06 RX ADMIN — FAMOTIDINE 20 MG: 10 INJECTION INTRAVENOUS at 08:38

## 2021-12-06 RX ADMIN — TAZOBACTAM SODIUM AND PIPERACILLIN SODIUM 3.38 G: 375; 3 INJECTION, SOLUTION INTRAVENOUS at 09:05

## 2021-12-06 RX ADMIN — BUPROPION HYDROCHLORIDE 300 MG: 300 TABLET, EXTENDED RELEASE ORAL at 17:49

## 2021-12-06 RX ADMIN — TAZOBACTAM SODIUM AND PIPERACILLIN SODIUM 3.38 G: 375; 3 INJECTION, SOLUTION INTRAVENOUS at 17:49

## 2021-12-06 RX ADMIN — FENTANYL CITRATE 100 MCG: 0.05 INJECTION, SOLUTION INTRAMUSCULAR; INTRAVENOUS at 09:16

## 2021-12-06 RX ADMIN — SODIUM CHLORIDE, POTASSIUM CHLORIDE, SODIUM LACTATE AND CALCIUM CHLORIDE 9 ML/HR: 600; 310; 30; 20 INJECTION, SOLUTION INTRAVENOUS at 08:44

## 2021-12-06 RX ADMIN — FENTANYL CITRATE 50 MCG: 50 INJECTION INTRAMUSCULAR; INTRAVENOUS at 12:16

## 2021-12-06 RX ADMIN — AMLODIPINE BESYLATE 10 MG: 10 TABLET ORAL at 17:48

## 2021-12-06 RX ADMIN — SODIUM CHLORIDE 125 ML/HR: 9 INJECTION, SOLUTION INTRAVENOUS at 01:37

## 2021-12-06 RX ADMIN — GLYCOPYRROLATE 0.6 MG: 0.2 INJECTION INTRAMUSCULAR; INTRAVENOUS at 10:42

## 2021-12-06 RX ADMIN — OXYCODONE AND ACETAMINOPHEN 1 TABLET: 7.5; 325 TABLET ORAL at 13:11

## 2021-12-06 RX ADMIN — ASPIRIN 81 MG: 81 TABLET, COATED ORAL at 17:48

## 2021-12-06 RX ADMIN — LIDOCAINE HYDROCHLORIDE 100 MG: 20 INJECTION, SOLUTION INFILTRATION; PERINEURAL at 09:17

## 2021-12-06 RX ADMIN — Medication 1000 UNITS: at 17:49

## 2021-12-06 RX ADMIN — SODIUM CHLORIDE 50 ML/HR: 9 INJECTION, SOLUTION INTRAVENOUS at 20:43

## 2021-12-06 RX ADMIN — ROCURONIUM BROMIDE 40 MG: 50 INJECTION INTRAVENOUS at 09:18

## 2021-12-06 RX ADMIN — Medication 1000 MCG: at 17:49

## 2021-12-06 RX ADMIN — LISINOPRIL 10 MG: 20 TABLET ORAL at 17:49

## 2021-12-06 RX ADMIN — ROCURONIUM BROMIDE 10 MG: 50 INJECTION INTRAVENOUS at 10:09

## 2021-12-06 RX ADMIN — NEOSTIGMINE METHYLSULFATE 3 MG: 0.5 INJECTION INTRAVENOUS at 10:42

## 2021-12-06 RX ADMIN — SODIUM CHLORIDE 250 ML: 9 INJECTION, SOLUTION INTRAVENOUS at 01:37

## 2021-12-06 RX ADMIN — FENTANYL CITRATE 50 MCG: 50 INJECTION INTRAMUSCULAR; INTRAVENOUS at 11:35

## 2021-12-06 RX ADMIN — ATORVASTATIN CALCIUM 10 MG: 20 TABLET, FILM COATED ORAL at 20:43

## 2021-12-06 RX ADMIN — CETIRIZINE HYDROCHLORIDE 10 MG: 10 TABLET ORAL at 17:49

## 2021-12-06 RX ADMIN — DEXAMETHASONE SODIUM PHOSPHATE 8 MG: 10 INJECTION INTRAMUSCULAR; INTRAVENOUS at 09:50

## 2021-12-06 RX ADMIN — MIDAZOLAM 0.5 MG: 1 INJECTION INTRAMUSCULAR; INTRAVENOUS at 08:47

## 2021-12-06 NOTE — ED NOTES
"Dr. Palmer notified of pt not being able to give a urine sample. No further orders received, pt in no distress. Per Dr. Palmer, \"kidney function is okay and I didn't see anything concerning on the CT.\"     Dixie Still, RN  12/06/21 [id number]    "

## 2021-12-06 NOTE — ED PROVIDER NOTES
EMERGENCY DEPARTMENT ENCOUNTER    CHIEF COMPLAINT  Chief Complaint: Abdominal distention  History given by: Patient and family at bedside  History limited by: Patient is a poor historian  Room Number: 24/24  PMD: Dereje Loya APRN  Nephrologist: Dr. Haddad    HPI:  Pt is a 76 y.o. male presents complaining of abdominal distention for 5 days, epigastric/lower sternal discomfort intermittent for 5 days, last lower sternal discomfort 2 days prior to arrival.  Patient reports poor appetite and no urination within the past 24 hours and 1 urination within the past 48 hours.  Patient denies urinary urgency or feeling that his bladder is full.  Patient denies shortness of air, cough, fever, nausea, vomiting, swelling of extremities.  Patient reports chronic bilateral groin discomfort for 2 years that is unchanged from previous.  Patient denies testicular/scrotal pain or swelling.    Duration: 5 days  Associated Symptoms: Poor appetite, decreased urination, lower sternal/epigastric discomfort  Aggravating Factors: Trying to eat or drink  Alleviating Factors: Nothing  Treatment before arrival: Seen in [facility] and advised to go to the emergency department 12/2/2021    Upon review the patient chart is noted:  Patient had blood work drawn 12/2/2021 with negative troponin, renal function within normal limits, mild hyperglycemia, left the ER prior to provider evaluation on the day    PAST MEDICAL HISTORY  Active Ambulatory Problems     Diagnosis Date Noted   • Anemia 06/12/2016   • Anxiety 06/12/2016   • Chronic kidney disease, stage III (moderate) ([facility], [facility] within [facility]) 06/12/2016   • Diabetes mellitus (HCC) 06/12/2016   • Erectile dysfunction of nonorganic origin 06/12/2016   • Gastroesophageal reflux disease 06/12/2016   • Hyperlipidemia 06/12/2016   • Hypertension 06/12/2016   • Iron deficiency anemia 03/14/2018   • Special screening for malignant neoplasm of prostate 10/04/2018   • Diverticulosis 10/08/2021   • Tear of  medial meniscus of knee 2020   • Seasonal allergies 2021   • Rheumatic fever 2021   • Renal failure 2021   • H/O: gout 2019   • Diverticulitis of colon 2021   • Osteoarthritis of knee 2020   • Arthritis of knee 2019     Resolved Ambulatory Problems     Diagnosis Date Noted   • Inguinal pain 2016   • Prostate disorder 10/06/2017   • Fever 2019   • Flu 2019     Past Medical History:   Diagnosis Date   • Arthritis    • Chronic kidney disease    • Coronary artery disease    • GERD (gastroesophageal reflux disease)    • GI bleed    • Low back pain    • Peripheral neuropathy    • Spinal stenosis, lumbar        PAST SURGICAL HISTORY  Past Surgical History:   Procedure Laterality Date   • CATARACT EXTRACTION Bilateral    • COLONOSCOPY N/A 2016    tics, IH, polyps   • ENDOSCOPY N/A 2016    LA Grade B reflux esophagitis, erythematous mucosa in stomach   • ENDOSCOPY N/A 2017    Two jejunal AVM's    • ENTEROSCOPY SMALL BOWEL  2017    Procedure: ENTEROSCOPY SMALL BOWEL;  Surgeon: Anil Prakash MD;  Location: [facility] ENDOSCOPY;  Service:    • EPIDURAL BLOCK     • KNEE ARTHROPLASTY, PARTIAL REPLACEMENT Left 2021   • KNEE ARTHROSCOPY Right    • SHOULDER ARTHROSCOPY Right    • TONSILLECTOMY         FAMILY HISTORY  Family History   Problem Relation Age of Onset   • Diverticulosis Father    • Diverticulitis Father    • Alzheimer's disease Mother    • No Known Problems Sister        SOCIAL HISTORY  Social History     Socioeconomic History   • Marital status:      Spouse name: Lea   • Number of children: 3   Tobacco Use   • Smoking status: Former Smoker     Packs/day: 1.50     Years: 18.00     Pack years: 27.00     Types: Cigarettes     Quit date: 2000     Years since quittin.9   • Smokeless tobacco: Former User   • Tobacco comment: quit    Vaping Use   • Vaping Use: Never used   Substance and Sexual Activity   • Alcohol use: No    • Drug use: No   • Sexual activity: Defer       ALLERGIES  Patient has no known allergies.    REVIEW OF SYSTEMS  Review of Systems   Constitutional: Positive for appetite change. Negative for chills and fever.   HENT: Negative for sore throat and trouble swallowing.    Eyes: Negative for visual disturbance.   Respiratory: Negative for cough and shortness of breath.    Cardiovascular: Positive for chest pain ( Lower sternal, epigastric, intermittent for 5 days, last episode 3 days prior to arrival). Negative for leg swelling.   Gastrointestinal: Positive for abdominal distention and abdominal pain ( Epigastric, intermittent for 5 days). Negative for diarrhea and vomiting.   Endocrine: Negative.    Genitourinary: Positive for decreased urine volume. Negative for frequency.   Musculoskeletal: Negative for neck pain.   Skin: Negative for rash.   Allergic/Immunologic: Negative.    Neurological: Negative for weakness and numbness.   Hematological: Negative.    Psychiatric/Behavioral: Negative.    All other systems reviewed and are negative.      PHYSICAL EXAM  ED Triage Vitals [12/05/21 1923]   Temp Heart Rate Resp BP SpO2   99.9 °F (37.7 °C) 92 18 109/59 93 %      Temp src Heart Rate Source Patient Position BP Location FiO2 (%)   -- -- -- -- --       Physical Exam  Vitals and nursing note reviewed.   Constitutional:       General: He is in acute distress.   HENT:      Head: Normocephalic and atraumatic.   Cardiovascular:      Rate and Rhythm: Normal rate and regular rhythm.      Pulses:           Posterior tibial pulses are 2+ on the right side and 2+ on the left side.      Heart sounds: Normal heart sounds. No murmur heard.      Pulmonary:      Effort: Pulmonary effort is normal. No respiratory distress.      Breath sounds: Normal breath sounds. No wheezing.   Abdominal:      General: Bowel sounds are normal.      Palpations: Abdomen is soft.      Tenderness: There is no abdominal tenderness. There is no guarding or  rebound.   Musculoskeletal:         General: Normal range of motion.      Cervical back: Normal range of motion.   Skin:     General: Skin is warm and dry.   Neurological:      Mental Status: He is alert and oriented to person, place, and time.   Psychiatric:         Mood and Affect: Affect normal.         LAB RESULTS  Lab Results (last 24 hours)     Procedure Component Value Units Date/Time    CBC & Differential [[id number]]  (Abnormal) Collected: 12/05/21 1941    Specimen: Blood Updated: 12/05/21 1951    Narrative:      The following orders were created for panel order CBC & Differential.  Procedure                               Abnormality         Status                     ---------                               -----------         ------                     CBC Auto Differential[[id number]]        Abnormal            Final result                 Please view results for these tests on the individual orders.    Comprehensive Metabolic Panel [[id number]]  (Abnormal) Collected: 12/05/21 1941    Specimen: Blood Updated: 12/05/21 2014     Glucose 102 mg/dL      BUN 24 mg/dL      Creatinine 1.21 mg/dL      Sodium 132 mmol/L      Potassium 4.1 mmol/L      Chloride 96 mmol/L      CO2 21.7 mmol/L      Calcium 8.9 mg/dL      Total Protein 6.2 g/dL      Albumin 3.60 g/dL      ALT (SGPT) 29 U/L      AST (SGOT) 35 U/L      Alkaline Phosphatase 54 U/L      Total Bilirubin 1.3 mg/dL      eGFR Non African Amer 58 mL/min/1.73      Globulin 2.6 gm/dL      A/G Ratio 1.4 g/dL      BUN/Creatinine Ratio 19.8     Anion Gap 14.3 mmol/L     Narrative:      GFR Normal >60  Chronic Kidney Disease <60  Kidney Failure <15      Lipase [[id number]]  (Normal) Collected: 12/05/21 1941    Specimen: Blood Updated: 12/05/21 2014     Lipase 33 U/L     CBC Auto Differential [[id number]]  (Abnormal) Collected: 12/05/21 1941    Specimen: Blood Updated: 12/05/21 1951     WBC 12.11 10*3/mm3      RBC 4.21 10*6/mm3      Hemoglobin 13.0 g/dL      Hematocrit  37.7 %      MCV 89.5 fL      MCH 30.9 pg      MCHC 34.5 g/dL      RDW 12.5 %      RDW-SD 39.9 fl      MPV 9.1 fL      Platelets 302 10*3/mm3      Neutrophil % 82.6 %      Lymphocyte % 5.0 %      Monocyte % 10.8 %      Eosinophil % 0.7 %      Basophil % 0.4 %      Immature Grans % 0.5 %      Neutrophils, Absolute 10.01 10*3/mm3      Lymphocytes, Absolute 0.60 10*3/mm3      Monocytes, Absolute 1.31 10*3/mm3      Eosinophils, Absolute 0.08 10*3/mm3      Basophils, Absolute 0.05 10*3/mm3      Immature Grans, Absolute 0.06 10*3/mm3      nRBC 0.0 /100 WBC     Troponin [[id number]]  (Normal) Collected: 12/05/21 1941    Specimen: Blood Updated: 12/05/21 2227     Troponin T <0.010 ng/mL     Narrative:      Troponin T Reference Range:  <= 0.03 ng/mL-   Negative for AMI  >0.03 ng/mL-     Abnormal for myocardial necrosis.  Clinicians would have to utilize clinical acumen, EKG, Troponin and serial changes to determine if it is an Acute Myocardial Infarction or myocardial injury due to an underlying chronic condition.       Results may be falsely decreased if patient taking Biotin.            I ordered the above labs and reviewed the results    RADIOLOGY  CT Abdomen Pelvis With Contrast   Final Result   1. Findings are consistent with acute cholecystitis. 6 mm polyp or   poorly mineralized stone near the gallbladder neck. No biliary ductal   dilatation.   2. 1.9 cm left renal exophytic lower pole lesion is denser than   typically seen for a simple cyst though this appearance may be due to   motion related artifact. Hyperdense cyst or solid lesion are also in the   differential diagnosis and this could be further evaluated with renal   sonogram or multiphasic CT.   3. Sigmoid diverticulosis without evidence for diverticulitis. D       Discussed with Dr. Palmer in the emergency department on 12/06/2021 at   12:05 AM.        Radiation dose reduction techniques were utilized, including automated   exposure control and exposure  modulation based on body size.       This report was finalized on 12/6/2021 12:37 AM by Dr. Vipin Ambriz M.D.          XR Chest 1 View   Final Result   No evidence for active disease in the chest.       This report was finalized on 12/5/2021 10:21 PM by Dr. Vipin Ambriz M.D.          US Gallbladder    (Results Pending)        I ordered the above noted radiological studies. Interpreted by radiologist. Viewed by me in PACS.       PROCEDURES  Procedures      PROGRESS AND CONSULTS  ED Course as of 12/06/21 0347   Mon Dec 06, 2021   0009 Discussed with Dr. Eben Ambriz, radiology, who reports patient CT abdomen significant for gallbladder wall thickening, pericholecystic fluid, 6 mm stone versus polyp in the lumen of the gallbladder.  He reports patient has a 1.9 cm left renal lesion that needs further testing/follow-up [TO]   0050 Discussed with Dr. Arnold Telles, on-call for surgery who is aware of patient's presentation, imaging, labs.  She request admit to medicine, will see in consult for definitive care of his cholecystitis without overt signs of obstruction [TO]   0252 Discussed with JOHANA Mulligan, on-call for A who is aware of patient's presentation, imaging, my discussion with Dr. Arnold Moncada, lab and she agrees to admit to Dr. Duarte Sharma for further testing, treatment as needed.  She is aware that Dr. Telles plans to take the patient for detail definitive treatment of his cholecystitis in the morning. [TO]      ED Course User Index  [TO] Sonia Palmer MD       EKG          EKG time: 2323  Rhythm/Rate: Sinus rhythm, rate in the 80s  P waves and KS: Normal P waves, normal TAJ's  QRS, axis: Unremarkable  ST and T waves: Unremarkable    Interpreted Contemporaneously by me, independently viewed  Mildly changed compared to prior 12/2/2021, tachycardia improved      MEDICAL DECISION MAKING  Results were reviewed/discussed with the patient and they were also made aware of online access. Pt  also made aware that some labs, such as cultures, will not be resulted during ER visit and follow up with PMD is necessary.       MDM       DIAGNOSIS  Final diagnoses:   Cholecystitis   Renal lesion   Hyponatremia       DISPOSITION  ADMISSION    Discussed treatment plan and reason for admission with pt/family and admitting physician.  Pt/family voiced understanding of the plan for admission for further testing/treatment as needed.         Latest Documented Vital Signs:  As of 01:14 EST  BP- 125/70 HR- 81 Temp- 99.9 °F (37.7 °C) O2 sat- 94%    --  Patient was wearing facemask when I entered the room and throughout our encounter. Full protective equipment was worn throughout this patient encounter including a face mask, eye protection and gloves. Hand hygiene was performed before donning protective equipment and after removal when leaving the room.      Sonia Palmer MD  12/06/21 0346

## 2021-12-06 NOTE — H&P
Patient Name:  Danyel Dsah  YOB: 1945  MRN:  [id number]  Admit Date:  12/5/2021  Patient Care Team:  Dereje Loya APRN as PCP - General (Family Medicine)  Perlita Sandoval MD as Consulting Physician (Hematology and Oncology)  Anil Prakash MD as Referring Physician (Gastroenterology)  Danyel Valencia MD as Consulting Physician (Hematology and Oncology)      Subjective   History Present Illness     Chief Complaint   Patient presents with   • Abdominal Pain   • Weakness - Generalized       Mr. Dash is a 76 y.o. male former smoker with a history of DM, CKD, CAD, HTN, HLD, GERD, anemia that presents to [facility] complaining of abdominal pain. Symptoms present for a few days. Pain located epigastric/lower chest. He was concerned for cardiac etiology. Denies fever, shortness of breath, n/v/d. He has also had urinary retention. Imaging showed evidence of gallbladder wall thickening & percholdycystic fluid concerning for acute cholecystitis. GB US showed evidence of gallstones. There was also evidence of lt renal lesion w/recommendation for renal US or multiphasic CT. He was evaluated by Surgery and taken to OR this morning for laparoscopic cholecystectomy. He was seen in OR recovery. Awake, alert; denies fever, chest pain, shortness of breath, n/v.    TMax 99.9. HR controlled. BP stable. Trop neg. WBC 12.11, Hgb 13. Lipase 33. Gluc 102, BUN 24, Na 132, CO2 21.7, Total bili 1.3. Lactate 1.0. Covid neg. Blood cultures collected.    Review of Systems   Constitutional: Negative for fever.   HENT: Negative for congestion.    Respiratory: Negative for shortness of breath.    Cardiovascular: Negative for chest pain and palpitations.   Gastrointestinal: Positive for abdominal distention and abdominal pain. Negative for diarrhea, nausea and vomiting.   Genitourinary: Positive for difficulty urinating.   Musculoskeletal: Negative for arthralgias and myalgias.   Skin: Negative for rash.    Neurological: Negative for dizziness and headaches.   Psychiatric/Behavioral: Negative for sleep disturbance.        Personal History     Past Medical History:   Diagnosis Date   • Anemia    • Anxiety    • Arthritis    • Chronic kidney disease     CKD stage 3   • Coronary artery disease     rheumatic fever at age 7   • Diabetes mellitus (HCC)    • GERD (gastroesophageal reflux disease)    • GI bleed    • Hyperlipidemia    • Hypertension    • Low back pain    • Peripheral neuropathy    • Rheumatic fever    • Spinal stenosis, lumbar      Past Surgical History:   Procedure Laterality Date   • CATARACT EXTRACTION Bilateral    • COLONOSCOPY N/A 2016    tics, IH, polyps   • ENDOSCOPY N/A 2016    LA Grade B reflux esophagitis, erythematous mucosa in stomach   • ENDOSCOPY N/A 2017    Two jejunal AVM's    • ENTEROSCOPY SMALL BOWEL  2017    Procedure: ENTEROSCOPY SMALL BOWEL;  Surgeon: Anil Prakash MD;  Location: [facility] ENDOSCOPY;  Service:    • EPIDURAL BLOCK     • KNEE ARTHROPLASTY, PARTIAL REPLACEMENT Left 2021   • KNEE ARTHROSCOPY Right    • SHOULDER ARTHROSCOPY Right    • TONSILLECTOMY       Family History   Problem Relation Age of Onset   • Diverticulosis Father    • Diverticulitis Father    • Alzheimer's disease Mother    • No Known Problems Sister      Social History     Tobacco Use   • Smoking status: Former Smoker     Packs/day: 1.50     Years: 18.00     Pack years: 27.00     Types: Cigarettes     Quit date: 2000     Years since quittin.9   • Smokeless tobacco: Former User   • Tobacco comment: quit    Vaping Use   • Vaping Use: Never used   Substance Use Topics   • Alcohol use: No   • Drug use: No     No current facility-administered medications on file prior to encounter.     Current Outpatient Medications on File Prior to Encounter   Medication Sig Dispense Refill   • acetaminophen (TYLENOL) 325 MG tablet Take 650 mg by mouth Every 6 (Six) Hours As Needed for Mild Pain .     •  amLODIPine (NORVASC) 10 MG tablet Take 1 tablet by mouth Daily. 90 tablet 3   • aspirin 81 MG EC tablet Take 81 mg by mouth Daily.     • Blood Gluc Meter Disp-Strips device Pt to monitor blood glucose two times daily 1 each 0   • buPROPion XL (WELLBUTRIN XL) 300 MG 24 hr tablet Take 1 tablet by mouth Daily. 90 tablet 3   • Cholecalciferol (VITAMIN D-3) 1000 UNITS capsule Take 1,000 Units by mouth Daily.     • Diclofenac Sodium (VOLTAREN) 1 % gel gel      • ferrous gluconate (FERGON) 240 (27 FE) MG tablet 480 mg.     • fluticasone (FLONASE) 50 MCG/ACT nasal spray 2 sprays into each nostril Daily. 15.8 mL 0   • hydrOXYzine (ATARAX) 25 MG tablet TAKE ONE TABLET BY MOUTH THREE TIMES A DAY AS NEEDED FOR ANXIETY. 90 tablet 0   • lisinopril (PRINIVIL,ZESTRIL) 10 MG tablet Take 1 tablet by mouth Daily. 90 tablet 3   • loratadine (CLARITIN) 10 MG tablet 10 mg.     • pantoprazole (Protonix) 40 MG EC tablet Take 1 tablet by mouth Daily. 90 tablet 0   • pioglitazone (ACTOS) 45 MG tablet Take 1 tablet by mouth Daily. 90 tablet 3   • simvastatin (ZOCOR) 20 MG tablet Take 1 tablet by mouth Daily. 90 tablet 3   • sucralfate (Carafate) 1 g tablet Take 1 tablet by mouth 4 (Four) Times a Day As Needed (stomach pain) for up to 7 days. 28 tablet 0   • traMADol (ULTRAM) 50 MG tablet      • vitamin B-12 (CYANOCOBALAMIN) 1000 MCG tablet Take 1,000 mcg by mouth Daily.     • vitamin C (ASCORBIC ACID) 500 MG tablet Take 500 mg by mouth Daily.       No Known Allergies    Objective    Objective     Vital Signs  Temp:  [97.9 °F (36.6 °C)-99.9 °F (37.7 °C)] 98.2 °F (36.8 °C)  Heart Rate:  [74-97] 82  Resp:  [16-18] 18  BP: (109-155)/(55-79) 155/61  SpO2:  [89 %-96 %] 93 %  on  Flow (L/min):  [3-4] 4;   Device (Oxygen Therapy): nasal cannula  There is no height or weight on file to calculate BMI.    Physical Exam  Vitals and nursing note reviewed.   Constitutional:       General: He is not in acute distress.     Appearance: He is obese.   HENT:       Head: Normocephalic.      Mouth/Throat:      Mouth: Mucous membranes are moist.   Eyes:      Conjunctiva/sclera: Conjunctivae normal.   Cardiovascular:      Rate and Rhythm: Normal rate and regular rhythm.   Pulmonary:      Effort: Pulmonary effort is normal. No respiratory distress.      Breath sounds: No wheezing or rales.   Abdominal:      Palpations: Abdomen is soft.   Musculoskeletal:      Cervical back: Neck supple.      Right lower leg: No edema.      Left lower leg: No edema.   Skin:     General: Skin is warm and dry.   Neurological:      Mental Status: He is alert and oriented to person, place, and time.   Psychiatric:         Mood and Affect: Mood normal.         Behavior: Behavior normal.         Results Review:  I reviewed the patient's new clinical results.  I reviewed the patient's new imaging results and agree with the interpretation.  I reviewed the patient's other test results and agree with the interpretation  I personally viewed and interpreted the patient's EKG/Telemetry data    Lab Results (last 24 hours)     Procedure Component Value Units Date/Time    CBC & Differential [[id number]]  (Abnormal) Collected: 12/05/21 1941    Specimen: Blood Updated: 12/05/21 1951    Narrative:      The following orders were created for panel order CBC & Differential.  Procedure                               Abnormality         Status                     ---------                               -----------         ------                     CBC Auto Differential[[id number]]        Abnormal            Final result                 Please view results for these tests on the individual orders.    Comprehensive Metabolic Panel [[id number]]  (Abnormal) Collected: 12/05/21 1941    Specimen: Blood Updated: 12/05/21 2014     Glucose 102 mg/dL      BUN 24 mg/dL      Creatinine 1.21 mg/dL      Sodium 132 mmol/L      Potassium 4.1 mmol/L      Chloride 96 mmol/L      CO2 21.7 mmol/L      Calcium 8.9 mg/dL      Total Protein 6.2  g/dL      Albumin 3.60 g/dL      ALT (SGPT) 29 U/L      AST (SGOT) 35 U/L      Alkaline Phosphatase 54 U/L      Total Bilirubin 1.3 mg/dL      eGFR Non African Amer 58 mL/min/1.73      Globulin 2.6 gm/dL      A/G Ratio 1.4 g/dL      BUN/Creatinine Ratio 19.8     Anion Gap 14.3 mmol/L     Narrative:      GFR Normal >60  Chronic Kidney Disease <60  Kidney Failure <15      Lipase [[id number]]  (Normal) Collected: 12/05/21 1941    Specimen: Blood Updated: 12/05/21 2014     Lipase 33 U/L     CBC Auto Differential [[id number]]  (Abnormal) Collected: 12/05/21 1941    Specimen: Blood Updated: 12/05/21 1951     WBC 12.11 10*3/mm3      RBC 4.21 10*6/mm3      Hemoglobin 13.0 g/dL      Hematocrit 37.7 %      MCV 89.5 fL      MCH 30.9 pg      MCHC 34.5 g/dL      RDW 12.5 %      RDW-SD 39.9 fl      MPV 9.1 fL      Platelets 302 10*3/mm3      Neutrophil % 82.6 %      Lymphocyte % 5.0 %      Monocyte % 10.8 %      Eosinophil % 0.7 %      Basophil % 0.4 %      Immature Grans % 0.5 %      Neutrophils, Absolute 10.01 10*3/mm3      Lymphocytes, Absolute 0.60 10*3/mm3      Monocytes, Absolute 1.31 10*3/mm3      Eosinophils, Absolute 0.08 10*3/mm3      Basophils, Absolute 0.05 10*3/mm3      Immature Grans, Absolute 0.06 10*3/mm3      nRBC 0.0 /100 WBC     Troponin [[id number]]  (Normal) Collected: 12/05/21 1941    Specimen: Blood Updated: 12/05/21 2227     Troponin T <0.010 ng/mL     Narrative:      Troponin T Reference Range:  <= 0.03 ng/mL-   Negative for AMI  >0.03 ng/mL-     Abnormal for myocardial necrosis.  Clinicians would have to utilize clinical acumen, EKG, Troponin and serial changes to determine if it is an Acute Myocardial Infarction or myocardial injury due to an underlying chronic condition.       Results may be falsely decreased if patient taking Biotin.      Lactic Acid, Plasma [[id number]]  (Normal) Collected: 12/06/21 0121    Specimen: Blood Updated: 12/06/21 0152     Lactate 1.0 mmol/L     Blood Culture - Blood, Hand,  Right [[id number]] Collected: 12/06/21 0121    Specimen: Blood from Hand, Right Updated: 12/06/21 0131    Blood Culture - Blood, Hand, Left [[id number]] Collected: 12/06/21 0126    Specimen: Blood from Hand, Left Updated: 12/06/21 0131    COVID PRE-OP / PRE-PROCEDURE SCREENING ORDER (NO ISOLATION) - Swab, Nasopharynx [[id number]]  (Normal) Collected: 12/06/21 0130    Specimen: Swab from Nasopharynx Updated: 12/06/21 0237    Narrative:      The following orders were created for panel order COVID PRE-OP / PRE-PROCEDURE SCREENING ORDER (NO ISOLATION) - Swab, Nasopharynx.  Procedure                               Abnormality         Status                     ---------                               -----------         ------                     COVID-19,[facility]SE...[[id number]]  Normal              Final result                 Please view results for these tests on the individual orders.    COVID-19,[facility] CEPHEID/PRIYANKA NP SWAB IN TRANSPORT MEDIA 8-12 HR TAT - Swab, Nasopharynx [[id number]]  (Normal) Collected: 12/06/21 0130    Specimen: Swab from Nasopharynx Updated: 12/06/21 0237     COVID19 Not Detected    Narrative:      Fact sheet for providers: https://www.fda.gov/media/162272/download    Fact sheet for patients: https://www.fda.gov/media/406965/download    Test performed by PCR.    Tissue Pathology Exam [[id number]] Collected: 12/06/21 1017    Specimen: Tissue from Gallbladder Updated: 12/06/21 1132    POC Glucose Once [[id number]]  (Abnormal) Collected: 12/06/21 1108    Specimen: Blood Updated: 12/06/21 1110     Glucose 158 mg/dL      Comment: Meter: [id number] : [id number] Villarreal RN             Imaging Results (Last 24 Hours)     Procedure Component Value Units Date/Time    FL Cholangiogram Operative [[id number]] Collected: 12/06/21 1100     Updated: 12/06/21 1123    Narrative:      FLUOROSCOPIC OPERATIVE CHOLANGIOGRAM     CLINICAL INFORMATION: Laparoscopic cholecystectomy.     FLUOROSCOPY TIME:  11 seconds, 59 images.     FINDINGS: Filling of the cystic duct attempted, however persistent  extravasation at the injection site.     This report was finalized on 12/6/2021 11:20 AM by Dr. Kvng Ferrara M.D.        Gallbladder [[id number]] Collected: 12/06/21 0114     Updated: 12/06/21 0118    Narrative:      RIGHT UPPER QUADRANT ULTRASOUND     HISTORY:  Pain. Evaluate for cholecystitis.     COMPARISON: CT abdomen pelvis 12/5/2021     FINDINGS:  The liver exhibits normal echotexture and measures 14.37 m in length..   There is no intra or extrahepatic biliary duct dilatation. The common  duct measures 4mm. The gallbladder wall is thickened measuring 5 mm.  There is stones and sludge within the gallbladder and there is  gallbladder distention. These findings are consistent with  cholecystitis..       Visualized segments the pancreas appear within normal limits.  The right  kidney measures 10.4cm in length and there is no hydronephrosis. There  is no ascites.       Impression:      Findings consistent with cholecystitis with gallbladder  wall thickening and gallstones and sludge as well as distention.     This report was finalized on 12/6/2021 1:15 AM by Dr. Vipin Ambriz M.D.       CT Abdomen Pelvis With Contrast [[id number]] Collected: 12/06/21 0011     Updated: 12/06/21 0040    Narrative:      CT ABDOMEN AND PELVIS WITH IV CONTRAST     HISTORY: Abdominal pain     TECHNIQUE:  CT includes axial imaging from the lung bases to the  trochanters with intravenous contrast and without use of oral contrast.  Data reconstructed in coronal and sagittal planes. Radiation dose  reduction techniques were utilized, including automated exposure control  and exposure modulation based on body size.     COMPARISON: CT abdomen and pelvis 12/30/2010     FINDINGS: There is abnormal gallbladder wall thickening as well as  pericholecystic stranding/inflammation. Within the gallbladder near the  neck there is a 6 mm polyp or faintly  mineralized stone. No biliary  ductal dilatation is evident. Liver, spleen, adrenal glands, pancreas,  right kidney appear normal. There is an exophytic left lower pole  lateral renal low-density lesion that measures 1.9 cm. This contains  internal density that may represent motion artifact though a hyperdense  cyst or solid lesion are also in the differential diagnosis. Sigmoid  diverticulosis is present without evidence for diverticulitis.  Atherosclerotic calcifications are present involving the abdominal aorta  and iliac vasculature. There is no ascites. There is multilevel endplate  spur formation within the thoracic spine.       Impression:      1. Findings are consistent with acute cholecystitis. 6 mm polyp or  poorly mineralized stone near the gallbladder neck. No biliary ductal  dilatation.  2. 1.9 cm left renal exophytic lower pole lesion is denser than  typically seen for a simple cyst though this appearance may be due to  motion related artifact. Hyperdense cyst or solid lesion are also in the  differential diagnosis and this could be further evaluated with renal  sonogram or multiphasic CT.  3. Sigmoid diverticulosis without evidence for diverticulitis. D     Discussed with Dr. Palmer in the emergency department on 12/06/2021 at  12:05 AM.      Radiation dose reduction techniques were utilized, including automated  exposure control and exposure modulation based on body size.     This report was finalized on 12/6/2021 12:37 AM by Dr. Vipin Ambriz M.D.       XR Chest 1 View [[id number]] Collected: 12/05/21 2213     Updated: 12/05/21 2224    Narrative:      CHEST SINGLE VIEW     HISTORY: Altered mental status.     COMPARISON: Two-view chest 12/02/2021.     FINDINGS: Cardiomediastinal silhouette is within normal limits. Lungs  appear clear and there is no evidence for pulmonary edema or pleural  effusion or infiltrate.       Impression:      No evidence for active disease in the chest.     This report was  finalized on 12/5/2021 10:21 PM by Dr. Vipin Ambriz M.D.                 ECG 12 Lead    (Results Pending)        Assessment/Plan     Active Hospital Problems    Diagnosis  POA   • **Acute calculous cholecystitis [K80.00]  Yes   • CKD (chronic kidney disease) stage 2, GFR 60-89 ml/min [N18.2]  Yes   • Renal lesion [N28.9]  Yes   • Anemia [D64.9]  Yes   • Hypertension [I10]  Yes   • Type 2 diabetes mellitus, without long-term current use of insulin (HCC) [E11.9]  Yes      Resolved Hospital Problems   No resolved problems to display.       Mr. Dash is a 76 y.o. male former smoker with a history of DM, CKD, CAD, HTN, HLD, GERD, anemia  who is admitted for acute cholecystitis    -S/P lap cholecystectomy POD 0. Pain/nausea meds. Appreciate Surgery assistance. Advance diet per Dr. Telles's recommendation. Monitor labs. Continue antibiotics. Follow blood cultures  -Monitor BP. At risk for hypotension due to anesthesia, narcotics, hypovolemia, etc  -Monitor BG trends. Hold oral hypoglycemics. Correctional scale insulin. A1C 6.5% 10/2021  -Cr near baseline. Avoid nephrotoxic meds. Noted concern for lt renal lesion on CT; check renal US. Check PVR's due to report of urinary retention in ED    I discussed the patient's findings and my recommendations with patient and nursing staff.    VTE Prophylaxis - SCDs.  Code Status - Full code.       JOHANA Bae  [facility]  12/06/21  14:40 EST

## 2021-12-06 NOTE — ED NOTES
Pt assisted to standing position, pt attempted to void but after several minutes was assisted back to bed. Denies any other needs at this time.     Dixie Still RN  12/06/21 0685

## 2021-12-06 NOTE — OP NOTE
Operative Note :  Didi Telles MD    Danyel Dash  1945    Procedure Date: 12/06/21    Pre-op Diagnosis:  Acute calculus cholecystitis    Post-op Diagnosis:  Acute calculus cholecystitis with gangrene    Procedure:   Laparoscopic cholecystectomy    Surgeon: iDdi Telles MD    Assistant: Yamilet Monzon CSA (Yamilet was responsible for laparoscopic manipulation of the gallbladder during critical portions of the dissection, handling of the laparoscopic camera, closure of all surgical incisions, and application of topical sterile dressings)    Anesthesia:  General (general endotracheal tube)    Estimated Blood Loss: 200ml    Specimens:  Gallbladder    Complications: None    Indications: The patient is a 76-year-old gentleman who came to the emergency room early this morning with a 2-week history of generalized abdominal pain radiating into the epigastrium and chest.  Imaging work-up included a CT and gallbladder ultrasound which were both consistent with acute calculus cholecystitis.  I proposed we go to the operating room urgently today for laparoscopic cholecystectomy with cholangiogram.  He has also been started on empiric antibiotics.  He understands the risks of the procedure include bleeding, possible common bile duct injury, and possible postoperative bile leak.  Despite these risks, he has consented to proceed.    Findings: Gangrenous gallbladder falling apart during the laparoscopic dissection, unable to complete cholangiogram due to reflux of contrast out of the cystic duct every time cholangiogram was tried    Description of procedure:  The patient was brought to the operating room and placed on the OR table in supine position.  An endotracheal tube was inserted, and general anesthesia was induced.  The anterior abdominal wall was shaved, prepped, and draped in a sterile fashion.  A surgical timeout was then completed.  A curvilinear infraumbilical skin incision was made after instillation of  0.5% Marcaine with epinephrine.  The umbilical stalk was grasped and elevated, and a longitudinal fasciotomy made.  A Thomas trocar was inserted and secured with 2 separate 0 Vicryl stay sutures.  The abdomen was then insufflated.  Under direct visualization, 3 additional 5 mm trocars were then placed within the subxiphoid and subcostal space on the right.  The gallbladder was found to be gangrenous and tense due to distention.  The sharp aspirator needle was used to puncture the gallbladder and aspirate approximately 150 cc of thick clear bile consistent with hydrops.  The fundus of the gallbladder was then retracted cephalad but kept falling apart during the dissection which made the entire procedure quite difficult.  The infundibulum of the gallbladder was retracted inferiorly. The cystic duct and cystic artery were slowly dissected out circumferentially until a firm critical view was obtained.  A single Hemoclip was placed across the cystic duct at its junction with the gallbladder infundibulum and 3 hemoclips were placed across the cystic artery.  A small hole was created on the anterior wall of the cystic duct, and a cholangiogram catheter inserted through a right upper quadrant angiocatheter.  The catheter was secured within the duct with an additional Hemoclip and a cholangiogram was then attempted.  As I injected contrast into the duct, there was extravasation into the gallbladder fossa with no contrast filling the bile duct tree.  I then removed the clip and the cholangiogram catheter and tried to milk the cystic duct backwards, noting no clear evidence of cystic duct obstruction or stones in the bile duct.  I inserted the cholangiogram catheter again and secured it with a Hemoclip, but again found that the injected saline and contrast extravasated out of the duct.  After 1 more attempted repositioning with clip placement again, noting that there was still extravasation of contrast I then aborted the  cholangiogram.  The catheter was withdrawn and 2 hemoclips placed across the base of the cystic duct.  The cystic duct and artery were then transected, leaving 2 clips behind on both of the stumps.  The gallbladder was then slowly dissected off of the undersurface of the liver using electrocautery and it was removed from the peritoneal cavity within an Endo Catch bag at the umbilical trocar site.  There was significant bleeding from the gallbladder fossa due to the tissue necrosis and completely obliterated plane between the gallbladder and liver edge.  The gallbladder fossa was copiously irrigated with warm normal saline and careful electrocautery performed along the exposed liver parenchyma to obtain hemostasis.  I placed a 4 x 4 gauze folded up into the gallbladder fossa while the gallbladder was removed and sent off to pathology.  I then reinspected the gallbladder fossa, irrigated another liter of saline, and again performed electrocautery along the exposed liver parenchyma to obtain hemostasis.  At this point, there was no further evidence of bleeding.  The right upper quadrant was again copiously irrigated and the 4 x 4 gauze removed from the peritoneal cavity.  A 19 Danish KHUSHI drain was positioned through the right lateralmost trocar site coiled within the gallbladder fossa.  It was secured to the skin with a 2-0 silk drain stitch.  All other trochars were removed and the abdomen desufflated. The umbilical fascial incision was closed using a new 0 Vicryl figure-of-eight suture, all skin incisions closed with 4-0 Vicryl subcuticular stitches, and then each was dressed with Exofin glue.  The patient was then extubated and transferred to PACU in stable condition.  All counts were correct per nursing.    Recommendations:  Continue IV antibiotics.  Slowly advance diet as tolerated.  The gangrenous nature of his cholecystitis will require a day or 2 of antibiotics before he is ready for discharge to  home.    Didi Telles MD  General, Robotic, and Endoscopic Surgery  [facility]    [address], [address]  [place] [address]  P: [phone]  F: [phone]

## 2021-12-06 NOTE — ED NOTES
Pt states unable to void, then states that he doesn't think he urinated today. MD in to see.     Dixie Still RN  12/05/21 2123

## 2021-12-06 NOTE — ANESTHESIA PREPROCEDURE EVALUATION
Anesthesia Evaluation     Patient summary reviewed and Nursing notes reviewed   NPO Solid Status: > 8 hours  NPO Liquid Status: > 8 hours           Airway   Mallampati: III  Neck ROM: full  Possible difficult intubation  Dental - normal exam     Pulmonary     breath sounds clear to auscultation  (+) a smoker Former,   Cardiovascular     Rhythm: regular    (+) hypertension, CAD, hyperlipidemia,       Neuro/Psych  (+) numbness, psychiatric history Anxiety,     GI/Hepatic/Renal/Endo    (+) obesity,  GERD, GI bleeding , renal disease, diabetes mellitus,     Musculoskeletal     Abdominal   (+) obese,    Substance History      OB/GYN          Other   arthritis,                      Anesthesia Plan    ASA 3     general     intravenous induction     Anesthetic plan, all risks, benefits, and alternatives have been provided, discussed and informed consent has been obtained with: patient.

## 2021-12-06 NOTE — ANESTHESIA POSTPROCEDURE EVALUATION
Patient: Danyel Dash    Procedure Summary     Date: 12/06/21 Room / Location: [facility] OR  / [facility] MAIN OR    Anesthesia Start: 0909 Anesthesia Stop: 1103    Procedure: CHOLECYSTECTOMY LAPAROSCOPIC (N/A Abdomen) Diagnosis:     Surgeons: Didi Telles MD Provider: Adonay Bruce MD    Anesthesia Type: general ASA Status: 3          Anesthesia Type: general    Vitals  Vitals Value Taken Time   /89 12/06/21 1206   Temp 36.8 °C (98.2 °F) 12/06/21 1100   Pulse 83 12/06/21 1212   Resp 18 12/06/21 1115   SpO2 92 % 12/06/21 1212   Vitals shown include unvalidated device data.        Post Anesthesia Care and Evaluation    Patient location during evaluation: PACU  Anesthetic complications: No anesthetic complications

## 2021-12-06 NOTE — ED TRIAGE NOTES
.Pt masked on arrival, staff masked    Pt from home via ems, called for abd/groin pain, abd distention, started a few days ago, told ems no BM since Friday, decreased urinary output, fell yesterday d/t weakness, skin tear to rt elbow

## 2021-12-06 NOTE — CONSULTS
General Surgery Consultation    Consulting Physician: Didi Telles MD  Referring Physician: JOHANA Rowland and Sonia Palmer MD    Reason for consultation: Acute cholecystitis    CC: Abdominal pain    HPI:   The patient is a very pleasant 76 y.o. male that presented to the hospital with centralized abdominal pain that started 2 to 3 weeks ago per his account.  It became much more intense over the last 2 to 3 days located in somewhat of a periumbilical distribution with radiation to the epigastrium and lower chest.  The pain is constant and at times mild, but will intermittently flareup and become quite severe with severity as high as 9 out of 10.  He has had no associated nausea, vomiting, diarrhea, constipation, fever, or chills.  He came to the emergency room last night due to the worsening severity of his pain and had a CT abdomen/pelvis demonstrating gallbladder wall thickening and pericholecystic fluid consistent with acute cholecystitis.  He then had a gallbladder ultrasound which confirmed gallstones as the culprit.  He has struggled with urinary retention and has not voided since Saturday.  He has tried to void while here, and has been unable to do so.    Past Medical History:  Hypertension  Hyperlipidemia  Type 2 diabetes with peripheral neuropathy  Stage III chronic kidney disease  History of rheumatic fever as a child  Lumbar spinal stenosis  Anemia  GERD  Anxiety    Past Surgical History:  Cataract extraction bilaterally  EGD and colonoscopy (December 2016)  Small bowel enteroscopy (July 2017, Dr. Prakash)  Left knee arthroplasty  Right knee arthroscopy  Right shoulder arthroscopy  Tonsillectomy    Medications:  Tylenol 650 mg every 6 hours as needed for pain  Amlodipine 10 mg daily  Aspirin 81 mg daily  Wellbutrin  mg daily  Vitamin D3 1000 units daily  Diclofenac 1% gel as needed  Ferrous sulfate 480 mg daily  Fluticasone nasal spray daily  Hydroxyzine 25 mg 3 times a day as needed for  anxiety  Lisinopril 10 mg daily  Loratadine 10 mg daily  Protonix 40 mg daily  Pioglitazone 45 mg daily  Simvastatin 20 mg daily  Carafate 1 g 4 times daily as needed for abdominal pain  Tramadol 50 mg as needed for pain  Vitamin B12 1000 mcg daily  Vitamin C 500 mg daily    Allergies: No known drug allergies    Social History:  (wife Lea), retired, former smoker but quit in 2000, no regular alcohol use    Family History: Father with history of diverticulitis, mother with history of Alzheimer's dementia    Review of Systems:  Constitutional: denies any weight changes, fatigue, or weakness  Eyes: denies blurred/double vision or scleral icterus  Cardiovascular: denies chest pain, palpitations, or edemas  Respiratory: denies cough, sputum, or dyspnea  Gastrointestinal: Positive for abdominal pain; denies nausea, vomiting, melena, hematochezia, diarrhea, or constipation  Genitourinary: Positive for urinary retention; denies dysuria or hematuria  Endocrine: denies cold intolerance, lethargy, or flushing  Hematologic: denies excessive bruising or bleeding  Musculoskeletal: denies weakness, joint swelling, joint pain, or stiffness  Neurologic: denies seizures, CVA, paresthesia, or peripheral neuropathy  Skin: denies change in nevi, rashes, masses, or jaundice     All other systems reviewed and were negative.    Physical Exam:   Vitals:    12/06/21 0701   BP: 140/61   Pulse: 90   Resp: 18   Temp: 99.9   SpO2: 95     Height: 175 cm  Weight: 109 kg  BMI: 35.59  GENERAL: awake and alert, no acute distress, oriented to person, place, and time  HEENT: normocephalic, atraumatic, no scleral icterus, moist mucous membranes  NECK: Supple, there is no thyromegaly or lymphadenopathy  RESPIRATORY: clear to auscultation, no wheezes, rales or rhonchi, symmetric air entry  CARDIOVASCULAR: regular rate and rhythm    GASTROINTESTINAL: Soft, obese, nondistended, mild epigastric and right upper quadrant tenderness to palpation  without guarding or rebound  MUSCULOSKELETAL: no cyanosis, clubbing, or edema   NEUROLOGIC: alert and oriented, normal speech, cranial nerves 2-12 grossly intact, no focal deficits   SKIN: Moist, warm, no rashes, no jaundice      Diagnostic workup:     Pertinent labs:   Results from last 7 days   Lab Units 12/05/21 1941 12/02/21  1405 12/02/21  1405   WBC 10*3/mm3 12.11*   < > 11.21*   HEMOGLOBIN g/dL 13.0   < > 14.4   HEMATOCRIT % 37.7   < > 42.9   PLATELETS 10*3/mm3 302  --  282    < > = values in this interval not displayed.     Results from last 7 days   Lab Units 12/05/21 1941 12/02/21  1405   SODIUM mmol/L 132* 136   POTASSIUM mmol/L 4.1 4.5   CHLORIDE mmol/L 96* 101   CO2 mmol/L 21.7* 23.4   BUN mg/dL 24* 22   CREATININE mg/dL 1.21 1.07   CALCIUM mg/dL 8.9 8.9   BILIRUBIN mg/dL 1.3* 0.7   ALK PHOS U/L 54 60   ALT (SGPT) U/L 29 12   AST (SGOT) U/L 35 17   GLUCOSE mg/dL 102* 179*       IMAGING:  CT ABD/PELVIS:  IMPRESSION:  1. Findings are consistent with acute cholecystitis. 6 mm polyp or poorly mineralized stone near the gallbladder neck. No biliary ductal dilatation.  2. 1.9 cm left renal exophytic lower pole lesion is denser than typically seen for a simple cyst though this appearance may be due to motion related artifact. Hyperdense cyst or solid lesion are also in the differential diagnosis and this could be further evaluated with renal sonogram or multiphasic CT.  3. Sigmoid diverticulosis without evidence for diverticulitis.    GALLBLADDER ULTRASOUND:  IMPRESSION:  Findings consistent with cholecystitis with gallbladder wall thickening and gallstones and sludge as well as distention.    Assessment and plan:     The patient is a 76 y.o. male with acute calculus cholecystitis    I reviewed the CT scan as well as the gallbladder ultrasound done overnight in the emergency room and have discussed the results with him.  Both imaging modalities demonstrate significant pericholecystic fluid and wall  thickening consistent with acute cholecystitis.  He has a few gallstones noted on ultrasonography.  I have recommended he remain n.p.o., I have started him on empiric antibiotics, and proposed we go to the operating room urgently this morning for laparoscopic cholecystectomy and cholangiogram.  He understands the risks of the procedure include bleeding, possible common bile duct injury, and possible postoperative bile leak.  Moreover, if his cholangiogram demonstrates any evidence of choledocholithiasis, he will then require ERCP tomorrow.  Depending on the severity of his gallbladder inflammation, he will likely need to stay overnight tonight in the hospital for additional IV antibiotics before possible discharge as early as tomorrow.  He expressed understanding and all questions were answered.  He has consented to proceed.    Didi Telles MD  General, Robotic, and Endoscopic Surgery  [facility]    [address], [address]  [place] [address]  P: [phone]  F: [phone]

## 2021-12-06 NOTE — ANESTHESIA PROCEDURE NOTES
Airway  Urgency: elective    Date/Time: 12/6/2021 9:21 AM  Airway not difficult    General Information and Staff    Patient location during procedure: OR  Anesthesiologist: Adonay Bruce MD    Indications and Patient Condition  Indications for airway management: airway protection    Preoxygenated: yes  Mask difficulty assessment: 1 - vent by mask    Final Airway Details  Final airway type: endotracheal airway      Successful airway: ETT  Cuffed: yes   Successful intubation technique: direct laryngoscopy  Facilitating devices/methods: intubating stylet  Endotracheal tube insertion site: oral  Blade: Taco  Blade size: 4  ETT size (mm): 7.5  Cormack-Lehane Classification: grade IIb - view of arytenoids or posterior of glottis only  Placement verified by: chest auscultation   Measured from: lips  ETT/EBT  to lips (cm): 22  Number of attempts at approach: 1  Assessment: lips, teeth, and gum same as pre-op and atraumatic intubation

## 2021-12-06 NOTE — ED NOTES
Pt is still unable to void. Pt has tried standing next to the bed wih assistance, but is unable to void. Pt denies any pelvic/abd discomfort.     Dixie Still, RN  12/06/21 0233

## 2021-12-06 NOTE — PROGRESS NOTES
Pharmacokinetic Consult - Zosyn Dosing    Danyel Dash is a 76 y.o. male who is on [id number] to dose Zosyn for Intra-Abdominal Infection.  Pharmacy dosing Zosyn per SOPHIE GARCIA's APRRANDY   Other antimicrobials: No      Relevant clinical data and objective history reviewed:        There is no height or weight on file to calculate BMI.     He has a past medical history of Anemia, Anxiety, Arthritis, Chronic kidney disease, Coronary artery disease, Diabetes mellitus (HCC), GERD (gastroesophageal reflux disease), GI bleed, Hyperlipidemia, Hypertension, Low back pain, Peripheral neuropathy, Rheumatic fever, and Spinal stenosis, lumbar.    Allergies as of 12/05/2021    (No Known Allergies)     Vital Signs (last 24 hours)         12/04 0700  12/05 0659 12/05 0700 12/06 0649   Most Recent      Temp (°F)     99.9     99.9 (37.7) 12/05 1923    Heart Rate   74 -  97     75 12/06 0401    Resp     18     18 12/05 1923    BP   109/59 -  138/76     125/55 12/06 0401    SpO2 (%)   93 -  96     94 12/06 0401          Estimated Creatinine Clearance: 64.2 mL/min (by C-G formula based on SCr of 1.21 mg/dL).  Results from last 7 days   Lab Units 12/05/21  1941 12/02/21  1405   CREATININE mg/dL 1.21 1.07     Results from last 7 days   Lab Units 12/05/21  1941 12/02/21  1405   WBC 10*3/mm3 12.11* 11.21*     Baseline culture/source/susceptibility:   BCx2 pending     Imaging:  Chest X ray clear          Anti-Infectives (From admission, onward)      Ordered     Dose/Rate Route Frequency Start Stop    12/06/21 0647  piperacillin-tazobactam (ZOSYN) 3.375 g in iso-osmotic dextrose 50 ml (premix)        Ordering Provider: Sophie Garcia APRN    3.375 g  over 4 Hours Intravenous Every 8 Hours 12/06/21 1331 12/11/21 1330    12/06/21 0547  Pharmacy to Dose Zosyn        Ordering Provider: Sophie Garcia APRN     Does not apply Continuous PRN 12/06/21 0546 12/11/21 0545    12/06/21 0008  piperacillin-tazobactam (ZOSYN) 3.375 g  in iso-osmotic dextrose 50 ml (premix)        Ordering Provider: Sonia Palmer MD    3.375 g Intravenous Once 12/06/21 0010 12/06/21 0233             Assessment/Plan  Zosyn 3.375 mg IV q8h over 4h extended infusion as per [facility] dosing guidelines as estimated CrCl 64.2 at this time    Pharmacy will continue to follow daily while on Zosyn and adjust as needed.     Thanks, Yoandy Pineda, [facility]

## 2021-12-06 NOTE — ED NOTES
Patient was placed in face mask in first look. Patient was wearing facemask when I entered the room and throughout our encounter. I wore full protective equipment throughout this patient encounter including a face mask, and gloves. Hand hygiene was performed before donning protective equipment and after removal when leaving the room.     Dixie Still, RN  12/05/21 5750

## 2021-12-06 NOTE — ED NOTES
Pt resting quietly in no acute distress watching TV.  Denies needs at this time.      Fay Bray RN  12/06/21 8182

## 2021-12-07 ENCOUNTER — APPOINTMENT (OUTPATIENT)
Dept: ULTRASOUND IMAGING | Facility: HOSPITAL | Age: 76
End: 2021-12-07

## 2021-12-07 LAB
ALBUMIN SERPL-MCNC: 3.1 G/DL (ref 3.5–5.2)
ALBUMIN/GLOB SERPL: 1.2 G/DL
ALP SERPL-CCNC: 45 U/L (ref 39–117)
ALT SERPL W P-5'-P-CCNC: 66 U/L (ref 1–41)
ANION GAP SERPL CALCULATED.3IONS-SCNC: 11.3 MMOL/L (ref 5–15)
AST SERPL-CCNC: 69 U/L (ref 1–40)
BASOPHILS # BLD AUTO: 0.01 10*3/MM3 (ref 0–0.2)
BASOPHILS NFR BLD AUTO: 0.1 % (ref 0–1.5)
BILIRUB SERPL-MCNC: 0.6 MG/DL (ref 0–1.2)
BUN SERPL-MCNC: 24 MG/DL (ref 8–23)
BUN/CREAT SERPL: 24.5 (ref 7–25)
CALCIUM SPEC-SCNC: 8.5 MG/DL (ref 8.6–10.5)
CHLORIDE SERPL-SCNC: 100 MMOL/L (ref 98–107)
CO2 SERPL-SCNC: 21.7 MMOL/L (ref 22–29)
CREAT SERPL-MCNC: 0.98 MG/DL (ref 0.76–1.27)
DEPRECATED RDW RBC AUTO: 41.7 FL (ref 37–54)
EOSINOPHIL # BLD AUTO: 0 10*3/MM3 (ref 0–0.4)
EOSINOPHIL NFR BLD AUTO: 0 % (ref 0.3–6.2)
ERYTHROCYTE [DISTWIDTH] IN BLOOD BY AUTOMATED COUNT: 12.7 % (ref 12.3–15.4)
GFR SERPL CREATININE-BSD FRML MDRD: 75 ML/MIN/1.73
GLOBULIN UR ELPH-MCNC: 2.5 GM/DL
GLUCOSE BLDC GLUCOMTR-MCNC: 128 MG/DL (ref 70–130)
GLUCOSE BLDC GLUCOMTR-MCNC: 131 MG/DL (ref 70–130)
GLUCOSE BLDC GLUCOMTR-MCNC: 158 MG/DL (ref 70–130)
GLUCOSE BLDC GLUCOMTR-MCNC: 164 MG/DL (ref 70–130)
GLUCOSE SERPL-MCNC: 146 MG/DL (ref 65–99)
HCT VFR BLD AUTO: 34.1 % (ref 37.5–51)
HGB BLD-MCNC: 11.4 G/DL (ref 13–17.7)
IMM GRANULOCYTES # BLD AUTO: 0.08 10*3/MM3 (ref 0–0.05)
IMM GRANULOCYTES NFR BLD AUTO: 0.7 % (ref 0–0.5)
LAB AP CASE REPORT: NORMAL
LYMPHOCYTES # BLD AUTO: 0.68 10*3/MM3 (ref 0.7–3.1)
LYMPHOCYTES NFR BLD AUTO: 5.6 % (ref 19.6–45.3)
MAGNESIUM SERPL-MCNC: 2.1 MG/DL (ref 1.6–2.4)
MCH RBC QN AUTO: 30.3 PG (ref 26.6–33)
MCHC RBC AUTO-ENTMCNC: 33.4 G/DL (ref 31.5–35.7)
MCV RBC AUTO: 90.7 FL (ref 79–97)
MONOCYTES # BLD AUTO: 0.68 10*3/MM3 (ref 0.1–0.9)
MONOCYTES NFR BLD AUTO: 5.6 % (ref 5–12)
NEUTROPHILS NFR BLD AUTO: 10.61 10*3/MM3 (ref 1.7–7)
NEUTROPHILS NFR BLD AUTO: 88 % (ref 42.7–76)
NRBC BLD AUTO-RTO: 0 /100 WBC (ref 0–0.2)
PATH REPORT.FINAL DX SPEC: NORMAL
PATH REPORT.GROSS SPEC: NORMAL
PLATELET # BLD AUTO: 339 10*3/MM3 (ref 140–450)
PMV BLD AUTO: 9.3 FL (ref 6–12)
POTASSIUM SERPL-SCNC: 4 MMOL/L (ref 3.5–5.2)
PROT SERPL-MCNC: 5.6 G/DL (ref 6–8.5)
RBC # BLD AUTO: 3.76 10*6/MM3 (ref 4.14–5.8)
SODIUM SERPL-SCNC: 133 MMOL/L (ref 136–145)
URATE SERPL-MCNC: 6.5 MG/DL (ref 3.4–7)
WBC NRBC COR # BLD: 12.06 10*3/MM3 (ref 3.4–10.8)

## 2021-12-07 PROCEDURE — 63710000001 ASCORBIC ACID 500 MG TABLET: Performed by: NURSE PRACTITIONER

## 2021-12-07 PROCEDURE — 63710000001 AMLODIPINE 10 MG TABLET: Performed by: NURSE PRACTITIONER

## 2021-12-07 PROCEDURE — A9270 NON-COVERED ITEM OR SERVICE: HCPCS | Performed by: NURSE PRACTITIONER

## 2021-12-07 PROCEDURE — 82962 GLUCOSE BLOOD TEST: CPT

## 2021-12-07 PROCEDURE — 63710000001 VITAMIN B-12 500 MCG TABLET: Performed by: NURSE PRACTITIONER

## 2021-12-07 PROCEDURE — 63710000001 LISINOPRIL 10 MG TABLET: Performed by: NURSE PRACTITIONER

## 2021-12-07 PROCEDURE — 25010000002 PIPERACILLIN SOD-TAZOBACTAM PER 1 G: Performed by: SURGERY

## 2021-12-07 PROCEDURE — 63710000001 OXYCODONE-ACETAMINOPHEN 5-325 MG TABLET: Performed by: SURGERY

## 2021-12-07 PROCEDURE — 99024 POSTOP FOLLOW-UP VISIT: CPT | Performed by: SURGERY

## 2021-12-07 PROCEDURE — A9270 NON-COVERED ITEM OR SERVICE: HCPCS | Performed by: SURGERY

## 2021-12-07 PROCEDURE — 63710000001 ATORVASTATIN 20 MG TABLET: Performed by: NURSE PRACTITIONER

## 2021-12-07 PROCEDURE — 63710000001 TAMSULOSIN 0.4 MG CAPSULE: Performed by: UROLOGY

## 2021-12-07 PROCEDURE — 63710000001 BUPROPION XL 300 MG TABLET SUSTAINED-RELEASE 24 HOUR: Performed by: NURSE PRACTITIONER

## 2021-12-07 PROCEDURE — A9270 NON-COVERED ITEM OR SERVICE: HCPCS | Performed by: UROLOGY

## 2021-12-07 PROCEDURE — G0378 HOSPITAL OBSERVATION PER HR: HCPCS

## 2021-12-07 PROCEDURE — 84550 ASSAY OF BLOOD/URIC ACID: CPT | Performed by: HOSPITALIST

## 2021-12-07 PROCEDURE — 63710000001 PANTOPRAZOLE 40 MG TABLET DELAYED-RELEASE: Performed by: NURSE PRACTITIONER

## 2021-12-07 PROCEDURE — 63710000001 CHOLECALCIFEROL 25 MCG (1000 UT) TABLET: Performed by: NURSE PRACTITIONER

## 2021-12-07 PROCEDURE — 83735 ASSAY OF MAGNESIUM: CPT | Performed by: HOSPITALIST

## 2021-12-07 PROCEDURE — 80053 COMPREHEN METABOLIC PANEL: CPT | Performed by: HOSPITALIST

## 2021-12-07 PROCEDURE — 63710000001 CETIRIZINE 10 MG TABLET: Performed by: NURSE PRACTITIONER

## 2021-12-07 PROCEDURE — 63710000001 ASPIRIN 81 MG TABLET DELAYED-RELEASE: Performed by: NURSE PRACTITIONER

## 2021-12-07 PROCEDURE — 76775 US EXAM ABDO BACK WALL LIM: CPT

## 2021-12-07 PROCEDURE — 63710000001 FLUTICASONE 50 MCG/ACT SUSPENSION 16 G BOTTLE: Performed by: NURSE PRACTITIONER

## 2021-12-07 PROCEDURE — 85025 COMPLETE CBC W/AUTO DIFF WBC: CPT | Performed by: HOSPITALIST

## 2021-12-07 RX ORDER — TAMSULOSIN HYDROCHLORIDE 0.4 MG/1
0.4 CAPSULE ORAL DAILY
Status: DISCONTINUED | OUTPATIENT
Start: 2021-12-07 | End: 2021-12-08 | Stop reason: HOSPADM

## 2021-12-07 RX ADMIN — PANTOPRAZOLE SODIUM 40 MG: 40 TABLET, DELAYED RELEASE ORAL at 06:50

## 2021-12-07 RX ADMIN — ASPIRIN 81 MG: 81 TABLET, COATED ORAL at 08:22

## 2021-12-07 RX ADMIN — TAZOBACTAM SODIUM AND PIPERACILLIN SODIUM 3.38 G: 375; 3 INJECTION, SOLUTION INTRAVENOUS at 23:16

## 2021-12-07 RX ADMIN — OXYCODONE HYDROCHLORIDE AND ACETAMINOPHEN 500 MG: 500 TABLET ORAL at 08:22

## 2021-12-07 RX ADMIN — FLUTICASONE PROPIONATE 2 SPRAY: 50 SPRAY, METERED NASAL at 09:37

## 2021-12-07 RX ADMIN — TAMSULOSIN HYDROCHLORIDE 0.4 MG: 0.4 CAPSULE ORAL at 12:03

## 2021-12-07 RX ADMIN — CETIRIZINE HYDROCHLORIDE 10 MG: 10 TABLET ORAL at 08:22

## 2021-12-07 RX ADMIN — BUPROPION HYDROCHLORIDE 300 MG: 300 TABLET, EXTENDED RELEASE ORAL at 08:22

## 2021-12-07 RX ADMIN — ATORVASTATIN CALCIUM 10 MG: 20 TABLET, FILM COATED ORAL at 21:45

## 2021-12-07 RX ADMIN — OXYCODONE AND ACETAMINOPHEN 1 TABLET: 5; 325 TABLET ORAL at 14:23

## 2021-12-07 RX ADMIN — LISINOPRIL 10 MG: 20 TABLET ORAL at 08:22

## 2021-12-07 RX ADMIN — Medication 1000 MCG: at 12:03

## 2021-12-07 RX ADMIN — AMLODIPINE BESYLATE 10 MG: 10 TABLET ORAL at 08:22

## 2021-12-07 RX ADMIN — TAZOBACTAM SODIUM AND PIPERACILLIN SODIUM 3.38 G: 375; 3 INJECTION, SOLUTION INTRAVENOUS at 09:05

## 2021-12-07 RX ADMIN — Medication 1000 UNITS: at 08:22

## 2021-12-07 RX ADMIN — TAZOBACTAM SODIUM AND PIPERACILLIN SODIUM 3.38 G: 375; 3 INJECTION, SOLUTION INTRAVENOUS at 15:31

## 2021-12-07 NOTE — PLAN OF CARE
Goal Outcome Evaluation:  Plan of Care Reviewed With: patient        Progress: improving  Pt AxOx4, calm and cooperative, VSS. IV antibiotics given. Pt ambulated in hallway twice today, x1 assist. Pt is using his insensitive spirometer. Family at bedside this afternoon. Diet advanced to full liquids today. No complains of nausea. Pt having urinary retention RN straight cathed pt at 1317 output 760 ml. RN will continue to monitor.

## 2021-12-07 NOTE — CASE MANAGEMENT/SOCIAL WORK
Discharge Planning Assessment  [facility]     Patient Name: Danyel Dash  MRN: [id number]  Today's Date: 12/7/2021    Admit Date: 12/5/2021     Discharge Needs Assessment     Row Name 12/07/21 1309       Living Environment    Lives With spouse    Current Living Arrangements home/apartment/condo    Primary Care Provided by self    Provides Primary Care For no one    Family Caregiver if Needed spouse    Quality of Family Relationships helpful; involved    Able to Return to Prior Arrangements yes       Resource/Environmental Concerns    Resource/Environmental Concerns home accessibility    Home Accessibility Concerns stairs to access bedroom or bathroom       Transition Planning    Patient/Family Anticipates Transition to home with family    Patient/Family Anticipated Services at Transition none    Transportation Anticipated family or friend will provide       Discharge Needs Assessment    Readmission Within the Last 30 Days no previous admission in last 30 days    Equipment Currently Used at Home none    Concerns to be Addressed no discharge needs identified; denies needs/concerns at this time    Anticipated Changes Related to Illness none    Equipment Needed After Discharge none    Provided Post Acute Provider List? Refused    Refused Provider List Comment declined               Discharge Plan     Row Name 12/07/21 1310       Plan    Plan home with wife    Patient/Family in Agreement with Plan yes    Plan Comments Spoke with pt’s wife via phone in room. Confirmed facesheet correct. Explained role of CCP. Pt lives with spouse in a house with bedroom upstairs. Pt is IADLs, uses a cane to ambulate. Pt has never HH or SNF. Verified PCP and pharmacy, agreeable to meds to beds. Pt plans home. Denies needs. CCP to follow.              Continued Care and Services - Admitted Since 12/5/2021    Coordination has not been started for this encounter.          Demographic Summary     Row Name 12/07/21 1308       General  Information    Admission Type observation    Arrived From home    Required Notices Provided Observation Status Notice    Reason for Consult discharge planning    Preferred Language English     Used During This Interaction no       Contact Information    Permission Granted to Share Info With facility ; family/designee               Functional Status     Row Name 12/07/21 1302       Functional Status    Usual Activity Tolerance good    Current Activity Tolerance moderate       Functional Status, IADL    Medications independent    Meal Preparation independent    Housekeeping independent    Laundry independent    Shopping independent               Psychosocial    No documentation.                Abuse/Neglect    No documentation.                Legal    No documentation.                Substance Abuse    No documentation.                Patient Forms    No documentation.                   VERONICA Tinajero

## 2021-12-07 NOTE — PROGRESS NOTES
[facility]    [phone]   LOS: 0 days     Name: Danyel Dash  Age/Sex: 76 y.o. male  :  1945        PCP: Dereje Loya APRN  Chief Complaint   Patient presents with   • Abdominal Pain   • Weakness - Generalized      Subjective   His pain is controlled he is denying any nausea vomiting or abdominal pain presently.  Still having issues with urinary retention  General: No Fever or Chills, Cardiac: No Chest Pain or Palpitations, Resp: No Cough or SOA, GI: No Nausea, Vomiting, or Diarrhea and Other: No bleeding    amLODIPine, 10 mg, Oral, Daily  ascorbic acid, 500 mg, Oral, Daily  aspirin, 81 mg, Oral, Daily  atorvastatin, 10 mg, Oral, Nightly  buPROPion XL, 300 mg, Oral, Daily  cetirizine, 10 mg, Oral, Daily  cholecalciferol, 1,000 Units, Oral, Daily  fluticasone, 2 spray, Nasal, Daily  insulin lispro, 0-9 Units, Subcutaneous, TID AC  lisinopril, 10 mg, Oral, Daily  pantoprazole, 40 mg, Oral, QAM  piperacillin-tazobactam, 3.375 g, Intravenous, Q8H  tamsulosin, 0.4 mg, Oral, Daily  vitamin B-12, 1,000 mcg, Oral, Daily      sodium chloride, 50 mL/hr, Last Rate: 50 mL/hr (21 1204)        Objective   Vital Signs  Temp:  [97.6 °F (36.4 °C)-98.2 °F (36.8 °C)] 98.2 °F (36.8 °C)  Heart Rate:  [73-90] 73  Resp:  [16-20] 20  BP: (128-165)/(59-82) 139/66  There is no height or weight on file to calculate BMI.    Intake/Output Summary (Last 24 hours) at 2021 1230  Last data filed at 2021 0935  Gross per 24 hour   Intake 1240 ml   Output 750 ml   Net 490 ml       Physical Exam  Vitals and nursing note reviewed.   Constitutional:       Appearance: Normal appearance.   Cardiovascular:      Rate and Rhythm: Normal rate and regular rhythm.   Pulmonary:      Breath sounds: Normal breath sounds.   Abdominal:      General: Bowel sounds are normal.      Palpations: Abdomen is soft.   Skin:     General: Skin is warm and dry.   Neurological:      General: No focal deficit present.      Mental  Status: He is alert and oriented to person, place, and time.   Psychiatric:         Mood and Affect: Mood normal.         Behavior: Behavior normal.           Results Review:       I reviewed the patient's new clinical results.  Results from last 7 days   Lab Units 12/07/21  0916 12/05/21 1941 12/02/21  1405   WBC 10*3/mm3 12.06* 12.11* 11.21*   HEMOGLOBIN g/dL 11.4* 13.0 14.4   PLATELETS 10*3/mm3 339 302 282     Results from last 7 days   Lab Units 12/07/21  0916 12/05/21 1941 12/02/21  1405   SODIUM mmol/L 133* 132* 136   POTASSIUM mmol/L 4.0 4.1 4.5   CHLORIDE mmol/L 100 96* 101   CO2 mmol/L 21.7* 21.7* 23.4   BUN mg/dL 24* 24* 22   CREATININE mg/dL 0.98 1.21 1.07   CALCIUM mg/dL 8.5* 8.9 8.9   MAGNESIUM mg/dL 2.1  --   --    Estimated Creatinine Clearance: 79.2 mL/min (by C-G formula based on SCr of 0.98 mg/dL).  Lab Results   Component Value Date    HGBA1C 6.5 10/08/2021    HGBA1C 6.2 04/08/2021    HGBA1C 6.5 05/29/2020     Glucose   Date/Time Value Ref Range Status   12/07/2021 1233 131 (H) 70 - 130 mg/dL Final     Comment:     Meter: [id number] : [id number] Chun ORDOÑEZ NA   12/07/2021 0612 128 70 - 130 mg/dL Final     Comment:     Meter: [id number] : [id number] Johnny Ni NA   12/06/2021 2041 167 (H) 70 - 130 mg/dL Final     Comment:     Meter: [id number] : [id number] Turner Raine NA   12/06/2021 1654 187 (H) 70 - 130 mg/dL Final     Comment:     RN Notified R and V Meter: [id number] : [id number] Zion ALMENDAREZ   12/06/2021 1108 158 (H) 70 - 130 mg/dL Final     Comment:     Meter: [id number] : [id number] Alana Villarreal RN         Assessment/Plan   Active Hospital Problems    Diagnosis  POA   • **Acute calculous cholecystitis [K80.00]  Yes   • CKD (chronic kidney disease) stage 2, GFR 60-89 ml/min [N18.2]  Yes   • Renal lesion [N28.9]  Yes   • Anemia [D64.9]  Yes   • Hypertension [I10]  Yes   • Type 2 diabetes mellitus, without long-term current use of insulin (HCC) [E11.9]   Yes      Resolved Hospital Problems   No resolved problems to display.       PLAN  This is a 76-year-old gentleman who presented to the hospital with nausea vomiting and feeling sick and was found to have a calculus cholecystitis and urinary retention  -He is postop day 1 from laparoscopic cholecystectomy procedure tolerated well  -Advance to full liquid diet  -LFTs are mildly elevated this is expected following the procedure  -Blood sugar stable on present management  -Continue routine postoperative care.  Remains on IV antibiotics will discuss with general surgery today whether they think we need to continue these  -Still having issues with urinary retention Flomax was initiated.  May need to consider indwelling Estrada catheter at discharge if unable to void independently.  -Since he is tolerating a diet his pain is controlled he be stable for discharge from my perspective.      Disposition  To be determined awaiting final recommendations from general surgery      Chandu Villarreal MD  [facility]  12/07/21  12:30 EST

## 2021-12-07 NOTE — PLAN OF CARE
Goal Outcome Evaluation:  Plan of Care Reviewed With: patient        Progress: no change  Outcome Summary: VSS. IVF, abx, accuchecks, SSI per orders. Unable to void with bladder scan of 650 at midnight. I/O cath, 700 out. Ambulated in room. Denies need for pain meds. Will monitor

## 2021-12-07 NOTE — PROGRESS NOTES
LOS: 0 days   Patient Care Team:  Dereje Loya APRN as PCP - General (Family Medicine)  Perlita Sandoval MD as Consulting Physician (Hematology and Oncology)  Anil Prakash MD as Referring Physician (Gastroenterology)  Danyel Valencia MD as Consulting Physician (Hematology and Oncology)      Subjective   Interval History: POD 1 from cholecystectomy  VSS  Retention  On IC  Will add flomax    Objective         Vital Signs  Temp:  [97.6 °F (36.4 °C)-98.2 °F (36.8 °C)] 98.2 °F (36.8 °C)  Heart Rate:  [77-90] 77  Resp:  [16-18] 16  BP: (123-165)/(57-89) 129/59      Intake/Output Summary (Last 24 hours) at 12/7/2021 0859  Last data filed at 12/7/2021 0723  Gross per 24 hour   Intake 1260 ml   Output 1400 ml   Net -140 ml           Physical Exam:     General appearance: alert, cooperative, oriented  Lungs:  Clear, No acute distress  Heart:  Regular, rate and rhythm  ABD: soft and nt  Genitalia: normal  Extremities: normal, no edema  Skin:  Skin color, texture, turgor normal, no rashes        Results Review:       Lab Results (all)     Procedure Component Value Units Date/Time    POC Glucose Once [[id number]]  (Normal) Collected: 12/07/21 0612    Specimen: Blood Updated: 12/07/21 0614     Glucose 128 mg/dL      Comment: Meter: [id number] : [id number] [employer] NA       Blood Culture - Blood, Hand, Left [[id number]]  (Normal) Collected: 12/06/21 0126    Specimen: Blood from Hand, Left Updated: 12/07/21 0145     Blood Culture No growth at 24 hours    Blood Culture - Blood, Hand, Right [[id number]]  (Normal) Collected: 12/06/21 0121    Specimen: Blood from Hand, Right Updated: 12/07/21 0145     Blood Culture No growth at 24 hours    POC Glucose Once [[id number]]  (Abnormal) Collected: 12/06/21 2041    Specimen: Blood Updated: 12/06/21 2042     Glucose 167 mg/dL      Comment: Meter: [id number] : [id number] [employer] NA       POC Glucose Once [[id number]]  (Abnormal) Collected: 12/06/21 3264     Specimen: Blood Updated: 12/06/21 1656     Glucose 187 mg/dL      Comment: RN Notified R and V Meter: [id number] : [id number] Zion ALMENDAREZ       Tissue Pathology Exam [[id number]] Collected: 12/06/21 1017    Specimen: Tissue from Gallbladder Updated: 12/06/21 1132    POC Glucose Once [[id number]]  (Abnormal) Collected: 12/06/21 1108    Specimen: Blood Updated: 12/06/21 1110     Glucose 158 mg/dL      Comment: Meter: [id number] : [id number] Alana Villarreal RN       COVID PRE-OP / PRE-PROCEDURE SCREENING ORDER (NO ISOLATION) - Swab, Nasopharynx [[id number]]  (Normal) Collected: 12/06/21 0130    Specimen: Swab from Nasopharynx Updated: 12/06/21 0237    Narrative:      The following orders were created for panel order COVID PRE-OP / PRE-PROCEDURE SCREENING ORDER (NO ISOLATION) - Swab, Nasopharynx.  Procedure                               Abnormality         Status                     ---------                               -----------         ------                     COVID-19,[facility]...[[id number]]  Normal              Final result                 Please view results for these tests on the individual orders.    COVID-19,[facility] CEPHEID/PRIYANKA NP SWAB IN TRANSPORT MEDIA 8-12 HR TAT - Swab, Nasopharynx [[id number]]  (Normal) Collected: 12/06/21 0130    Specimen: Swab from Nasopharynx Updated: 12/06/21 0237     COVID19 Not Detected    Narrative:      Fact sheet for providers: https://www.fda.gov/media/252352/download    Fact sheet for patients: https://www.fda.gov/media/639883/download    Test performed by PCR.    Lactic Acid, Plasma [[id number]]  (Normal) Collected: 12/06/21 0121    Specimen: Blood Updated: 12/06/21 0152     Lactate 1.0 mmol/L     Troponin [[id number]]  (Normal) Collected: 12/05/21 1941    Specimen: Blood Updated: 12/05/21 2227     Troponin T <0.010 ng/mL     Narrative:      Troponin T Reference Range:  <= 0.03 ng/mL-   Negative for AMI  >0.03 ng/mL-     Abnormal for myocardial necrosis.   Clinicians would have to utilize clinical acumen, EKG, Troponin and serial changes to determine if it is an Acute Myocardial Infarction or myocardial injury due to an underlying chronic condition.       Results may be falsely decreased if patient taking Biotin.      [facility] Draw [[id number]] Collected: 12/05/21 1940    Specimen: Blood Updated: 12/05/21 2045    Narrative:      The following orders were created for panel order [facility] Draw.  Procedure                               Abnormality         Status                     ---------                               -----------         ------                     Green Top (Gel)[[id number]]                                  Final result               Lavender Top[[id number]]                                     Final result               Gold Top - SST[[id number]]                                   Final result               Light Blue Top[[id number]]                                   Final result                 Please view results for these tests on the individual orders.    Gold Top - SST [[id number]] Collected: 12/05/21 1940    Specimen: Blood Updated: 12/05/21 2045     Extra Tube Hold for add-ons.     Comment: Auto resulted.       Green Top (Gel) [[id number]] Collected: 12/05/21 1941    Specimen: Blood Updated: 12/05/21 2045     Extra Tube Hold for add-ons.     Comment: Auto resulted.       Light Blue Top [[id number]] Collected: 12/05/21 1940    Specimen: Blood Updated: 12/05/21 2045     Extra Tube hold for add-on     Comment: Auto resulted       Lavender Top [[id number]] Collected: 12/05/21 1941    Specimen: Blood Updated: 12/05/21 2045     Extra Tube hold for add-on     Comment: Auto resulted       Comprehensive Metabolic Panel [[id number]]  (Abnormal) Collected: 12/05/21 1941    Specimen: Blood Updated: 12/05/21 2014     Glucose 102 mg/dL      BUN 24 mg/dL      Creatinine 1.21 mg/dL      Sodium 132 mmol/L      Potassium 4.1 mmol/L      Chloride 96 mmol/L      CO2 21.7  mmol/L      Calcium 8.9 mg/dL      Total Protein 6.2 g/dL      Albumin 3.60 g/dL      ALT (SGPT) 29 U/L      AST (SGOT) 35 U/L      Alkaline Phosphatase 54 U/L      Total Bilirubin 1.3 mg/dL      eGFR Non African Amer 58 mL/min/1.73      Globulin 2.6 gm/dL      A/G Ratio 1.4 g/dL      BUN/Creatinine Ratio 19.8     Anion Gap 14.3 mmol/L     Narrative:      GFR Normal >60  Chronic Kidney Disease <60  Kidney Failure <15      Lipase [[id number]]  (Normal) Collected: 12/05/21 1941    Specimen: Blood Updated: 12/05/21 2014     Lipase 33 U/L     CBC & Differential [[id number]]  (Abnormal) Collected: 12/05/21 1941    Specimen: Blood Updated: 12/05/21 1951    Narrative:      The following orders were created for panel order CBC & Differential.  Procedure                               Abnormality         Status                     ---------                               -----------         ------                     CBC Auto Differential[[id number]]        Abnormal            Final result                 Please view results for these tests on the individual orders.    CBC Auto Differential [[id number]]  (Abnormal) Collected: 12/05/21 1941    Specimen: Blood Updated: 12/05/21 1951     WBC 12.11 10*3/mm3      RBC 4.21 10*6/mm3      Hemoglobin 13.0 g/dL      Hematocrit 37.7 %      MCV 89.5 fL      MCH 30.9 pg      MCHC 34.5 g/dL      RDW 12.5 %      RDW-SD 39.9 fl      MPV 9.1 fL      Platelets 302 10*3/mm3      Neutrophil % 82.6 %      Lymphocyte % 5.0 %      Monocyte % 10.8 %      Eosinophil % 0.7 %      Basophil % 0.4 %      Immature Grans % 0.5 %      Neutrophils, Absolute 10.01 10*3/mm3      Lymphocytes, Absolute 0.60 10*3/mm3      Monocytes, Absolute 1.31 10*3/mm3      Eosinophils, Absolute 0.08 10*3/mm3      Basophils, Absolute 0.05 10*3/mm3      Immature Grans, Absolute 0.06 10*3/mm3      nRBC 0.0 /100 WBC           Imaging Results (All)     Procedure Component Value Units Date/Time    US Renal Bilateral [[id number]]  Collected: 12/07/21 0643     Updated: 12/07/21 0755    Narrative:      US RENAL BILATERAL-     CLINICAL: Indeterminate left renal nodule.     COMPARISON: Ultrasound 12/06/2021, CT abdomen 12/05/2021.     FINDINGS: The right kidney is 11.5 cm in length, the left kidney is 10.8  cm in length. The right kidney is normal in appearance. Arising from the  lower pole of the left kidney there is a hypoechoic nodule with through  enhancement and low level internal echoes. It measures 19 x 18 x 18 mm.  Likely complex proteinaceous cyst. Advise short-term follow-up  ultrasound in 6 months for further evaluation. Left kidney is otherwise  within normal limits. Limited images of the bladder failed to  demonstrate either the right or left ureteral jet. No renal calculus nor  obstructive uropathy seen.     CONCLUSION: Nodule arising from the lower pole of the left kidney likely  represents a complex proteinaceous cyst, 6 month follow-up ultrasound  recommended.     This report was finalized on 12/7/2021 7:51 AM by Dr. Kvng Ferrara M.D.       FL Cholangiogram Operative [[id number]] Collected: 12/06/21 1100     Updated: 12/06/21 1123    Narrative:      FLUOROSCOPIC OPERATIVE CHOLANGIOGRAM     CLINICAL INFORMATION: Laparoscopic cholecystectomy.     FLUOROSCOPY TIME: 11 seconds, 59 images.     FINDINGS: Filling of the cystic duct attempted, however persistent  extravasation at the injection site.     This report was finalized on 12/6/2021 11:20 AM by Dr. Kvng Ferrara M.D.       US Gallbladder [[id number]] Collected: 12/06/21 0114     Updated: 12/06/21 0118    Narrative:      RIGHT UPPER QUADRANT ULTRASOUND     HISTORY:  Pain. Evaluate for cholecystitis.     COMPARISON: CT abdomen pelvis 12/5/2021     FINDINGS:  The liver exhibits normal echotexture and measures 14.37 m in length..   There is no intra or extrahepatic biliary duct dilatation. The common  duct measures 4mm. The gallbladder wall is thickened measuring 5 mm.  There is  stones and sludge within the gallbladder and there is  gallbladder distention. These findings are consistent with  cholecystitis..       Visualized segments the pancreas appear within normal limits.  The right  kidney measures 10.4cm in length and there is no hydronephrosis. There  is no ascites.       Impression:      Findings consistent with cholecystitis with gallbladder  wall thickening and gallstones and sludge as well as distention.     This report was finalized on 12/6/2021 1:15 AM by Dr. Vipin Ambriz M.D.       CT Abdomen Pelvis With Contrast [[id number]] Collected: 12/06/21 0011     Updated: 12/06/21 0040    Narrative:      CT ABDOMEN AND PELVIS WITH IV CONTRAST     HISTORY: Abdominal pain     TECHNIQUE:  CT includes axial imaging from the lung bases to the  trochanters with intravenous contrast and without use of oral contrast.  Data reconstructed in coronal and sagittal planes. Radiation dose  reduction techniques were utilized, including automated exposure control  and exposure modulation based on body size.     COMPARISON: CT abdomen and pelvis 12/30/2010     FINDINGS: There is abnormal gallbladder wall thickening as well as  pericholecystic stranding/inflammation. Within the gallbladder near the  neck there is a 6 mm polyp or faintly mineralized stone. No biliary  ductal dilatation is evident. Liver, spleen, adrenal glands, pancreas,  right kidney appear normal. There is an exophytic left lower pole  lateral renal low-density lesion that measures 1.9 cm. This contains  internal density that may represent motion artifact though a hyperdense  cyst or solid lesion are also in the differential diagnosis. Sigmoid  diverticulosis is present without evidence for diverticulitis.  Atherosclerotic calcifications are present involving the abdominal aorta  and iliac vasculature. There is no ascites. There is multilevel endplate  spur formation within the thoracic spine.       Impression:      1. Findings are  consistent with acute cholecystitis. 6 mm polyp or  poorly mineralized stone near the gallbladder neck. No biliary ductal  dilatation.  2. 1.9 cm left renal exophytic lower pole lesion is denser than  typically seen for a simple cyst though this appearance may be due to  motion related artifact. Hyperdense cyst or solid lesion are also in the  differential diagnosis and this could be further evaluated with renal  sonogram or multiphasic CT.  3. Sigmoid diverticulosis without evidence for diverticulitis. D     Discussed with Dr. Palmer in the emergency department on 12/06/2021 at  12:05 AM.      Radiation dose reduction techniques were utilized, including automated  exposure control and exposure modulation based on body size.     This report was finalized on 12/6/2021 12:37 AM by Dr. Vipin Ambriz M.D.       XR Chest 1 View [[id number]] Collected: 12/05/21 2213     Updated: 12/05/21 2224    Narrative:      CHEST SINGLE VIEW     HISTORY: Altered mental status.     COMPARISON: Two-view chest 12/02/2021.     FINDINGS: Cardiomediastinal silhouette is within normal limits. Lungs  appear clear and there is no evidence for pulmonary edema or pleural  effusion or infiltrate.       Impression:      No evidence for active disease in the chest.     This report was finalized on 12/5/2021 10:21 PM by Dr. Vipin Ambriz M.D.             Medication Review:   Current Facility-Administered Medications   Medication Dose Route Frequency Provider Last Rate Last Admin   • acetaminophen (TYLENOL) tablet 650 mg  650 mg Oral Q4H PRN Didi Telles MD       • aluminum-magnesium hydroxide-simethicone (MAALOX MAX) 400-400-40 MG/5ML suspension 15 mL  15 mL Oral Q6H PRN Didi Telles MD       • amLODIPine (NORVASC) tablet 10 mg  10 mg Oral Daily Caitlyn Shipman APRN   10 mg at 12/07/21 0822   • ascorbic acid (VITAMIN C) tablet 500 mg  500 mg Oral Daily Caitlyn hSipman APRN   500 mg at 12/07/21 0822   • aspirin EC  tablet 81 mg  81 mg Oral Daily Caitlyn Shipman APRN   81 mg at 12/07/21 0822   • atorvastatin (LIPITOR) tablet 10 mg  10 mg Oral Nightly Caitlyn Shipman APRN   10 mg at 12/06/21 2043   • buPROPion XL (WELLBUTRIN XL) 24 hr tablet 300 mg  300 mg Oral Daily Caitlyn Shipman APRN   300 mg at 12/07/21 0822   • cetirizine (zyrTEC) tablet 10 mg  10 mg Oral Daily Caitlyn Shipman APRN   10 mg at 12/07/21 0822   • cholecalciferol (VITAMIN D3) tablet 1,000 Units  1,000 Units Oral Daily Caitlyn Shipman APRN   1,000 Units at 12/07/21 0822   • dextrose (D50W) (25 g/50 mL) IV injection 25 g  25 g Intravenous Q15 Min PRN Caitlyn Shipman APRN       • dextrose (GLUTOSE) oral gel 15 g  15 g Oral Q15 Min PRN Caitlyn Shipman APRN       • fluticasone (FLONASE) 50 MCG/ACT nasal spray 2 spray  2 spray Nasal Daily Caitlyn Shipman APRN   2 spray at 12/06/21 1748   • glucagon (human recombinant) (GLUCAGEN DIAGNOSTIC) injection 1 mg  1 mg Subcutaneous Q15 Min PRN Caitlyn Shipman APRN       • HYDROmorphone (DILAUDID) injection 0.5 mg  0.5 mg Intravenous Q2H PRN Didi Telles MD       • hydrOXYzine (ATARAX) tablet 25 mg  25 mg Oral TID PRN Caitlyn Shipman APRN       • insulin lispro (ADMELOG) injection 0-9 Units  0-9 Units Subcutaneous TID AC Caitlyn Shipman APRN   2 Units at 12/06/21 1750   • lisinopril (PRINIVIL,ZESTRIL) tablet 10 mg  10 mg Oral Daily Caitlyn Shipman APRN   10 mg at 12/07/21 0822   • nitroglycerin (NITROSTAT) SL tablet 0.4 mg  0.4 mg Sublingual Q5 Min PRN Didi Telles MD       • ondansetron (ZOFRAN) tablet 4 mg  4 mg Oral Q6H PRN Didi Telles MD        Or   • ondansetron (ZOFRAN) injection 4 mg  4 mg Intravenous Q6H PRN Didi Telles MD       • oxyCODONE-acetaminophen (PERCOCET) 5-325 MG per tablet 1 tablet  1 tablet Oral Q4H PRN Didi Telles MD       • pantoprazole (PROTONIX) EC tablet 40 mg  40 mg Oral QAM  Caitlyn Shipman APRN   40 mg at 12/07/21 0650   • Pharmacy to Dose Zosyn   Does not apply Continuous PRN Didi Telles MD       • piperacillin-tazobactam (ZOSYN) 3.375 g in iso-osmotic dextrose 50 ml (premix)  3.375 g Intravenous Q8H Didi Telles MD   3.375 g at 12/06/21 2342   • sodium chloride 0.9 % flush 10 mL  10 mL Intravenous PRN Didi Telles MD       • sodium chloride 0.9 % infusion  50 mL/hr Intravenous Continuous Chandu Villarreal MD 50 mL/hr at 12/07/21 0824 50 mL/hr at 12/07/21 0824   • tamsulosin (FLOMAX) 24 hr capsule 0.4 mg  0.4 mg Oral Daily Calos Thomas Jr., MD       • vitamin B-12 (CYANOCOBALAMIN) tablet 1,000 mcg  1,000 mcg Oral Daily Caitlyn Shipman APRN   1,000 mcg at 12/06/21 1749       Current Facility-Administered Medications:   •  acetaminophen (TYLENOL) tablet 650 mg, 650 mg, Oral, Q4H PRN, Didi Telles MD  •  aluminum-magnesium hydroxide-simethicone (MAALOX MAX) 400-400-40 MG/5ML suspension 15 mL, 15 mL, Oral, Q6H PRN, Didi Telles MD  •  amLODIPine (NORVASC) tablet 10 mg, 10 mg, Oral, Daily, Caitlyn Shipman APRN, 10 mg at 12/07/21 0822  •  ascorbic acid (VITAMIN C) tablet 500 mg, 500 mg, Oral, Daily, Caitlyn Shipman APRN, 500 mg at 12/07/21 0822  •  aspirin EC tablet 81 mg, 81 mg, Oral, Daily, Caitlyn Shipman APRN, 81 mg at 12/07/21 0822  •  atorvastatin (LIPITOR) tablet 10 mg, 10 mg, Oral, Nightly, Caitlyn Shipman APRN, 10 mg at 12/06/21 2043  •  buPROPion XL (WELLBUTRIN XL) 24 hr tablet 300 mg, 300 mg, Oral, Daily, Caitlyn Shipman APRN, 300 mg at 12/07/21 0822  •  cetirizine (zyrTEC) tablet 10 mg, 10 mg, Oral, Daily, Caitlyn Shipman APRN, 10 mg at 12/07/21 0822  •  cholecalciferol (VITAMIN D3) tablet 1,000 Units, 1,000 Units, Oral, Daily, Caitlyn Shipman APRN, 1,000 Units at 12/07/21 0822  •  dextrose (D50W) (25 g/50 mL) IV injection 25 g, 25 g, Intravenous, Q15 Min PRN, Umberto  JOHANA Weinstein  •  dextrose (GLUTOSE) oral gel 15 g, 15 g, Oral, Q15 Min PRN, Caitlyn Shipman APRN  •  fluticasone (FLONASE) 50 MCG/ACT nasal spray 2 spray, 2 spray, Nasal, Daily, Caitlyn Shipman APRN, 2 spray at 12/06/21 1748  •  glucagon (human recombinant) (GLUCAGEN DIAGNOSTIC) injection 1 mg, 1 mg, Subcutaneous, Q15 Min PRN, Caitlyn Shipman APRN  •  HYDROmorphone (DILAUDID) injection 0.5 mg, 0.5 mg, Intravenous, Q2H PRN, Didi Telles MD  •  hydrOXYzine (ATARAX) tablet 25 mg, 25 mg, Oral, TID PRN, Caitlyn Shipman APRN  •  insulin lispro (ADMELOG) injection 0-9 Units, 0-9 Units, Subcutaneous, TID AC, Caitlyn Shipman APRN, 2 Units at 12/06/21 1750  •  lisinopril (PRINIVIL,ZESTRIL) tablet 10 mg, 10 mg, Oral, Daily, Caitlyn Shipman APRN, 10 mg at 12/07/21 0822  •  nitroglycerin (NITROSTAT) SL tablet 0.4 mg, 0.4 mg, Sublingual, Q5 Min PRN, Didi Telles MD  •  ondansetron (ZOFRAN) tablet 4 mg, 4 mg, Oral, Q6H PRN **OR** ondansetron (ZOFRAN) injection 4 mg, 4 mg, Intravenous, Q6H PRN, Didi Telles MD  •  oxyCODONE-acetaminophen (PERCOCET) 5-325 MG per tablet 1 tablet, 1 tablet, Oral, Q4H PRN, Didi Telles MD  •  pantoprazole (PROTONIX) EC tablet 40 mg, 40 mg, Oral, QAM, Caitlyn Shipman APRN, 40 mg at 12/07/21 0650  •  Pharmacy to Dose Zosyn, , Does not apply, Continuous PRN, Didi Telles MD  •  piperacillin-tazobactam (ZOSYN) 3.375 g in iso-osmotic dextrose 50 ml (premix), 3.375 g, Intravenous, Q8H, Didi Telles MD, 3.375 g at 12/06/21 2342  •  sodium chloride 0.9 % flush 10 mL, 10 mL, Intravenous, PRN, Didi Telles MD  •  sodium chloride 0.9 % infusion, 50 mL/hr, Intravenous, Continuous, Chandu Villarreal MD, Last Rate: 50 mL/hr at 12/07/21 0824, 50 mL/hr at 12/07/21 0824  •  tamsulosin (FLOMAX) 24 hr capsule 0.4 mg, 0.4 mg, Oral, Daily, Calos Thomas Jr., MD  •  vitamin B-12 (CYANOCOBALAMIN) tablet 1,000 mcg,  1,000 mcg, Oral, Daily, Caitlyn Shipman, APRN, 1,000 mcg at 12/06/21 1749  Medications Discontinued During This Encounter   Medication Reason   • ceFAZolin in dextrose (ANCEF) IVPB solution 2 g Patient Transfer   • sodium chloride 0.9 % flush 3 mL Patient Transfer   • sodium chloride 0.9 % flush 3-10 mL Patient Transfer   • lidocaine PF 1% (XYLOCAINE) injection 0.5 mL Patient Transfer   • fentaNYL citrate (PF) (SUBLIMAZE) injection 50 mcg Patient Transfer   • sodium chloride (NS) irrigation solution Patient Discharge   • sodium chloride 0.9 % solution Patient Discharge   • iothalamate (CONRAY) injection Patient Discharge   • bupivacaine-EPINEPHrine PF (MARCAINE w/EPI) 0.5% -1:248015 injection Patient Discharge   • lactated ringers infusion    • ibuprofen (ADVIL,MOTRIN) tablet 600 mg Patient Transfer   • HYDROcodone-acetaminophen (NORCO) 7.5-325 MG per tablet 1 tablet Patient Transfer   • HYDROmorphone (DILAUDID) injection 0.5 mg Patient Transfer   • oxyCODONE-acetaminophen (PERCOCET) 7.5-325 MG per tablet 1 tablet Patient Transfer   • fentaNYL citrate (PF) (SUBLIMAZE) injection 50 mcg Patient Transfer   • naloxone (NARCAN) injection 0.2 mg Patient Transfer   • flumazenil (ROMAZICON) injection 0.2 mg Patient Transfer   • ondansetron (ZOFRAN) injection 4 mg Patient Transfer   • promethazine (PHENERGAN) suppository 25 mg Patient Transfer   • promethazine (PHENERGAN) tablet 25 mg Patient Transfer   • labetalol (NORMODYNE,TRANDATE) injection 5 mg Patient Transfer   • hydrALAZINE (APRESOLINE) injection 5 mg Patient Transfer   • ePHEDrine injection 5 mg Patient Transfer   • diphenhydrAMINE (BENADRYL) injection 12.5 mg Patient Transfer   • diphenhydrAMINE (BENADRYL) capsule 25 mg Patient Transfer   • Vitamin D (Cholecalciferol) capsule 25 mcg *Re-Entry       Assessment/Plan         Acute calculous cholecystitis    Anemia    Type 2 diabetes mellitus, without long-term current use of insulin (HCC)    Hypertension    CKD  (chronic kidney disease) stage 2, GFR 60-89 ml/min    Renal lesion        Plan:  Add flomax  IC (or ortez )  Re-evaluation of kidneys w renal mass protocol CT or MRI after recovery    Calos Thomas Jr., MD  12/07/21  08:59 EST

## 2021-12-07 NOTE — PROGRESS NOTES
General Surgery  Progress Note    CC: Follow-up acute gangrenous cholecystitis    POD#1 laparoscopic cholecystectomy    S: He reports feeling much better today and denies any nausea, vomiting, chest pain, fever, or chills.    O:/53 (BP Location: Left arm, Patient Position: Lying)   Pulse 74   Temp 98.4 °F (36.9 °C) (Oral)   Resp 22   SpO2 96%     Intake & Output:  UOP: 1300 mL/24 hrs  KHUSHI: 50 mL/24 hrs    GENERAL: alert, well appearing, and in no distress  HEENT: normocephalic, atraumatic, moist mucous membranes, clear sclerae   CHEST: clear to auscultation, no wheezes, rales or rhonchi, symmetric air entry  CARDIAC: regular rate and rhythm    ABDOMEN: Soft, obese, mild incisional tenderness, incisions clean/dry/intact with glue  EXTREMITIES: no cyanosis, clubbing, or edema   SKIN: Warm and moist, no rashes    LABS  Results from last 7 days   Lab Units 12/07/21  0916 12/05/21 1941 12/02/21  1405   WBC 10*3/mm3 12.06* 12.11* 11.21*   HEMOGLOBIN g/dL 11.4* 13.0 14.4   HEMATOCRIT % 34.1* 37.7 42.9   PLATELETS 10*3/mm3 339 302 282   MONOCYTES % %  --   --  2.2*     Results from last 7 days   Lab Units 12/07/21 0916 12/05/21 1941 12/02/21  1405   SODIUM mmol/L 133* 132* 136   POTASSIUM mmol/L 4.0 4.1 4.5   CHLORIDE mmol/L 100 96* 101   CO2 mmol/L 21.7* 21.7* 23.4   BUN mg/dL 24* 24* 22   CREATININE mg/dL 0.98 1.21 1.07   CALCIUM mg/dL 8.5* 8.9 8.9   BILIRUBIN mg/dL 0.6 1.3* 0.7   ALK PHOS U/L 45 54 60   ALT (SGPT) U/L 66* 29 12   AST (SGOT) U/L 69* 35 17   GLUCOSE mg/dL 146* 102* 179*             A/P: 76 y.o. male POD#1 laparoscopic cholecystectomy    He looks great, all things considered.  He is still struggling with urinary retention for which urology has been consulted and he is receiving in and out catheterizations.  I think he should likely stay 1 more night in the hospital tonight for IV antibiotics and will probably be ready for discharge home tomorrow.  I will remove his surgical drain before he is  discharged tomorrow.    Didi Telles MD  General, Robotic, and Endoscopic Surgery  [facility]    [address], [address]  [place] [address]  P: [phone]  F: [phone]

## 2021-12-08 ENCOUNTER — READMISSION MANAGEMENT (OUTPATIENT)
Dept: CALL CENTER | Facility: HOSPITAL | Age: 76
End: 2021-12-08

## 2021-12-08 VITALS
BODY MASS INDEX: 35.7 KG/M2 | HEART RATE: 80 BPM | OXYGEN SATURATION: 91 % | WEIGHT: 241 LBS | SYSTOLIC BLOOD PRESSURE: 125 MMHG | RESPIRATION RATE: 16 BRPM | DIASTOLIC BLOOD PRESSURE: 74 MMHG | TEMPERATURE: 98.7 F | HEIGHT: 69 IN

## 2021-12-08 LAB
ALBUMIN SERPL-MCNC: 3.1 G/DL (ref 3.5–5.2)
ALBUMIN/GLOB SERPL: 1.1 G/DL
ALP SERPL-CCNC: 47 U/L (ref 39–117)
ALT SERPL W P-5'-P-CCNC: 62 U/L (ref 1–41)
ANION GAP SERPL CALCULATED.3IONS-SCNC: 11.9 MMOL/L (ref 5–15)
AST SERPL-CCNC: 50 U/L (ref 1–40)
BASOPHILS # BLD AUTO: 0.05 10*3/MM3 (ref 0–0.2)
BASOPHILS NFR BLD AUTO: 0.6 % (ref 0–1.5)
BILIRUB SERPL-MCNC: 0.6 MG/DL (ref 0–1.2)
BUN SERPL-MCNC: 26 MG/DL (ref 8–23)
BUN/CREAT SERPL: 20 (ref 7–25)
CALCIUM SPEC-SCNC: 9.3 MG/DL (ref 8.6–10.5)
CHLORIDE SERPL-SCNC: 102 MMOL/L (ref 98–107)
CO2 SERPL-SCNC: 21.1 MMOL/L (ref 22–29)
CREAT SERPL-MCNC: 1.3 MG/DL (ref 0.76–1.27)
DEPRECATED RDW RBC AUTO: 41.2 FL (ref 37–54)
EOSINOPHIL # BLD AUTO: 0.26 10*3/MM3 (ref 0–0.4)
EOSINOPHIL NFR BLD AUTO: 3.1 % (ref 0.3–6.2)
ERYTHROCYTE [DISTWIDTH] IN BLOOD BY AUTOMATED COUNT: 12.8 % (ref 12.3–15.4)
GFR SERPL CREATININE-BSD FRML MDRD: 54 ML/MIN/1.73
GLOBULIN UR ELPH-MCNC: 2.9 GM/DL
GLUCOSE BLDC GLUCOMTR-MCNC: 121 MG/DL (ref 70–130)
GLUCOSE SERPL-MCNC: 127 MG/DL (ref 65–99)
HCT VFR BLD AUTO: 35.1 % (ref 37.5–51)
HGB BLD-MCNC: 11.9 G/DL (ref 13–17.7)
IMM GRANULOCYTES # BLD AUTO: 0.08 10*3/MM3 (ref 0–0.05)
IMM GRANULOCYTES NFR BLD AUTO: 1 % (ref 0–0.5)
LYMPHOCYTES # BLD AUTO: 1.2 10*3/MM3 (ref 0.7–3.1)
LYMPHOCYTES NFR BLD AUTO: 14.4 % (ref 19.6–45.3)
MCH RBC QN AUTO: 30.7 PG (ref 26.6–33)
MCHC RBC AUTO-ENTMCNC: 33.9 G/DL (ref 31.5–35.7)
MCV RBC AUTO: 90.5 FL (ref 79–97)
MONOCYTES # BLD AUTO: 0.6 10*3/MM3 (ref 0.1–0.9)
MONOCYTES NFR BLD AUTO: 7.2 % (ref 5–12)
NEUTROPHILS NFR BLD AUTO: 6.13 10*3/MM3 (ref 1.7–7)
NEUTROPHILS NFR BLD AUTO: 73.7 % (ref 42.7–76)
NRBC BLD AUTO-RTO: 0 /100 WBC (ref 0–0.2)
PLATELET # BLD AUTO: 432 10*3/MM3 (ref 140–450)
PMV BLD AUTO: 9.4 FL (ref 6–12)
POTASSIUM SERPL-SCNC: 3.8 MMOL/L (ref 3.5–5.2)
PROT SERPL-MCNC: 6 G/DL (ref 6–8.5)
RBC # BLD AUTO: 3.88 10*6/MM3 (ref 4.14–5.8)
SODIUM SERPL-SCNC: 135 MMOL/L (ref 136–145)
WBC NRBC COR # BLD: 8.32 10*3/MM3 (ref 3.4–10.8)

## 2021-12-08 PROCEDURE — 63710000001 LISINOPRIL 10 MG TABLET: Performed by: NURSE PRACTITIONER

## 2021-12-08 PROCEDURE — 63710000001 AMLODIPINE 10 MG TABLET: Performed by: NURSE PRACTITIONER

## 2021-12-08 PROCEDURE — A9270 NON-COVERED ITEM OR SERVICE: HCPCS | Performed by: NURSE PRACTITIONER

## 2021-12-08 PROCEDURE — 63710000001 VITAMIN B-12 500 MCG TABLET: Performed by: NURSE PRACTITIONER

## 2021-12-08 PROCEDURE — 99024 POSTOP FOLLOW-UP VISIT: CPT | Performed by: SURGERY

## 2021-12-08 PROCEDURE — 63710000001 ASPIRIN 81 MG TABLET DELAYED-RELEASE: Performed by: NURSE PRACTITIONER

## 2021-12-08 PROCEDURE — 63710000001 CHOLECALCIFEROL 25 MCG (1000 UT) TABLET: Performed by: NURSE PRACTITIONER

## 2021-12-08 PROCEDURE — A9270 NON-COVERED ITEM OR SERVICE: HCPCS | Performed by: UROLOGY

## 2021-12-08 PROCEDURE — 63710000001 TAMSULOSIN 0.4 MG CAPSULE: Performed by: UROLOGY

## 2021-12-08 PROCEDURE — 63710000001 ASCORBIC ACID 500 MG TABLET: Performed by: NURSE PRACTITIONER

## 2021-12-08 PROCEDURE — 63710000001 FLUTICASONE 50 MCG/ACT SUSPENSION 16 G BOTTLE: Performed by: NURSE PRACTITIONER

## 2021-12-08 PROCEDURE — 85025 COMPLETE CBC W/AUTO DIFF WBC: CPT | Performed by: SURGERY

## 2021-12-08 PROCEDURE — 80053 COMPREHEN METABOLIC PANEL: CPT | Performed by: SURGERY

## 2021-12-08 PROCEDURE — 63710000001 PANTOPRAZOLE 40 MG TABLET DELAYED-RELEASE: Performed by: NURSE PRACTITIONER

## 2021-12-08 PROCEDURE — 82962 GLUCOSE BLOOD TEST: CPT

## 2021-12-08 PROCEDURE — G0378 HOSPITAL OBSERVATION PER HR: HCPCS

## 2021-12-08 PROCEDURE — 25010000002 PIPERACILLIN SOD-TAZOBACTAM PER 1 G: Performed by: SURGERY

## 2021-12-08 PROCEDURE — 63710000001 BUPROPION XL 300 MG TABLET SUSTAINED-RELEASE 24 HOUR: Performed by: NURSE PRACTITIONER

## 2021-12-08 PROCEDURE — 63710000001 CETIRIZINE 10 MG TABLET: Performed by: NURSE PRACTITIONER

## 2021-12-08 RX ORDER — TAMSULOSIN HYDROCHLORIDE 0.4 MG/1
0.4 CAPSULE ORAL DAILY
Qty: 30 CAPSULE | Refills: 0 | Status: SHIPPED | OUTPATIENT
Start: 2021-12-09 | End: 2022-12-16

## 2021-12-08 RX ORDER — OXYCODONE HYDROCHLORIDE AND ACETAMINOPHEN 5; 325 MG/1; MG/1
1 TABLET ORAL EVERY 4 HOURS PRN
Qty: 24 TABLET | Refills: 0 | Status: SHIPPED | OUTPATIENT
Start: 2021-12-08 | End: 2021-12-23

## 2021-12-08 RX ADMIN — TAMSULOSIN HYDROCHLORIDE 0.4 MG: 0.4 CAPSULE ORAL at 08:06

## 2021-12-08 RX ADMIN — CETIRIZINE HYDROCHLORIDE 10 MG: 10 TABLET ORAL at 08:06

## 2021-12-08 RX ADMIN — BUPROPION HYDROCHLORIDE 300 MG: 300 TABLET, EXTENDED RELEASE ORAL at 08:06

## 2021-12-08 RX ADMIN — Medication 1000 UNITS: at 08:06

## 2021-12-08 RX ADMIN — LISINOPRIL 10 MG: 20 TABLET ORAL at 08:06

## 2021-12-08 RX ADMIN — ASPIRIN 81 MG: 81 TABLET, COATED ORAL at 08:06

## 2021-12-08 RX ADMIN — OXYCODONE HYDROCHLORIDE AND ACETAMINOPHEN 500 MG: 500 TABLET ORAL at 08:06

## 2021-12-08 RX ADMIN — AMLODIPINE BESYLATE 10 MG: 10 TABLET ORAL at 08:06

## 2021-12-08 RX ADMIN — Medication 1000 MCG: at 08:06

## 2021-12-08 RX ADMIN — PANTOPRAZOLE SODIUM 40 MG: 40 TABLET, DELAYED RELEASE ORAL at 06:38

## 2021-12-08 RX ADMIN — TAZOBACTAM SODIUM AND PIPERACILLIN SODIUM 3.38 G: 375; 3 INJECTION, SOLUTION INTRAVENOUS at 06:38

## 2021-12-08 RX ADMIN — FLUTICASONE PROPIONATE 2 SPRAY: 50 SPRAY, METERED NASAL at 08:07

## 2021-12-08 NOTE — PROGRESS NOTES
Case Management Discharge Note      Final Note: Discharged to home on 12/8/21. ALLYSON Gomez RN, CCP    Provided Post Acute Provider List?: Refused  Refused Provider List Comment: declined    Selected Continued Care - Admitted Since 12/5/2021     Destination    No services have been selected for the patient.              Durable Medical Equipment    No services have been selected for the patient.              Dialysis/Infusion    No services have been selected for the patient.              Home Medical Care    No services have been selected for the patient.              Therapy    No services have been selected for the patient.              Community Resources    No services have been selected for the patient.              Community & DME    No services have been selected for the patient.                  Transportation Services  Private: Car    Final Discharge Disposition Code: 01 - home or self-care

## 2021-12-08 NOTE — NURSING NOTE
3024 Nurse send page to Dr. Thomas, Urology, re: discharge clearance, and urinary retention pt needing ortez before d/c. Pt has not urinated since 1320 on 12/8.    0937 Per Dr. Thomas to place a ortez catheter before d/c, will see pt in 1 week.

## 2021-12-08 NOTE — PLAN OF CARE
Problem: Adult Inpatient Plan of Care  Goal: Plan of Care Review  Outcome: Ongoing, Progressing  Flowsheets (Taken 12/8/2021 0443)  Progress: no change  Plan of Care Reviewed With: patient  Outcome Summary: pt slept well tonight, Bladder scanned pt @ 1900 and was sitting at 125. due to be scanned again before shift change. no discomfort from patient. KHUSHI drain with 40 output. full liquid diet, stand by assist. and zosyn given as ordered    Bladder scanned at 0546 pt is sitting at 225 with no discomfort. Due to void soon if cant need to be straight cath.      Problem: Adult Inpatient Plan of Care  Goal: Patient-Specific Goal (Individualized)  Outcome: Ongoing, Progressing  Flowsheets (Taken 12/8/2021 0446)  Patient-Specific Goals (Include Timeframe): to feel better and go home  Individualized Care Needs: PRN and scheduled meds, stand by assist, full liquid diet, room air

## 2021-12-08 NOTE — DISCHARGE SUMMARY
Patient Name: Danyel Dash  : 1945  MRN: [id number]    Date of Admission: 2021  Date of Discharge:  2021  Primary Care Physician: Dereje Loya APRN      Chief Complaint:   Abdominal Pain and Weakness - Generalized      Discharge Diagnoses     Active Hospital Problems    Diagnosis  POA   • **Acute calculous cholecystitis [K80.00]  Yes   • CKD (chronic kidney disease) stage 2, GFR 60-89 ml/min [N18.2]  Yes   • Renal lesion [N28.9]  Yes   • Anemia [D64.9]  Yes   • Hypertension [I10]  Yes   • Type 2 diabetes mellitus, without long-term current use of insulin (HCC) [E11.9]  Yes      Resolved Hospital Problems   No resolved problems to display.        Hospital Course     Mr. Dash is a 76 y.o. male with a history of chronic kidney disease stage II, hypertension, and diabetes who presented to [facility] initially complaining of abdominal pain weakness and fatigue.  Please see the admitting history and physical for further details.  He was found to have cholecystitis and was admitted to the hospital for further evaluation and treatment.  He was admitted to the [facility] general surgery evaluate the patient and took emergently to the operating room for cholecystectomy.  He was also found to have significant urinary retention and urology was consulted.  He was started on Flomax and Estrada catheter was left in place.  He plans to follow-up with urology in a couple weeks for a voiding trial.  He tolerated surgery well post procedure he did receive antibiotics for 24 hours his drain was removed at the time of discharge and his pain is resolved.  He was given a few days worth of pain medication at discharge.  The plan is to follow-up with general surgery as directed and follow-up with urology in 2 weeks.      Day of Discharge     Subjective:  He is feeling fine today.  He is frustrated about not being able to urinate but other than that he feels well.  He wants to go home today    Physical  Exam:  Temp:  [98.1 °F (36.7 °C)-98.7 °F (37.1 °C)] 98.7 °F (37.1 °C)  Heart Rate:  [68-80] 80  Resp:  [16-22] 16  BP: (107-133)/(53-74) 125/74  Body mass index is 35.59 kg/m².  Physical Exam  Vitals and nursing note reviewed.   Constitutional:       Appearance: Normal appearance.   Cardiovascular:      Rate and Rhythm: Normal rate and regular rhythm.      Heart sounds: Normal heart sounds.   Pulmonary:      Effort: Pulmonary effort is normal.      Breath sounds: Normal breath sounds.   Abdominal:      General: Bowel sounds are normal.      Palpations: Abdomen is soft.   Neurological:      General: No focal deficit present.      Mental Status: He is alert and oriented to person, place, and time.   Psychiatric:         Mood and Affect: Mood normal.         Behavior: Behavior normal.         Consultants     Consult Orders (all) (From admission, onward)     Start     Ordered    12/06/21 1718  Inpatient Urology Consult  Once        Specialty:  Urology  Provider:  Adonay Douglas Jr., MD    12/06/21 1717    12/06/21 0547  Inpatient General Surgery Consult  Once        Specialty:  General Surgery  Provider:  Didi Telles MD    12/06/21 0546    12/06/21 0051  [facility] (on-call MD unless specified) Details  Once        Specialty:  Hospitalist  Provider:  (Not yet assigned)    12/06/21 0050    12/06/21 0009  Surgery (on-call MD unless specified)  Once        Specialty:  General Surgery  Provider:  (Not yet assigned)    12/06/21 0008              Procedures     CHOLECYSTECTOMY LAPAROSCOPIC      Imaging Results (All)     Procedure Component Value Units Date/Time    US Renal Bilateral [[id number]] Collected: 12/07/21 0643     Updated: 12/07/21 0755    Narrative:      US RENAL BILATERAL-     CLINICAL: Indeterminate left renal nodule.     COMPARISON: Ultrasound 12/06/2021, CT abdomen 12/05/2021.     FINDINGS: The right kidney is 11.5 cm in length, the left kidney is 10.8  cm in length. The right kidney is normal in appearance.  Arising from the  lower pole of the left kidney there is a hypoechoic nodule with through  enhancement and low level internal echoes. It measures 19 x 18 x 18 mm.  Likely complex proteinaceous cyst. Advise short-term follow-up  ultrasound in 6 months for further evaluation. Left kidney is otherwise  within normal limits. Limited images of the bladder failed to  demonstrate either the right or left ureteral jet. No renal calculus nor  obstructive uropathy seen.     CONCLUSION: Nodule arising from the lower pole of the left kidney likely  represents a complex proteinaceous cyst, 6 month follow-up ultrasound  recommended.     This report was finalized on 12/7/2021 7:51 AM by Dr. Kvng Ferrara M.D.       FL Cholangiogram Operative [[id number]] Collected: 12/06/21 1100     Updated: 12/06/21 1123    Narrative:      FLUOROSCOPIC OPERATIVE CHOLANGIOGRAM     CLINICAL INFORMATION: Laparoscopic cholecystectomy.     FLUOROSCOPY TIME: 11 seconds, 59 images.     FINDINGS: Filling of the cystic duct attempted, however persistent  extravasation at the injection site.     This report was finalized on 12/6/2021 11:20 AM by Dr. Kvng Ferrara M.D.       US Gallbladder [[id number]] Collected: 12/06/21 0114     Updated: 12/06/21 0118    Narrative:      RIGHT UPPER QUADRANT ULTRASOUND     HISTORY:  Pain. Evaluate for cholecystitis.     COMPARISON: CT abdomen pelvis 12/5/2021     FINDINGS:  The liver exhibits normal echotexture and measures 14.37 m in length..   There is no intra or extrahepatic biliary duct dilatation. The common  duct measures 4mm. The gallbladder wall is thickened measuring 5 mm.  There is stones and sludge within the gallbladder and there is  gallbladder distention. These findings are consistent with  cholecystitis..       Visualized segments the pancreas appear within normal limits.  The right  kidney measures 10.4cm in length and there is no hydronephrosis. There  is no ascites.       Impression:      Findings  consistent with cholecystitis with gallbladder  wall thickening and gallstones and sludge as well as distention.     This report was finalized on 12/6/2021 1:15 AM by Dr. Vipin Ambriz M.D.       CT Abdomen Pelvis With Contrast [[id number]] Collected: 12/06/21 0011     Updated: 12/06/21 0040    Narrative:      CT ABDOMEN AND PELVIS WITH IV CONTRAST     HISTORY: Abdominal pain     TECHNIQUE:  CT includes axial imaging from the lung bases to the  trochanters with intravenous contrast and without use of oral contrast.  Data reconstructed in coronal and sagittal planes. Radiation dose  reduction techniques were utilized, including automated exposure control  and exposure modulation based on body size.     COMPARISON: CT abdomen and pelvis 12/30/2010     FINDINGS: There is abnormal gallbladder wall thickening as well as  pericholecystic stranding/inflammation. Within the gallbladder near the  neck there is a 6 mm polyp or faintly mineralized stone. No biliary  ductal dilatation is evident. Liver, spleen, adrenal glands, pancreas,  right kidney appear normal. There is an exophytic left lower pole  lateral renal low-density lesion that measures 1.9 cm. This contains  internal density that may represent motion artifact though a hyperdense  cyst or solid lesion are also in the differential diagnosis. Sigmoid  diverticulosis is present without evidence for diverticulitis.  Atherosclerotic calcifications are present involving the abdominal aorta  and iliac vasculature. There is no ascites. There is multilevel endplate  spur formation within the thoracic spine.       Impression:      1. Findings are consistent with acute cholecystitis. 6 mm polyp or  poorly mineralized stone near the gallbladder neck. No biliary ductal  dilatation.  2. 1.9 cm left renal exophytic lower pole lesion is denser than  typically seen for a simple cyst though this appearance may be due to  motion related artifact. Hyperdense cyst or solid lesion  are also in the  differential diagnosis and this could be further evaluated with renal  sonogram or multiphasic CT.  3. Sigmoid diverticulosis without evidence for diverticulitis. D     Discussed with Dr. Palmer in the emergency department on 12/06/2021 at  12:05 AM.      Radiation dose reduction techniques were utilized, including automated  exposure control and exposure modulation based on body size.     This report was finalized on 12/6/2021 12:37 AM by Dr. Vipin Ambriz M.D.       XR Chest 1 View [[id number]] Collected: 12/05/21 2213     Updated: 12/05/21 2224    Narrative:      CHEST SINGLE VIEW     HISTORY: Altered mental status.     COMPARISON: Two-view chest 12/02/2021.     FINDINGS: Cardiomediastinal silhouette is within normal limits. Lungs  appear clear and there is no evidence for pulmonary edema or pleural  effusion or infiltrate.       Impression:      No evidence for active disease in the chest.     This report was finalized on 12/5/2021 10:21 PM by Dr. Vipin Ambriz M.D.               Pertinent Labs     Results from last 7 days   Lab Units 12/08/21  0854 12/07/21  0916 12/05/21 1941 12/02/21  1405   WBC 10*3/mm3 8.32 12.06* 12.11* 11.21*   HEMOGLOBIN g/dL 11.9* 11.4* 13.0 14.4   PLATELETS 10*3/mm3 432 339 302 282     Results from last 7 days   Lab Units 12/08/21  0854 12/07/21  0916 12/05/21 1941 12/02/21  1405   SODIUM mmol/L 135* 133* 132* 136   POTASSIUM mmol/L 3.8 4.0 4.1 4.5   CHLORIDE mmol/L 102 100 96* 101   CO2 mmol/L 21.1* 21.7* 21.7* 23.4   BUN mg/dL 26* 24* 24* 22   CREATININE mg/dL 1.30* 0.98 1.21 1.07   GLUCOSE mg/dL 127* 146* 102* 179*   EGFR IF NONAFRICN AM mL/min/1.73 54* 75 58* 67     Results from last 7 days   Lab Units 12/08/21  0854 12/07/21  0916 12/05/21 1941 12/02/21  1405   ALBUMIN g/dL 3.10* 3.10* 3.60 4.40   BILIRUBIN mg/dL 0.6 0.6 1.3* 0.7   ALK PHOS U/L 47 45 54 60   AST (SGOT) U/L 50* 69* 35 17   ALT (SGPT) U/L 62* 66* 29 12     Results from last 7 days   Lab  Units 12/08/21  0854 12/07/21  0916 12/05/21 1941 12/02/21  1405   CALCIUM mg/dL 9.3 8.5* 8.9 8.9   ALBUMIN g/dL 3.10* 3.10* 3.60 4.40   MAGNESIUM mg/dL  --  2.1  --   --      Results from last 7 days   Lab Units 12/05/21  1941   LIPASE U/L 33     Results from last 7 days   Lab Units 12/05/21 1941 12/02/21  1405   TROPONIN T ng/mL <0.010 <0.010     Results from last 7 days   Lab Units 12/07/21  0916   URIC ACID mg/dL 6.5         Invalid input(s): LDLCALC  Results from last 7 days   Lab Units 12/06/21  0126 12/06/21  0121   BLOODCX  No growth at 2 days No growth at 2 days     Results from last 7 days   Lab Units 12/06/21  0130   COVID19  Not Detected       Test Results Pending at Discharge     Pending Labs     Order Current Status    Blood Culture - Blood, Hand, Left Preliminary result    Blood Culture - Blood, Hand, Right Preliminary result          Discharge Details        Discharge Medications      New Medications      Instructions Start Date   oxyCODONE-acetaminophen 5-325 MG per tablet  Commonly known as: PERCOCET   1 tablet, Oral, Every 4 Hours PRN      tamsulosin 0.4 MG capsule 24 hr capsule  Commonly known as: FLOMAX   0.4 mg, Oral, Daily   Start Date: December 9, 2021        Continue These Medications      Instructions Start Date   acetaminophen 325 MG tablet  Commonly known as: TYLENOL   650 mg, Oral, Every 6 Hours PRN      amLODIPine 10 MG tablet  Commonly known as: NORVASC   10 mg, Oral, Daily      aspirin 81 MG EC tablet   81 mg, Oral, Daily      Blood Gluc Meter Disp-Strips device   Pt to monitor blood glucose two times daily      buPROPion  MG 24 hr tablet  Commonly known as: WELLBUTRIN XL   300 mg, Oral, Daily      Diclofenac Sodium 1 % gel gel  Commonly known as: VOLTAREN   No dose, route, or frequency recorded.      ferrous gluconate 240 (27 FE) MG tablet  Commonly known as: FERGON   480 mg      fluticasone 50 MCG/ACT nasal spray  Commonly known as: Flonase   2 sprays, Nasal, Daily       hydrOXYzine 25 MG tablet  Commonly known as: ATARAX   TAKE ONE TABLET BY MOUTH THREE TIMES A DAY AS NEEDED FOR ANXIETY.      lisinopril 10 MG tablet  Commonly known as: PRINIVIL,ZESTRIL   10 mg, Oral, Daily      loratadine 10 MG tablet  Commonly known as: CLARITIN   10 mg      pantoprazole 40 MG EC tablet  Commonly known as: Protonix   40 mg, Oral, Daily      pioglitazone 45 MG tablet  Commonly known as: ACTOS   45 mg, Oral, Daily      simvastatin 20 MG tablet  Commonly known as: ZOCOR   20 mg, Oral, Daily      vitamin B-12 1000 MCG tablet  Commonly known as: CYANOCOBALAMIN   1,000 mcg, Oral, Daily      vitamin C 500 MG tablet  Commonly known as: ASCORBIC ACID   500 mg, Oral, Daily      Vitamin D-3 25 MCG (1000 UT) capsule   1,000 Units, Oral, Daily         Stop These Medications    sucralfate 1 g tablet  Commonly known as: Carafate     traMADol 50 MG tablet  Commonly known as: ULTRAM            No Known Allergies    Discharge Disposition:  Home or Self Care      Discharge Diet:  Diet Order   Procedures   • Diet Regular       Discharge Activity:   Activity Instructions     Activity as Tolerated            CODE STATUS:    Code Status and Medical Interventions:   Ordered at: 12/06/21 0546     Code Status (Patient has no pulse and is not breathing):    CPR (Attempt to Resuscitate)     Medical Interventions (Patient has pulse or is breathing):    Full Support       Future Appointments   Date Time Provider Department Center   4/8/2022 10:00 AM Dereje Loya APRN [facility]  [facility]     Additional Instructions for the Follow-ups that You Need to Schedule     Discharge Follow-up with PCP   As directed       Currently Documented PCP:    Dereje Loya APRN    PCP Phone Number:    [phone]     Follow Up Details: 3-4 weeks for diabetes and urine retention         Discharge Follow-up with Specified Provider: Dr Telles; 2 Weeks   As directed      To: Dr Telles    Follow Up: 2 Weeks         Discharge Follow-up with  Specified Provider: urology as recomended   As directed      To: urology as recomended            Follow-up Information     Ady Yip MD. Schedule an appointment as soon as possible for a visit.    Specialty: Urology  Why: renal mass evaluation    Contact information:  [address] [facility] [address]  [facility] [id number] [phone]             Dereje Loya APRN .    Specialties: Family Medicine, Urgent Care  Why: 3-4 weeks for diabetes and urine retention  Contact information:  [address] [id number] [phone]             Didi Telles MD Follow up in 2 week(s).    Specialty: General Surgery  Why: Call for appointment.  Contact information:  [address]  [facility] [address]  [facility] [id number] [phone]                         Additional Instructions for the Follow-ups that You Need to Schedule     Discharge Follow-up with PCP   As directed       Currently Documented PCP:    Dereje Loya APRN    PCP Phone Number:    [phone]     Follow Up Details: 3-4 weeks for diabetes and urine retention         Discharge Follow-up with Specified Provider: Dr Telles; 2 Weeks   As directed      To: Dr Telles    Follow Up: 2 Weeks         Discharge Follow-up with Specified Provider: urology as recomended   As directed      To: urology as recomended           Time Spent on Discharge:  Greater than 30 minutes      Chandu Villarreal MD  [facility]  12/08/21  12:51 EST

## 2021-12-08 NOTE — PROGRESS NOTES
"General Surgery  Progress Note    CC: Follow-up acute gangrenous cholecystitis    POD#2 laparoscopic cholecystectomy    S: He continues to struggle with urinary retention but otherwise feels great.    O:/74 (BP Location: Right arm, Patient Position: Sitting)   Pulse 80   Temp 98.7 °F (37.1 °C) (Oral)   Resp 16   Ht 175.3 cm (69\")   Wt 109 kg (241 lb)   SpO2 91%   BMI 35.59 kg/m²     Intake & Output:  UOP: 1760 mL/24 hrs  KHUSHI: 70 mL/24 hrs    GENERAL: alert, well appearing, and in no distress  HEENT: normocephalic, atraumatic, moist mucous membranes, clear sclerae   CHEST: clear to auscultation, no wheezes, rales or rhonchi, symmetric air entry  CARDIAC: regular rate and rhythm    ABDOMEN: Soft, obese, mild incisional tenderness, incisions clean/dry/intact with glue, KHUSHI output serosanguinous  EXTREMITIES: no cyanosis, clubbing, or edema   SKIN: Warm and moist, no rashes    LABS  Results from last 7 days   Lab Units 12/08/21  0854 12/07/21  0916 12/05/21  1941 12/02/21  1405 12/02/21  1405   WBC 10*3/mm3 8.32 12.06* 12.11*   < > 11.21*   HEMOGLOBIN g/dL 11.9* 11.4* 13.0   < > 14.4   HEMATOCRIT % 35.1* 34.1* 37.7   < > 42.9   PLATELETS 10*3/mm3 432 339 302   < > 282   MONOCYTES % %  --   --   --   --  2.2*    < > = values in this interval not displayed.     Results from last 7 days   Lab Units 12/08/21  0854 12/07/21  0916 12/05/21 1941   SODIUM mmol/L 135* 133* 132*   POTASSIUM mmol/L 3.8 4.0 4.1   CHLORIDE mmol/L 102 100 96*   CO2 mmol/L 21.1* 21.7* 21.7*   BUN mg/dL 26* 24* 24*   CREATININE mg/dL 1.30* 0.98 1.21   CALCIUM mg/dL 9.3 8.5* 8.9   BILIRUBIN mg/dL 0.6 0.6 1.3*   ALK PHOS U/L 47 45 54   ALT (SGPT) U/L 62* 66* 29   AST (SGOT) U/L 50* 69* 35   GLUCOSE mg/dL 127* 146* 102*             A/P: 76 y.o. male POD#2 laparoscopic cholecystectomy    I removed his drain. He can be discharged home and follow-up with me in 2 weeks. He knows to call to schedule that appointment. He does not need any " antibiotics.    Didi Telles MD  General, Robotic, and Endoscopic Surgery  [facility], [facility]    [address], [address]  [place] [address]  P: [phone]  F: [phone]      [employer]

## 2021-12-09 ENCOUNTER — TRANSITIONAL CARE MANAGEMENT TELEPHONE ENCOUNTER (OUTPATIENT)
Dept: CALL CENTER | Facility: HOSPITAL | Age: 76
End: 2021-12-09

## 2021-12-09 NOTE — OUTREACH NOTE
Call Center TCM Note      Responses   [facility] patient discharged from? [place]   Does the patient have one of the following disease processes/diagnoses(primary or secondary)? General Surgery   TCM attempt successful? Yes   Call start time 1455   Call end time 1459   Discharge diagnosis s/p cholecystectomy, urinary retention   Meds reviewed with patient/caregiver? Yes   Is the patient having any side effects they believe may be caused by any medication additions or changes? No   Does the patient have all medications related to this admission filled (includes all antibiotics, pain medications, etc.) Yes   Is the patient taking all medications as directed (includes completed medication regime)? Yes   Does the patient have a follow up appointment scheduled with their surgeon? Yes  [two weeks will have fu with surgeon ]   Comments Patient would like to talk to wife first before scheduling dc fu apt   Psychosocial issues? No   Did the patient receive a copy of their discharge instructions? Yes   Nursing interventions Reviewed instructions with patient   What is the patient's perception of their health status since discharge? Improving   Nursing interventions Nurse provided patient education   Is the patient /caregiver able to teach back basic post-op care? Practice 'cough and deep breath'   Is the patient/caregiver able to teach back signs and symptoms of incisional infection? Increased drainage or bleeding,  Increased redness, swelling or pain at the incisonal site,  Fever   Is the patient/caregiver able to teach back steps to recovery at home? Set small, achievable goals for return to baseline health,  Rest and rebuild strength, gradually increase activity   If the patient is a current smoker, are they able to teach back resources for cessation? Not a smoker   Is the patient/caregiver able to teach back the hierarchy of who to call/visit for symptoms/problems? PCP, Specialist, Home health nurse, Urgent Care,  ED, 911 Yes   TCM call completed? Yes          Alma Delia Aviles RN    12/9/2021, 14:59 EST

## 2021-12-09 NOTE — OUTREACH NOTE
Prep Survey      Responses   [facility] [place] patient discharged from? [place]   Is LACE score < 7 ? No   Emergency Room discharge w/ pulse ox? No   Eligibility [facility]   Date of Admission 12/05/21   Date of Discharge 12/08/21   Discharge Disposition Home or Self Care   Discharge diagnosis s/p cholecystectomy, urinary retention   Does the patient have one of the following disease processes/diagnoses(primary or secondary)? General Surgery   Does the patient have Home health ordered? No   Is there a DME ordered? No   Prep survey completed? Yes          Rebekah Holliday RN

## 2021-12-10 ENCOUNTER — TELEPHONE (OUTPATIENT)
Dept: FAMILY MEDICINE CLINIC | Facility: CLINIC | Age: 76
End: 2021-12-10

## 2021-12-10 NOTE — TELEPHONE ENCOUNTER
[facility] staff attempted to follow warm transfer process and was unsuccessful     Caller: Lea Dash    Relationship to patient: Emergency Contact    Best call back number: [phone]     Patient is needing: HOSPITAL DISCHARGE ON Wednesday 8.  SEEKING A HOSPITAL FOLLOW UP APPOINTMENT.  [facility] OFFERED HER APPOINTMENT AND SHE CANNOT SCHEDULE THAT APPOINTMENT.  SHE IS REQUESTING AN APPOINTMENT AFTER CA IN THE AFTERNOON.  PLEASE ADVISE

## 2021-12-11 LAB
BACTERIA SPEC AEROBE CULT: NORMAL
BACTERIA SPEC AEROBE CULT: NORMAL

## 2021-12-16 ENCOUNTER — OFFICE VISIT (OUTPATIENT)
Dept: FAMILY MEDICINE CLINIC | Facility: CLINIC | Age: 76
End: 2021-12-16

## 2021-12-16 VITALS
WEIGHT: 241 LBS | HEART RATE: 81 BPM | HEIGHT: 69 IN | TEMPERATURE: 97.6 F | BODY MASS INDEX: 35.7 KG/M2 | OXYGEN SATURATION: 98 % | DIASTOLIC BLOOD PRESSURE: 64 MMHG | SYSTOLIC BLOOD PRESSURE: 142 MMHG

## 2021-12-16 DIAGNOSIS — K80.00 ACUTE CALCULOUS CHOLECYSTITIS: ICD-10-CM

## 2021-12-16 DIAGNOSIS — N28.9 RENAL LESION: ICD-10-CM

## 2021-12-16 DIAGNOSIS — N18.2 CKD (CHRONIC KIDNEY DISEASE) STAGE 2, GFR 60-89 ML/MIN: ICD-10-CM

## 2021-12-16 DIAGNOSIS — Z09 HOSPITAL DISCHARGE FOLLOW-UP: Primary | ICD-10-CM

## 2021-12-16 PROCEDURE — 99213 OFFICE O/P EST LOW 20 MIN: CPT | Performed by: NURSE PRACTITIONER

## 2021-12-16 PROCEDURE — 1111F DSCHRG MED/CURRENT MED MERGE: CPT | Performed by: NURSE PRACTITIONER

## 2021-12-16 NOTE — PROGRESS NOTES
Transitional Care Follow Up Visit  Subjective     Danyel Dash is a 76 y.o. male who presents for a transitional care management visit.    Within 48 business hours after discharge our office contacted him via telephone to coordinate his care and needs.      I reviewed and discussed the details of that call along with the discharge summary, hospital problems, inpatient lab results, inpatient diagnostic studies, and consultation reports with Danyel.     Current outpatient and discharge medications have been reconciled for the patient.  Reviewed by: JOHANA Reza      Date of TCM Phone Call 12/8/2021   [facility]   Date of Admission 12/5/2021   Date of Discharge 12/8/2021   Discharge Disposition Home or Self Care     Risk for Readmission (LACE) Score: 9 (12/8/2021  6:01 AM)      History of Present Illness   Course During Hospital Stay: Patient went to the emergency room on 12/5/2021 with complaints of 5 days of abdominal distention epigastric lower sternal discomfort off and on for 5 days poor appetite no urination within the past 24 hours.  CT showed cholecystitis.  Also showed 1.9 cm left lower pole lesion that was denser than what was typically seen for simple cyst.  Further investigation with a renal sonogram or multiphasic CT was recommended.  Sigmoid diverticulosis. Renal us showed complete cyst on left kidney.   Urology was consulted for significant urinary retention he was started on Flomax and his Estrada was left in place.  He is was a follow-up with urology in a couple weeks.  He tolerated surgery well he received antibiotics for 24 hours after surgery drain was removed at the time of discharge pain had resolved.  Follow-up with general surgery in 2 weeks and urology in 2 weeks after discharge from the hospital.  He has follow up appointment with urology on the 2nd. Catheter is still in place.  Still taking Flomax.     Follows up with surgeon on the 23rd.      Other than  frustration with the catheter he is feeling better daily.  Hasn't had any pain medication.  Pain is controlled.  Feels fatigued and weak bu that is improving daily.     The following portions of the patient's history were reviewed and updated as appropriate: allergies, current medications, past family history, past medical history, past social history, past surgical history and problem list.    Review of Systems   Constitutional: Negative for fever.   Respiratory: Negative for cough and shortness of breath.    Cardiovascular: Negative for chest pain.   Gastrointestinal: Negative for abdominal pain.   Neurological: Negative for dizziness.       Objective   Physical Exam  Vitals reviewed.   Constitutional:       General: He is not in acute distress.     Appearance: He is well-developed.   HENT:      Head: Normocephalic.   Cardiovascular:      Rate and Rhythm: Normal rate and regular rhythm.      Heart sounds: Normal heart sounds.   Pulmonary:      Effort: Pulmonary effort is normal.      Breath sounds: Normal breath sounds.   Neurological:      Mental Status: He is alert and oriented to person, place, and time.      Gait: Gait normal.   Psychiatric:         Behavior: Behavior normal.         Thought Content: Thought content normal.         Judgment: Judgment normal.         Assessment/Plan   Diagnoses and all orders for this visit:    1. Hospital discharge follow-up (Primary)    2. Renal lesion    3. CKD (chronic kidney disease) stage 2, GFR 60-89 ml/min    4. Acute calculous cholecystitis      Cont with follow up for urology and surgeon     Mask and googles worn

## 2021-12-23 ENCOUNTER — OFFICE VISIT (OUTPATIENT)
Dept: SURGERY | Facility: CLINIC | Age: 76
End: 2021-12-23

## 2021-12-23 VITALS — BODY MASS INDEX: 35.7 KG/M2 | WEIGHT: 241 LBS | HEIGHT: 69 IN

## 2021-12-23 DIAGNOSIS — Z09 SURGICAL FOLLOWUP: Primary | ICD-10-CM

## 2021-12-23 DIAGNOSIS — K80.00 CALCULUS OF GALLBLADDER WITH ACUTE CHOLECYSTITIS WITHOUT OBSTRUCTION: ICD-10-CM

## 2021-12-23 PROCEDURE — 99024 POSTOP FOLLOW-UP VISIT: CPT | Performed by: SURGERY

## 2022-01-10 NOTE — PROGRESS NOTES
CHIEF COMPLAINT:   Chief Complaint   Patient presents with   • Post-op     Laparoscopic cholecystectomy       HISTORY OF PRESENT ILLNESS:  This is a 76 y.o. male who presents for a post-operative visit after undergoing laparoscopic cholecystectomy on 12/6/2021 for acute necrotizing cholecystitis.  He had a rough postoperative course mostly complicated by urinary retention requiring a Estrada catheter and outpatient follow-up with a urologist yesterday where the Estrada was removed.  He was able to void just fine on his own this morning after the catheter was removed yesterday.  He denies any fever, chills, jaundice, or scleral icterus.  He has a low appetite but says that it is improving day by day.  He is also slightly weaker than he would have expected, but reports that it is also improving day by day.    Pathology:   Gallbladder, Cholecystectomy:  Benign gallbladder with               A.  Cholelithiasis.                B.  Necrotizing acute cholecystitis.    PHYSICAL EXAM:  Lungs: Clear  Heart: RRR  ABD: Incisions are healing well without any erythema or signs of infection.  Ext: no significant edema, calves nontender    A/P:  This is a 76 y.o. male patient who is S/P laparoscopic cholecystectomy on 12/6/2021    He is doing beautifully.  I discussed the benign pathology findings which confirmed my intraoperative suspicion of acute necrotizing cholecystitis.  I reassured him that his lack of appetite and lack of energy are both to be expected at this point in his healing process, but will continue to improve back to his baseline over the next 4 weeks.  He can see me back on an as-needed basis and has no restrictions moving forward.    Didi Telles MD  General, Robotic, and Endoscopic Surgery  [facility]    [address], [address]  [place] [address]  P: [phone]  F: [phone]

## 2022-02-19 DIAGNOSIS — F41.9 ANXIETY: ICD-10-CM

## 2022-02-21 NOTE — TELEPHONE ENCOUNTER
Rx Refill Note  Requested Prescriptions     Pending Prescriptions Disp Refills   • hydrOXYzine (ATARAX) 25 MG tablet [Pharmacy Med Name: hydrOXYzine HCL 25 MG TABLET] 90 tablet 0     Sig: TAKE ONE TABLET BY MOUTH THREE TIMES A DAY AS NEEDED FOR ANXIETY      Last office visit with prescribing clinician: 12/16/2021      Next office visit with prescribing clinician: 4/8/2022            Deonte Guo MA  02/21/22, 08:19 EST

## 2022-02-22 RX ORDER — HYDROXYZINE HYDROCHLORIDE 25 MG/1
TABLET, FILM COATED ORAL
Qty: 90 TABLET | Refills: 0 | Status: SHIPPED | OUTPATIENT
Start: 2022-02-22 | End: 2022-04-04

## 2022-03-12 DIAGNOSIS — K21.9 GASTROESOPHAGEAL REFLUX DISEASE, UNSPECIFIED WHETHER ESOPHAGITIS PRESENT: ICD-10-CM

## 2022-03-14 RX ORDER — PANTOPRAZOLE SODIUM 40 MG/1
TABLET, DELAYED RELEASE ORAL
Qty: 90 TABLET | Refills: 0 | Status: SHIPPED | OUTPATIENT
Start: 2022-03-14 | End: 2022-05-27 | Stop reason: SDUPTHER

## 2022-04-01 ENCOUNTER — TRANSCRIBE ORDERS (OUTPATIENT)
Dept: ADMINISTRATIVE | Facility: HOSPITAL | Age: 77
End: 2022-04-01

## 2022-04-01 DIAGNOSIS — N28.1 SIMPLE RENAL CYST: Primary | ICD-10-CM

## 2022-04-03 DIAGNOSIS — F41.9 ANXIETY: ICD-10-CM

## 2022-04-04 RX ORDER — HYDROXYZINE HYDROCHLORIDE 25 MG/1
TABLET, FILM COATED ORAL
Qty: 90 TABLET | Refills: 0 | Status: SHIPPED | OUTPATIENT
Start: 2022-04-04 | End: 2022-06-20 | Stop reason: SDUPTHER

## 2022-04-04 NOTE — TELEPHONE ENCOUNTER
Rx Refill Note  Requested Prescriptions     Pending Prescriptions Disp Refills   • hydrOXYzine (ATARAX) 25 MG tablet [Pharmacy Med Name: hydrOXYzine HCL 25 MG TABLET] 90 tablet 0     Sig: TAKE ONE TABLET BY MOUTH THREE TIMES A DAY AS NEEDED FOR ANXIETY      Last office visit with prescribing clinician: 12/16/2021      Next office visit with prescribing clinician: 4/11/2022            Deonte Guo MA  04/04/22, 17:14 EDT

## 2022-04-06 ENCOUNTER — PRIOR AUTHORIZATION (OUTPATIENT)
Dept: FAMILY MEDICINE CLINIC | Facility: CLINIC | Age: 77
End: 2022-04-06

## 2022-04-08 DIAGNOSIS — I10 ESSENTIAL HYPERTENSION: ICD-10-CM

## 2022-04-11 ENCOUNTER — OFFICE VISIT (OUTPATIENT)
Dept: FAMILY MEDICINE CLINIC | Facility: CLINIC | Age: 77
End: 2022-04-11

## 2022-04-11 VITALS
OXYGEN SATURATION: 99 % | SYSTOLIC BLOOD PRESSURE: 126 MMHG | HEART RATE: 72 BPM | HEIGHT: 69 IN | WEIGHT: 235 LBS | TEMPERATURE: 96.9 F | BODY MASS INDEX: 34.8 KG/M2 | DIASTOLIC BLOOD PRESSURE: 58 MMHG

## 2022-04-11 DIAGNOSIS — K21.9 GASTROESOPHAGEAL REFLUX DISEASE, UNSPECIFIED WHETHER ESOPHAGITIS PRESENT: ICD-10-CM

## 2022-04-11 DIAGNOSIS — I10 ESSENTIAL HYPERTENSION: ICD-10-CM

## 2022-04-11 DIAGNOSIS — H81.03 MENIERE'S DISEASE OF BOTH EARS: ICD-10-CM

## 2022-04-11 DIAGNOSIS — Z12.5 SPECIAL SCREENING FOR MALIGNANT NEOPLASM OF PROSTATE: ICD-10-CM

## 2022-04-11 DIAGNOSIS — E78.01 FAMILIAL HYPERCHOLESTEROLEMIA: ICD-10-CM

## 2022-04-11 DIAGNOSIS — D50.8 OTHER IRON DEFICIENCY ANEMIA: ICD-10-CM

## 2022-04-11 DIAGNOSIS — I10 PRIMARY HYPERTENSION: ICD-10-CM

## 2022-04-11 DIAGNOSIS — F41.9 ANXIETY: ICD-10-CM

## 2022-04-11 DIAGNOSIS — E11.9 TYPE 2 DIABETES MELLITUS WITHOUT COMPLICATION, WITHOUT LONG-TERM CURRENT USE OF INSULIN: Primary | ICD-10-CM

## 2022-04-11 DIAGNOSIS — N18.31 STAGE 3A CHRONIC KIDNEY DISEASE: ICD-10-CM

## 2022-04-11 PROBLEM — Z09 HOSPITAL DISCHARGE FOLLOW-UP: Status: RESOLVED | Noted: 2021-12-16 | Resolved: 2022-04-11

## 2022-04-11 PROBLEM — K57.32 DIVERTICULITIS OF COLON: Status: RESOLVED | Noted: 2021-12-03 | Resolved: 2022-04-11

## 2022-04-11 PROBLEM — K80.00 ACUTE CALCULOUS CHOLECYSTITIS: Status: RESOLVED | Noted: 2021-12-06 | Resolved: 2022-04-11

## 2022-04-11 PROBLEM — M17.10 ARTHRITIS OF KNEE: Status: RESOLVED | Noted: 2019-01-21 | Resolved: 2022-04-11

## 2022-04-11 PROBLEM — N19 RENAL FAILURE: Status: RESOLVED | Noted: 2021-12-03 | Resolved: 2022-04-11

## 2022-04-11 LAB
EXPIRATION DATE: NORMAL
HBA1C MFR BLD: 6.2 %
Lab: NORMAL

## 2022-04-11 PROCEDURE — 36416 COLLJ CAPILLARY BLOOD SPEC: CPT | Performed by: NURSE PRACTITIONER

## 2022-04-11 PROCEDURE — 3044F HG A1C LEVEL LT 7.0%: CPT | Performed by: NURSE PRACTITIONER

## 2022-04-11 PROCEDURE — 99214 OFFICE O/P EST MOD 30 MIN: CPT | Performed by: NURSE PRACTITIONER

## 2022-04-11 PROCEDURE — 83036 HEMOGLOBIN GLYCOSYLATED A1C: CPT | Performed by: NURSE PRACTITIONER

## 2022-04-11 RX ORDER — BUPROPION HYDROCHLORIDE 300 MG/1
300 TABLET ORAL DAILY
Qty: 90 TABLET | Refills: 1 | Status: SHIPPED | OUTPATIENT
Start: 2022-04-11 | End: 2022-12-16 | Stop reason: SDUPTHER

## 2022-04-11 RX ORDER — LISINOPRIL 10 MG/1
TABLET ORAL
Qty: 90 TABLET | Refills: 3 | Status: SHIPPED | OUTPATIENT
Start: 2022-04-11 | End: 2022-12-16 | Stop reason: SDUPTHER

## 2022-04-11 RX ORDER — SIMVASTATIN 20 MG
20 TABLET ORAL DAILY
Qty: 90 TABLET | Refills: 1 | Status: SHIPPED | OUTPATIENT
Start: 2022-04-11 | End: 2022-12-16 | Stop reason: SDUPTHER

## 2022-04-11 RX ORDER — PIOGLITAZONEHYDROCHLORIDE 45 MG/1
45 TABLET ORAL DAILY
Qty: 90 TABLET | Refills: 1 | Status: SHIPPED | OUTPATIENT
Start: 2022-04-11 | End: 2022-12-16 | Stop reason: SDUPTHER

## 2022-04-11 RX ORDER — AMLODIPINE BESYLATE 10 MG/1
10 TABLET ORAL DAILY
Qty: 90 TABLET | Refills: 1 | Status: SHIPPED | OUTPATIENT
Start: 2022-04-11 | End: 2022-12-16 | Stop reason: SDUPTHER

## 2022-04-11 NOTE — PROGRESS NOTES
"Chief Complaint  Diabetes (A1C check ) and Dizziness (Pt would like an ENT referral pt has been feeling dizzy and has had nausea /)    Subjective          Danyel Dash presents to [facility]  History of Present Illness  Diabetes-patient currently on Actos 45 mg daily as directed without side effects.  A1c was last checked October 2020 with result of 6.5.    Hypertension-patient is currently on amlodipine 10 mg and lisinopril 10 mg daily as directed with side effects.  Blood pressures well controlled in the office today.    Hyperlipidemia-patient on simvastatin 20 mg daily as directed with any side effects.  Cholesterol was last checked April 2021 with a total cholesterol of 160 and LDL of 97.    GERD-patient is on Protonix 40 mg and well controlled.    Anxiety-patient is on hydroxyzine 25 mg on a as needed basis along with Wellbutrin  mg daily as directed.    Chronic kidney disease-seeing specialist for this.    Iron def anemia- on ferrous gluconate 270mg daily    Dizziness- lost hearing in right ear 20 years ago told he had meniere's disease.  One week ago the dizziness hit him and he started throwing up and he believes that it is meniere's disease and requested a ENT referral     Objective   Vital Signs:   /58   Pulse 72   Temp 96.9 °F (36.1 °C)   Ht 175.3 cm (69\")   Wt 107 kg (235 lb)   SpO2 99%   BMI 34.70 kg/m²            Physical Exam  Vitals reviewed.   Constitutional:       General: He is not in acute distress.     Appearance: He is well-developed.   HENT:      Head: Normocephalic.   Cardiovascular:      Rate and Rhythm: Normal rate and regular rhythm.      Heart sounds: Normal heart sounds.   Pulmonary:      Effort: Pulmonary effort is normal.      Breath sounds: Normal breath sounds.   Neurological:      Mental Status: He is alert and oriented to person, place, and time.      Gait: Gait normal.   Psychiatric:         Behavior: Behavior normal.         " Thought Content: Thought content normal.         Judgment: Judgment normal.        Result Review :   The following data was reviewed by: JOHANA Reza on 04/11/2022:  CMP    CMP 12/7/21 12/8/21 4/11/22   Glucose 146 (A) 127 (A) 164 (A)   BUN 24 (A) 26 (A) 16   Creatinine 0.98 1.30 (A) 1.26   eGFR Non African Am 75 54 (A)    Sodium 133 (A) 135 (A) 140   Potassium 4.0 3.8 4.7   Chloride 100 102 104   Calcium 8.5 (A) 9.3 9.6   Total Protein   6.2   Albumin 3.10 (A) 3.10 (A) 4.1   Globulin   2.1   Total Bilirubin 0.6 0.6 0.6   Alkaline Phosphatase 45 47 53   AST (SGOT) 69 (A) 50 (A) 12   ALT (SGPT) 66 (A) 62 (A) 10   (A) Abnormal value            CBC    CBC 12/7/21 12/8/21 4/11/22   WBC 12.06 (A) 8.32 7.0   RBC 3.76 (A) 3.88 (A) 4.67   Hemoglobin 11.4 (A) 11.9 (A) 14.1   Hematocrit 34.1 (A) 35.1 (A) 42.0   MCV 90.7 90.5 90   MCH 30.3 30.7 30.2   MCHC 33.4 33.9 33.6   RDW 12.7 12.8 12.4   Platelets 339 432 368   (A) Abnormal value            Lipid Panel    Lipid Panel 4/11/22   Total Cholesterol 152   Triglycerides 100   HDL Cholesterol 37 (A)   VLDL Cholesterol 19   LDL Cholesterol  96   LDL/HDL Ratio 2.6   (A) Abnormal value       Comments are available for some flowsheets but are not being displayed.               Most Recent A1C    HGBA1C Most Recent 4/11/22   Hemoglobin A1C 6.2           PSA    PSA 4/11/22   PSA 0.8      Comments are available for some flowsheets but are not being displayed.                     Assessment and Plan    Diagnoses and all orders for this visit:    1. Type 2 diabetes mellitus without complication, without long-term current use of insulin (HCC) (Primary)  -     POC Glycosylated Hemoglobin (Hb A1C)  -     Comprehensive Metabolic Panel  -     pioglitazone (ACTOS) 45 MG tablet; Take 1 tablet by mouth Daily.  Dispense: 90 tablet; Refill: 1    2. Primary hypertension    3. Familial hypercholesterolemia  -     Comprehensive Metabolic Panel  -     Lipid Panel With LDL / HDL Ratio  -      simvastatin (ZOCOR) 20 MG tablet; Take 1 tablet by mouth Daily.  Dispense: 90 tablet; Refill: 1    4. Gastroesophageal reflux disease, unspecified whether esophagitis present    5. Stage 3a chronic kidney disease (HCC)    6. Anxiety  -     buPROPion XL (WELLBUTRIN XL) 300 MG 24 hr tablet; Take 1 tablet by mouth Daily.  Dispense: 90 tablet; Refill: 1    7. Other iron deficiency anemia  -     CBC & Differential  -     Iron Profile    8. Special screening for malignant neoplasm of prostate  -     PSA Screen    9. Meniere's disease of both ears  -     Ambulatory Referral to ENT (Otolaryngology)    10. Essential hypertension  -     amLODIPine (NORVASC) 10 MG tablet; Take 1 tablet by mouth Daily.  Dispense: 90 tablet; Refill: 1        Follow Up   Return in about 6 months (around 10/11/2022) for Recheck.  Patient was given instructions and counseling regarding his condition or for health maintenance advice. Please see specific information pulled into the AVS if appropriate.     Cont same with meds  Follow up after labs   rto in 6 mons for a1c    Mask and googles worn

## 2022-04-12 LAB
ALBUMIN SERPL-MCNC: 4.1 G/DL (ref 3.7–4.7)
ALBUMIN/GLOB SERPL: 2 {RATIO} (ref 1.2–2.2)
ALP SERPL-CCNC: 53 IU/L (ref 44–121)
ALT SERPL-CCNC: 10 IU/L (ref 0–44)
AST SERPL-CCNC: 12 IU/L (ref 0–40)
BASOPHILS # BLD AUTO: 0 X10E3/UL (ref 0–0.2)
BASOPHILS NFR BLD AUTO: 1 %
BILIRUB SERPL-MCNC: 0.6 MG/DL (ref 0–1.2)
BUN SERPL-MCNC: 16 MG/DL (ref 8–27)
BUN/CREAT SERPL: 13 (ref 10–24)
CALCIUM SERPL-MCNC: 9.6 MG/DL (ref 8.6–10.2)
CHLORIDE SERPL-SCNC: 104 MMOL/L (ref 96–106)
CHOLEST SERPL-MCNC: 152 MG/DL (ref 100–199)
CO2 SERPL-SCNC: 20 MMOL/L (ref 20–29)
CREAT SERPL-MCNC: 1.26 MG/DL (ref 0.76–1.27)
EGFRCR SERPLBLD CKD-EPI 2021: 59 ML/MIN/1.73
EOSINOPHIL # BLD AUTO: 0.1 X10E3/UL (ref 0–0.4)
EOSINOPHIL NFR BLD AUTO: 1 %
ERYTHROCYTE [DISTWIDTH] IN BLOOD BY AUTOMATED COUNT: 12.4 % (ref 11.6–15.4)
GLOBULIN SER CALC-MCNC: 2.1 G/DL (ref 1.5–4.5)
GLUCOSE SERPL-MCNC: 164 MG/DL (ref 65–99)
HCT VFR BLD AUTO: 42 % (ref 37.5–51)
HDLC SERPL-MCNC: 37 MG/DL
HGB BLD-MCNC: 14.1 G/DL (ref 13–17.7)
IMM GRANULOCYTES # BLD AUTO: 0 X10E3/UL (ref 0–0.1)
IMM GRANULOCYTES NFR BLD AUTO: 0 %
IRON SATN MFR SERPL: 59 % (ref 15–55)
IRON SERPL-MCNC: 167 UG/DL (ref 38–169)
LDLC SERPL CALC-MCNC: 96 MG/DL (ref 0–99)
LDLC/HDLC SERPL: 2.6 RATIO (ref 0–3.6)
LYMPHOCYTES # BLD AUTO: 1.1 X10E3/UL (ref 0.7–3.1)
LYMPHOCYTES NFR BLD AUTO: 16 %
MCH RBC QN AUTO: 30.2 PG (ref 26.6–33)
MCHC RBC AUTO-ENTMCNC: 33.6 G/DL (ref 31.5–35.7)
MCV RBC AUTO: 90 FL (ref 79–97)
MONOCYTES # BLD AUTO: 0.4 X10E3/UL (ref 0.1–0.9)
MONOCYTES NFR BLD AUTO: 6 %
NEUTROPHILS # BLD AUTO: 5.3 X10E3/UL (ref 1.4–7)
NEUTROPHILS NFR BLD AUTO: 76 %
PLATELET # BLD AUTO: 368 X10E3/UL (ref 150–450)
POTASSIUM SERPL-SCNC: 4.7 MMOL/L (ref 3.5–5.2)
PROT SERPL-MCNC: 6.2 G/DL (ref 6–8.5)
PSA SERPL-MCNC: 0.8 NG/ML (ref 0–4)
RBC # BLD AUTO: 4.67 X10E6/UL (ref 4.14–5.8)
SODIUM SERPL-SCNC: 140 MMOL/L (ref 134–144)
TIBC SERPL-MCNC: 285 UG/DL (ref 250–450)
TRIGL SERPL-MCNC: 100 MG/DL (ref 0–149)
UIBC SERPL-MCNC: 118 UG/DL (ref 111–343)
VLDLC SERPL CALC-MCNC: 19 MG/DL (ref 5–40)
WBC # BLD AUTO: 7 X10E3/UL (ref 3.4–10.8)

## 2022-05-27 ENCOUNTER — OFFICE VISIT (OUTPATIENT)
Dept: INTERNAL MEDICINE | Facility: CLINIC | Age: 77
End: 2022-05-27

## 2022-05-27 VITALS
SYSTOLIC BLOOD PRESSURE: 124 MMHG | WEIGHT: 233.6 LBS | DIASTOLIC BLOOD PRESSURE: 80 MMHG | BODY MASS INDEX: 34.6 KG/M2 | HEIGHT: 69 IN | TEMPERATURE: 98.3 F | HEART RATE: 67 BPM | OXYGEN SATURATION: 96 %

## 2022-05-27 DIAGNOSIS — I10 PRIMARY HYPERTENSION: ICD-10-CM

## 2022-05-27 DIAGNOSIS — E11.9 TYPE 2 DIABETES MELLITUS WITHOUT COMPLICATION, WITHOUT LONG-TERM CURRENT USE OF INSULIN: Primary | ICD-10-CM

## 2022-05-27 DIAGNOSIS — Z76.89 ENCOUNTER TO ESTABLISH CARE: ICD-10-CM

## 2022-05-27 PROBLEM — I12.9 UNSPECIFIED HYPERTENSIVE KIDNEY DISEASE WITH CHRONIC KIDNEY DISEASE STAGE I THROUGH STAGE IV, OR UNSPECIFIED: Status: ACTIVE | Noted: 2022-03-30

## 2022-05-27 PROCEDURE — 99213 OFFICE O/P EST LOW 20 MIN: CPT | Performed by: NURSE PRACTITIONER

## 2022-05-27 RX ORDER — SUMATRIPTAN 100 MG/1
TABLET, FILM COATED ORAL
COMMUNITY
Start: 2022-04-29 | End: 2022-11-12

## 2022-05-27 RX ORDER — OMEPRAZOLE 40 MG/1
CAPSULE, DELAYED RELEASE ORAL
COMMUNITY
Start: 2022-05-07 | End: 2022-06-15 | Stop reason: HOSPADM

## 2022-05-27 NOTE — ASSESSMENT & PLAN NOTE
Reviewed last A1c with patient at bedside.  Discussed about returning to office for further discussion of diabetes.

## 2022-05-27 NOTE — PROGRESS NOTES
Date of Encounter: 2022  Patient: Danyel Dash,  1945    Subjective     Chief complaint: Establish care, med review    HPI   Patient here today to establish care.  Patient states that his previous PCP is changing specialties.  Patient has no acute complaints today he would like to review his medications.  Patient states overall he is doing well, just wanted to get established.    Review of Systems:  Negative for fever, congestion, chest pain upon exertion, shortness of breath, vision changes, vomiting, dysuria, lymphadenopathy, muscle weakness, numbness, mood changes, rashes.    The following portions of the patient's history were reviewed and updated as appropriate: allergies, current medications, past family history, past medical history, past social history, past surgical history and problem list.    Patient Active Problem List   Diagnosis   • Anemia   • Anxiety   • Chronic kidney disease, stage III (moderate) ([facility])   • Type 2 diabetes mellitus, without long-term current use of insulin (HCC)   • Erectile dysfunction of nonorganic origin   • Gastroesophageal reflux disease   • Hyperlipidemia   • Hypertension   • Iron deficiency anemia   • Special screening for malignant neoplasm of prostate   • Diverticulosis   • Tear of medial meniscus of knee   • Seasonal allergies   • Rheumatic fever   • H/O: gout   • Osteoarthritis of knee   • CKD (chronic kidney disease) stage 2, GFR 60-89 ml/min   • Renal lesion   • Meniere's disease of both ears   • Unspecified hypertensive kidney disease with chronic kidney disease stage I through stage IV, or unspecified     Past Medical History:   Diagnosis Date   • Anemia    • Anxiety    • Arthritis    • Chronic kidney disease     CKD stage 3   • Coronary artery disease     rheumatic fever at age 7   • Diabetes mellitus (HCC)    • GERD (gastroesophageal reflux disease)    • GI bleed    • Hyperlipidemia    • Hypertension    • Low back pain    • Peripheral neuropathy    •  Rheumatic fever    • Spinal stenosis, lumbar      Past Surgical History:   Procedure Laterality Date   • CATARACT EXTRACTION Bilateral    • CHOLECYSTECTOMY WITH INTRAOPERATIVE CHOLANGIOGRAM N/A 12/06/2021    Procedure: CHOLECYSTECTOMY LAPAROSCOPIC;  Surgeon: Didi Telles MD;  Location[facility] MAIN OR;  Service: General;  Laterality: N/A;   • COLONOSCOPY N/A 12/27/2016    tics, IH, polyps   • ENDOSCOPY N/A 12/27/2016    LA Grade B reflux esophagitis, erythematous mucosa in stomach   • ENDOSCOPY N/A 07/07/2017    Two jejunal AVM's    • ENTEROSCOPY SMALL BOWEL  07/07/2017    Procedure: ENTEROSCOPY SMALL BOWEL;  Surgeon: Anil Prakash MD;  Location: [facility] ENDOSCOPY;  Service:    • EPIDURAL BLOCK     • GALLBLADDER SURGERY N/A 02/2022   • KNEE ARTHROPLASTY, PARTIAL REPLACEMENT Left 02/01/2021   • KNEE ARTHROSCOPY Right    • SHOULDER ARTHROSCOPY Right    • TONSILLECTOMY       Family History   Problem Relation Age of Onset   • Diverticulosis Father    • Diverticulitis Father    • Alzheimer's disease Mother    • No Known Problems Sister        Current Outpatient Medications:   •  acetaminophen (TYLENOL) 325 MG tablet, Take 650 mg by mouth Every 6 (Six) Hours As Needed for Mild Pain ., Disp: , Rfl:   •  albuterol (PROAIR RESPICLICK) 108 (90 Base) MCG/ACT inhaler, Inhale 2 puffs., Disp: , Rfl:   •  amLODIPine (NORVASC) 10 MG tablet, Take 1 tablet by mouth Daily., Disp: 90 tablet, Rfl: 1  •  Blood Gluc Meter Disp-Strips device, Pt to monitor blood glucose two times daily, Disp: 1 each, Rfl: 0  •  buPROPion XL (WELLBUTRIN XL) 300 MG 24 hr tablet, Take 1 tablet by mouth Daily., Disp: 90 tablet, Rfl: 1  •  Cholecalciferol (VITAMIN D-3) 1000 UNITS capsule, Take 1,000 Units by mouth Daily., Disp: , Rfl:   •  Diclofenac Sodium (VOLTAREN) 1 % gel gel, , Disp: , Rfl:   •  ferrous gluconate (FERGON) 240 (27 FE) MG tablet, 480 mg., Disp: , Rfl:   •  fluconazole (DIFLUCAN) 200 MG tablet, Take 1 tablet by mouth 1 (One) Time Per  "Week., Disp: 3 tablet, Rfl: 0  •  fluticasone (FLONASE) 50 MCG/ACT nasal spray, 2 sprays into each nostril Daily., Disp: 15.8 mL, Rfl: 0  •  hydrOXYzine (ATARAX) 25 MG tablet, TAKE ONE TABLET BY MOUTH THREE TIMES A DAY AS NEEDED FOR ANXIETY, Disp: 90 tablet, Rfl: 0  •  lisinopril (PRINIVIL,ZESTRIL) 10 MG tablet, TAKE ONE TABLET BY MOUTH DAILY, Disp: 90 tablet, Rfl: 3  •  loratadine (CLARITIN) 10 MG tablet, 10 mg., Disp: , Rfl:   •  pioglitazone (ACTOS) 45 MG tablet, Take 1 tablet by mouth Daily., Disp: 90 tablet, Rfl: 1  •  simvastatin (ZOCOR) 20 MG tablet, Take 1 tablet by mouth Daily., Disp: 90 tablet, Rfl: 1  •  vitamin B-12 (CYANOCOBALAMIN) 1000 MCG tablet, Take 1,000 mcg by mouth Daily., Disp: , Rfl:   •  vitamin C (ASCORBIC ACID) 500 MG tablet, Take 500 mg by mouth Daily., Disp: , Rfl:   •  aspirin 81 MG EC tablet, Take 81 mg by mouth Daily., Disp: , Rfl:   •  omeprazole (priLOSEC) 40 MG capsule, , Disp: , Rfl:   •  SUMAtriptan (IMITREX) 100 MG tablet, , Disp: , Rfl:   •  tamsulosin (FLOMAX) 0.4 MG capsule 24 hr capsule, Take 1 capsule by mouth Daily., Disp: 30 capsule, Rfl: 0  No Known Allergies  Social History     Tobacco Use   • Smoking status: Former Smoker     Packs/day: 1.50     Years: 18.00     Pack years: 27.00     Types: Cigarettes     Quit date:      Years since quittin.4   • Smokeless tobacco: Former User   • Tobacco comment: quit    Vaping Use   • Vaping Use: Never used   Substance Use Topics   • Alcohol use: No   • Drug use: No          Objective   Physical Exam   Vitals:    22 1556   BP: 124/80   Pulse: 67   Temp: 98.3 °F (36.8 °C)   SpO2: 96%   Weight: 106 kg (233 lb 9.6 oz)   Height: 175.3 cm (69\")     Body mass index is 34.5 kg/m².    Constitutional: NAD.  Cardiovascular: RRR. No murmurs. No LE edema b/l. Radial pulses 2+ bilaterally.  Pulmonary: CTA b/l. Good effort.  Integumentary: No rashes or wounds on face or upper extremities.  Psychiatric: Normal affect. Normal " thought content.         Assessment & Plan   Assessment and Plan  Pleasant 76-year-old male with iron deficiency anemia, hypertension, anxiety, osteoarthritis of knee and others here today to establish care.  The following was discussed:    Diagnoses and all orders for this visit:    1. Type 2 diabetes mellitus without complication, without long-term current use of insulin (HCC) (Primary)  Assessment & Plan:  Reviewed last A1c with patient at bedside.  Discussed about returning to office for further discussion of diabetes.      2. Primary hypertension  Assessment & Plan:  Controlled.  Medications reviewed.      3. Encounter to establish care   welcomed patient to practice. Discussed office policies. Reviewed patient reported medical history at bedside.    Patient verbalized understanding of and agreed to plan of care.    Return in about 2 months (around 7/27/2022) for Diabetes Exam .     Jyotsna Lord DNP, FNP-C  Family Medicine  O: [phone]      Please note that portions of this note were completed with a voice recognition program. Please call if questions.

## 2022-06-13 ENCOUNTER — HOSPITAL ENCOUNTER (OUTPATIENT)
Dept: ULTRASOUND IMAGING | Facility: HOSPITAL | Age: 77
Discharge: HOME | End: 2022-06-13
Admitting: INTERNAL MEDICINE

## 2022-06-13 DIAGNOSIS — N28.1 SIMPLE RENAL CYST: ICD-10-CM

## 2022-06-13 PROCEDURE — 76775 US EXAM ABDO BACK WALL LIM: CPT

## 2022-06-15 RX ORDER — PANTOPRAZOLE SODIUM 40 MG/1
TABLET, DELAYED RELEASE ORAL
Qty: 90 TABLET | Refills: 1 | Status: SHIPPED | OUTPATIENT
Start: 2022-06-15 | End: 2022-12-16 | Stop reason: SDUPTHER

## 2022-06-15 NOTE — TELEPHONE ENCOUNTER
Rx Refill Note  Requested Prescriptions     Pending Prescriptions Disp Refills   • pantoprazole (PROTONIX) 40 MG EC tablet [Pharmacy Med Name: PANTOPRAZOLE SOD DR 40 MG TAB] 90 tablet      Sig: TAKE ONE TABLET BY MOUTH DAILY      Last office visit with prescribing clinician: 12/3/2021      Next office visit with prescribing clinician: Visit date not found            Deonte Guo MA  06/15/22, 08:49 EDT

## 2022-06-17 ENCOUNTER — TELEPHONE (OUTPATIENT)
Dept: INTERNAL MEDICINE | Facility: CLINIC | Age: 77
End: 2022-06-17

## 2022-06-17 NOTE — TELEPHONE ENCOUNTER
Caller: Danyel Dash    Relationship: Self    Best call back number:     What medications are you currently taking:   Current Outpatient Medications on File Prior to Visit   Medication Sig Dispense Refill   • acetaminophen (TYLENOL) 325 MG tablet Take 650 mg by mouth Every 6 (Six) Hours As Needed for Mild Pain .     • albuterol (PROAIR RESPICLICK) 108 (90 Base) MCG/ACT inhaler Inhale 2 puffs.     • amLODIPine (NORVASC) 10 MG tablet Take 1 tablet by mouth Daily. 90 tablet 1   • amoxicillin (AMOXIL) 875 MG tablet Take 1 tablet by mouth Every 12 (Twelve) Hours for 10 days. 20 tablet 0   • aspirin 81 MG EC tablet Take 81 mg by mouth Daily.     • Blood Gluc Meter Disp-Strips device Pt to monitor blood glucose two times daily 1 each 0   • buPROPion XL (WELLBUTRIN XL) 300 MG 24 hr tablet Take 1 tablet by mouth Daily. 90 tablet 1   • Cholecalciferol (VITAMIN D-3) 1000 UNITS capsule Take 1,000 Units by mouth Daily.     • Diclofenac Sodium (VOLTAREN) 1 % gel gel      • ferrous gluconate (FERGON) 240 (27 FE) MG tablet 480 mg.     • fluconazole (DIFLUCAN) 200 MG tablet Take 1 tablet by mouth 1 (One) Time Per Week. 3 tablet 0   • fluticasone (FLONASE) 50 MCG/ACT nasal spray 2 sprays into each nostril Daily. 15.8 mL 0   • hydrOXYzine (ATARAX) 25 MG tablet TAKE ONE TABLET BY MOUTH THREE TIMES A DAY AS NEEDED FOR ANXIETY 90 tablet 0   • lisinopril (PRINIVIL,ZESTRIL) 10 MG tablet TAKE ONE TABLET BY MOUTH DAILY 90 tablet 3   • loratadine (CLARITIN) 10 MG tablet 10 mg.     • pantoprazole (PROTONIX) 40 MG EC tablet TAKE ONE TABLET BY MOUTH DAILY 90 tablet 1   • pioglitazone (ACTOS) 45 MG tablet Take 1 tablet by mouth Daily. 90 tablet 1   • promethazine-dextromethorphan (PROMETHAZINE-DM) 6.25-15 MG/5ML syrup Take 5 mL by mouth 4 (Four) Times a Day As Needed for Cough. 120 mL 0   • simvastatin (ZOCOR) 20 MG tablet Take 1 tablet by mouth Daily. 90 tablet 1   • SUMAtriptan (IMITREX) 100 MG tablet      • tamsulosin  (FLOMAX) 0.4 MG capsule 24 hr capsule Take 1 capsule by mouth Daily. 30 capsule 0   • vitamin B-12 (CYANOCOBALAMIN) 1000 MCG tablet Take 1,000 mcg by mouth Daily.     • vitamin C (ASCORBIC ACID) 500 MG tablet Take 500 mg by mouth Daily.       No current facility-administered medications on file prior to visit.          When did you start taking these medications: A COUPLE OF YEARS    Which medication are you concerned about: HYDROXIZINE    Who prescribed you this medication: DR JOANA HOPPER    What are your concerns: PATIENT SAYS THAT HE WAS INFORMED BY HIS PHARMACIST THAT PATIENTS OVER 65 YEARS OLD SHOULD NOT TAKE THE MEDICATION .  PATIENT SAYS HE IS 76 AND TAKES THE MEDICATION FOR ANXIETY AND WANTS TO KNOW IF HE SHOULD CONTINUE TAKING THE MEDICATION. HE WANTS TO BE CONTACTED TO DISCUSS THIS.     How long have you had these concerns:

## 2022-06-17 NOTE — TELEPHONE ENCOUNTER
Spoke to patient, he is seeing PARMINDER Lord on 6/20/22 to review medications and talk about his on going anxiety.

## 2022-06-17 NOTE — TELEPHONE ENCOUNTER
This was prescribed by his prior nurse practitioner.  If he has tolerated the medication well previously, he can continue to use it.  The problem is is that sometimes hydroxyzine causes dizziness, dry mouth, and sometimes even confusion at his age.  If he is having the symptoms we should have him in to discuss alternative medications for his anxiety.

## 2022-06-20 ENCOUNTER — OFFICE VISIT (OUTPATIENT)
Dept: INTERNAL MEDICINE | Facility: CLINIC | Age: 77
End: 2022-06-20

## 2022-06-20 VITALS
HEIGHT: 69 IN | HEART RATE: 63 BPM | DIASTOLIC BLOOD PRESSURE: 82 MMHG | TEMPERATURE: 98.2 F | OXYGEN SATURATION: 98 % | SYSTOLIC BLOOD PRESSURE: 126 MMHG | WEIGHT: 231 LBS | BODY MASS INDEX: 34.21 KG/M2

## 2022-06-20 DIAGNOSIS — F41.9 ANXIETY: ICD-10-CM

## 2022-06-20 PROCEDURE — 99213 OFFICE O/P EST LOW 20 MIN: CPT | Performed by: NURSE PRACTITIONER

## 2022-06-20 RX ORDER — HYDROXYZINE HYDROCHLORIDE 25 MG/1
25 TABLET, FILM COATED ORAL 3 TIMES DAILY PRN
Qty: 30 TABLET | Refills: 3 | Status: SHIPPED | OUTPATIENT
Start: 2022-06-20 | End: 2022-10-10

## 2022-06-20 NOTE — PROGRESS NOTES
Date of Encounter: 2022  Patient: Danyel Dash,  1945    Subjective     Chief complaint: Medication discussion    HPI   Patient here today to talk about his anxiety medication.  Patient states that he received a letter in the mail from his insurance carrier telling them but they will no longer cover his hydroxyzine prescription.  Patient states that he was warned because of his age she should not be taking the medication.  Patient states that he does well on the medication and uses it as needed.  Patient has an adult daughter living at home unexpectedly that is causing increased stress for him and his wife.    Patient states that he has no side effects from the medication and wants to continue taking it if he can.  Is not sure what the cost will be without insurance.  No other acute concerns today.  Otherwise doing well.    Review of Systems:  Negative for fever, congestion, chest pain upon exertion, shortness of breath, vision changes, vomiting, dysuria, lymphadenopathy, muscle weakness, numbness, mood changes, rashes.    The following portions of the patient's history were reviewed and updated as appropriate: allergies, current medications, past family history, past medical history, past social history, past surgical history and problem list.    Patient Active Problem List   Diagnosis   • Anemia   • Anxiety   • Stage 3a chronic kidney disease (HCC)   • Type 2 diabetes mellitus, without long-term current use of insulin (HCC)   • Erectile dysfunction of nonorganic origin   • Gastroesophageal reflux disease   • Hyperlipidemia   • Hypertension   • Iron deficiency anemia   • Special screening for malignant neoplasm of prostate   • Diverticulosis   • Tear of medial meniscus of knee   • Seasonal allergies   • Rheumatic fever   • H/O: gout   • Osteoarthritis of knee   • CKD (chronic kidney disease) stage 2, GFR 60-89 ml/min   • Renal lesion   • Meniere's disease of both ears   • Unspecified hypertensive  kidney disease with chronic kidney disease stage I through stage IV, or unspecified     Past Medical History:   Diagnosis Date   • Anemia    • Anxiety    • Arthritis    • Chronic kidney disease     CKD stage 3   • Coronary artery disease     rheumatic fever at age 7   • Diabetes mellitus (HCC)    • GERD (gastroesophageal reflux disease)    • GI bleed    • Hyperlipidemia    • Hypertension    • Low back pain    • Peripheral neuropathy    • Rheumatic fever    • Spinal stenosis, lumbar      Past Surgical History:   Procedure Laterality Date   • CATARACT EXTRACTION Bilateral    • CHOLECYSTECTOMY WITH INTRAOPERATIVE CHOLANGIOGRAM N/A 12/06/2021    Procedure: CHOLECYSTECTOMY LAPAROSCOPIC;  Surgeon: Didi Telles MD;  Location: [facility] MAIN OR;  Service: General;  Laterality: N/A;   • COLONOSCOPY N/A 12/27/2016    tics, IH, polyps   • ENDOSCOPY N/A 12/27/2016    LA Grade B reflux esophagitis, erythematous mucosa in stomach   • ENDOSCOPY N/A 07/07/2017    Two jejunal AVM's    • ENTEROSCOPY SMALL BOWEL  07/07/2017    Procedure: ENTEROSCOPY SMALL BOWEL;  Surgeon: Anil Prakash MD;  Location: [facility] ENDOSCOPY;  Service:    • EPIDURAL BLOCK     • GALLBLADDER SURGERY N/A 02/2022   • KNEE ARTHROPLASTY, PARTIAL REPLACEMENT Left 02/01/2021   • KNEE ARTHROSCOPY Right    • SHOULDER ARTHROSCOPY Right    • TONSILLECTOMY       Family History   Problem Relation Age of Onset   • Diverticulosis Father    • Diverticulitis Father    • Alzheimer's disease Mother    • No Known Problems Sister        Current Outpatient Medications:   •  acetaminophen (TYLENOL) 325 MG tablet, Take 650 mg by mouth Every 6 (Six) Hours As Needed for Mild Pain ., Disp: , Rfl:   •  albuterol (PROAIR RESPICLICK) 108 (90 Base) MCG/ACT inhaler, Inhale 2 puffs., Disp: , Rfl:   •  amLODIPine (NORVASC) 10 MG tablet, Take 1 tablet by mouth Daily., Disp: 90 tablet, Rfl: 1  •  aspirin 81 MG EC tablet, Take 81 mg by mouth Daily., Disp: , Rfl:   •  Blood Gluc Meter  Disp-Strips device, Pt to monitor blood glucose two times daily, Disp: 1 each, Rfl: 0  •  buPROPion XL (WELLBUTRIN XL) 300 MG 24 hr tablet, Take 1 tablet by mouth Daily., Disp: 90 tablet, Rfl: 1  •  Cholecalciferol (VITAMIN D-3) 1000 UNITS capsule, Take 1,000 Units by mouth Daily., Disp: , Rfl:   •  Diclofenac Sodium (VOLTAREN) 1 % gel gel, , Disp: , Rfl:   •  ferrous gluconate (FERGON) 240 (27 FE) MG tablet, 480 mg., Disp: , Rfl:   •  fluconazole (DIFLUCAN) 200 MG tablet, Take 1 tablet by mouth 1 (One) Time Per Week., Disp: 3 tablet, Rfl: 0  •  fluticasone (FLONASE) 50 MCG/ACT nasal spray, 2 sprays into each nostril Daily., Disp: 15.8 mL, Rfl: 0  •  hydrOXYzine (ATARAX) 25 MG tablet, Take 1 tablet by mouth 3 (Three) Times a Day As Needed for Anxiety. for anxiety, Disp: 30 tablet, Rfl: 3  •  lisinopril (PRINIVIL,ZESTRIL) 10 MG tablet, TAKE ONE TABLET BY MOUTH DAILY, Disp: 90 tablet, Rfl: 3  •  loratadine (CLARITIN) 10 MG tablet, 10 mg., Disp: , Rfl:   •  pantoprazole (PROTONIX) 40 MG EC tablet, TAKE ONE TABLET BY MOUTH DAILY, Disp: 90 tablet, Rfl: 1  •  pioglitazone (ACTOS) 45 MG tablet, Take 1 tablet by mouth Daily., Disp: 90 tablet, Rfl: 1  •  promethazine-dextromethorphan (PROMETHAZINE-DM) 6.25-15 MG/5ML syrup, Take 5 mL by mouth 4 (Four) Times a Day As Needed for Cough., Disp: 120 mL, Rfl: 0  •  simvastatin (ZOCOR) 20 MG tablet, Take 1 tablet by mouth Daily., Disp: 90 tablet, Rfl: 1  •  SUMAtriptan (IMITREX) 100 MG tablet, , Disp: , Rfl:   •  tamsulosin (FLOMAX) 0.4 MG capsule 24 hr capsule, Take 1 capsule by mouth Daily., Disp: 30 capsule, Rfl: 0  •  vitamin B-12 (CYANOCOBALAMIN) 1000 MCG tablet, Take 1,000 mcg by mouth Daily., Disp: , Rfl:   •  vitamin C (ASCORBIC ACID) 500 MG tablet, Take 500 mg by mouth Daily., Disp: , Rfl:   No Known Allergies  Social History     Tobacco Use   • Smoking status: Former Smoker     Packs/day: 1.50     Years: 18.00     Pack years: 27.00     Types: Cigarettes     Quit date: 2000  "    Years since quittin.4   • Smokeless tobacco: Former User   • Tobacco comment: quit    Vaping Use   • Vaping Use: Never used   Substance Use Topics   • Alcohol use: No   • Drug use: No          Objective   Physical Exam   Vitals:    22 1152   BP: 126/82   Pulse: 63   Temp: 98.2 °F (36.8 °C)   TempSrc: Temporal   SpO2: 98%   Weight: 105 kg (231 lb)   Height: 175.3 cm (69\")     Body mass index is 34.11 kg/m².    Constitutional: NAD.  Psychiatric: Normal affect. Normal thought content.           Assessment & Plan   Assessment and Plan  Pleasant 76-year-old male with reflux, diverticulosis, stage III chronic kidney disease, renal lesion and others here today to discuss the following:  Diagnoses and all orders for this visit:    1. Anxiety  Assessment & Plan:  Discussed about medication side effects with patient in detail.  Answered some questions from the patient.  Patient is going to try picking up prescription using good Rx coupon.  Discussed about looking at other options to treat anxiety.  Also discussed about possible counseling.  Patient is not interested at this time.  We will continue to monitor.    Orders:  -     hydrOXYzine (ATARAX) 25 MG tablet; Take 1 tablet by mouth 3 (Three) Times a Day As Needed for Anxiety. for anxiety  Dispense: 30 tablet; Refill: 3       Patient verbalized understanding of and agreed to plan of care.    Return if symptoms worsen or fail to improve.     A total of 20 minutes of time was spent on this encounter today.  This includes reviewing the patient's records, face-to-face time, documentation.    Jyotsna Lord DNP, FNP-C  Family Medicine  O: [phone]      Please note that portions of this note were completed with a voice recognition program. Please call if questions.     "

## 2022-06-20 NOTE — ASSESSMENT & PLAN NOTE
Discussed about medication side effects with patient in detail.  Answered some questions from the patient.  Patient is going to try picking up prescription using good Rx coupon.  Discussed about looking at other options to treat anxiety.  Also discussed about possible counseling.  Patient is not interested at this time.  We will continue to monitor.

## 2022-07-18 ENCOUNTER — TELEPHONE (OUTPATIENT)
Dept: INTERNAL MEDICINE | Facility: CLINIC | Age: 77
End: 2022-07-18

## 2022-07-18 DIAGNOSIS — B02.9 HERPES ZOSTER WITHOUT COMPLICATION: Primary | ICD-10-CM

## 2022-07-18 RX ORDER — GABAPENTIN 300 MG/1
CAPSULE ORAL
Qty: 18 CAPSULE | Refills: 0 | Status: SHIPPED | OUTPATIENT
Start: 2022-07-18 | End: 2022-12-16

## 2022-07-18 NOTE — TELEPHONE ENCOUNTER
Caller: Danyel Dash    Relationship: Self    Best call back number: [phone]     Requested Prescriptions:    SHINGLES.  PATIENT IS REQUESTING A MEDICATION FOR SHINGLES PAIN.  HE STATES HE WAS TREATED AT THE URGENT CARE BUT [facility]IDE NOT PROVIDE ANYTHING FOR THE PAIN    Pharmacy where request should be sent:      [facility] [address] [address] [facility] AT [address] - [phone] [phone] [address]  Does the patient have less than a 3 day supply:  [x] Yes  [] No    Sloane Islased Rep   07/18/22 12:10 EDT     PLEASE ADVISE.

## 2022-07-18 NOTE — TELEPHONE ENCOUNTER
WILBERTO for pt advising that Rx was sent in. I advised pt of instructions as well.    HUB TO READ: Rx sent to [facility] in [place].

## 2022-07-27 ENCOUNTER — OFFICE VISIT (OUTPATIENT)
Dept: INTERNAL MEDICINE | Facility: CLINIC | Age: 77
End: 2022-07-27

## 2022-07-27 VITALS
HEART RATE: 62 BPM | TEMPERATURE: 97.4 F | HEIGHT: 69 IN | WEIGHT: 235 LBS | DIASTOLIC BLOOD PRESSURE: 88 MMHG | SYSTOLIC BLOOD PRESSURE: 126 MMHG | OXYGEN SATURATION: 96 % | BODY MASS INDEX: 34.8 KG/M2

## 2022-07-27 DIAGNOSIS — I10 PRIMARY HYPERTENSION: ICD-10-CM

## 2022-07-27 DIAGNOSIS — H81.03 MENIERE'S DISEASE OF BOTH EARS: ICD-10-CM

## 2022-07-27 DIAGNOSIS — Z00.00 ENCOUNTER FOR SUBSEQUENT ANNUAL WELLNESS VISIT (AWV) IN MEDICARE PATIENT: Primary | ICD-10-CM

## 2022-07-27 DIAGNOSIS — Z86.19 HISTORY OF SHINGLES: ICD-10-CM

## 2022-07-27 DIAGNOSIS — J30.2 SEASONAL ALLERGIES: ICD-10-CM

## 2022-07-27 DIAGNOSIS — E11.9 TYPE 2 DIABETES MELLITUS WITHOUT COMPLICATION, WITHOUT LONG-TERM CURRENT USE OF INSULIN: ICD-10-CM

## 2022-07-27 DIAGNOSIS — E78.01 FAMILIAL HYPERCHOLESTEROLEMIA: ICD-10-CM

## 2022-07-27 DIAGNOSIS — K21.9 GASTROESOPHAGEAL REFLUX DISEASE, UNSPECIFIED WHETHER ESOPHAGITIS PRESENT: ICD-10-CM

## 2022-07-27 DIAGNOSIS — F41.9 ANXIETY: ICD-10-CM

## 2022-07-27 DIAGNOSIS — N18.31 STAGE 3A CHRONIC KIDNEY DISEASE: ICD-10-CM

## 2022-07-27 PROBLEM — S83.249A TEAR OF MEDIAL MENISCUS OF KNEE: Status: RESOLVED | Noted: 2020-11-27 | Resolved: 2022-07-27

## 2022-07-27 PROBLEM — I00 RHEUMATIC FEVER: Status: RESOLVED | Noted: 2021-12-03 | Resolved: 2022-07-27

## 2022-07-27 PROBLEM — I12.9 UNSPECIFIED HYPERTENSIVE KIDNEY DISEASE WITH CHRONIC KIDNEY DISEASE STAGE I THROUGH STAGE IV, OR UNSPECIFIED: Status: RESOLVED | Noted: 2022-03-30 | Resolved: 2022-07-27

## 2022-07-27 PROCEDURE — 1159F MED LIST DOCD IN RCRD: CPT | Performed by: NURSE PRACTITIONER

## 2022-07-27 PROCEDURE — G0439 PPPS, SUBSEQ VISIT: HCPCS | Performed by: NURSE PRACTITIONER

## 2022-07-27 PROCEDURE — 1170F FXNL STATUS ASSESSED: CPT | Performed by: NURSE PRACTITIONER

## 2022-07-27 PROCEDURE — 1125F AMNT PAIN NOTED PAIN PRSNT: CPT | Performed by: NURSE PRACTITIONER

## 2022-07-27 RX ORDER — VALACYCLOVIR HYDROCHLORIDE 500 MG/1
TABLET, FILM COATED ORAL
COMMUNITY
Start: 2022-07-07 | End: 2022-12-16 | Stop reason: SDUPTHER

## 2022-07-27 NOTE — ASSESSMENT & PLAN NOTE
Discussed with patient about medications.  Discussed about healing process with patient.  Answered some questions from the patient.  Also discussed about future vaccination with zoster vaccine.

## 2022-07-27 NOTE — ASSESSMENT & PLAN NOTE
Stable.  Reviewed labs with patient at bedside.  Patient continues to follow with kidney specialist.  Next visit in September.  We will continue to monitor.

## 2022-07-27 NOTE — PROGRESS NOTES
Date of Encounter: 2022  Patient: Danyel Dash,  1945    SUBSEQUENT MEDICARE WELLNESS VISIT  Subjective   History of Presenting Illness  Chief complaint: Wellness, follow-up shingles    Patient here today for medical awareness visit.  Patient states that he is doing okay today.  Patient states that he was treated for shingles earlier this month.  Still has a rash and slight discomfort on his neck where the most recent outbreak occurred.  Patient states has been doing well on the medication given to him.    Patient states his blood pressure has been well controlled at home.  Doing well on all medications.  Patient states he recently stopped taking aspirin.  Continues to follow with kidney specialist.  States that his mood has been well controlled with current meds.  Denies any acute needs at this time.      Pain  In the past 7 days, how much pain have you felt? Only some slight discomfort over shingles rash.    Review of Systems:  Negative for fever, congestion, chest pain upon exertion, shortness of breath, vision changes, vomiting, dysuria, lymphadenopathy, muscle weakness, numbness, mood changes, rashes.    Health Risk Assessment:    Recent Hospitalizations:  Recently treated at the following:  [facility] December gallbladder removed.    Current Medical Providers:  Patient Care Team:  Jyotsna Lord APRN as PCP - General (Family Medicine)  Perlita Sandoval MD as Consulting Physician (Hematology and Oncology)  Anil Prakash MD as Referring Physician (Gastroenterology)  Danyel Vlaencia MD as Consulting Physician (Hematology and Oncology)    Smoking Status:  Social History     Tobacco Use   Smoking Status Former Smoker   • Packs/day: 1.50   • Years: 18.00   • Pack years: 27.00   • Types: Cigarettes   • Quit date:    • Years since quittin.5   Smokeless Tobacco Former User   Tobacco Comment    quit        Alcohol Consumption:  Social History     Substance and Sexual  Activity   Alcohol Use No       Depression Screen:   PHQ-2/PHQ-9 Depression Screening 7/27/2022   Little Interest or Pleasure in Doing Things 0-->not at all   Feeling Down, Depressed or Hopeless 0-->not at all   PHQ-9: Brief Depression Severity Measure Score 0     I spent more than 16 minutes asking patient questions, counseling and documenting in the chart    Fall Risk Screen:  RODOLFO Fall Risk Assessment was completed, and patient is at LOW risk for falls.Assessment completed on:7/27/2022    Health Habits and Functional and Cognitive Screening:  Functional & Cognitive Status 7/27/2022   Do you have difficulty preparing food and eating? No   Do you have difficulty bathing yourself, getting dressed or grooming yourself? No   Do you have difficulty using the toilet? No   Do you have difficulty moving around from place to place? No   Do you have trouble with steps or getting out of a bed or a chair? No   Do you need help using the phone?  No   Are you deaf or do you have serious difficulty hearing?  No   Do you need help with transportation? No   Do you need help shopping? No   Do you need help preparing meals?  No   Do you need help with housework?  No   Do you need help with laundry? No   Do you need help taking your medications? No   Do you need help managing money? No   Do you have difficulty concentrating, remembering or making decisions? No       Does the patient have evidence of cognitive impairment? No    Aspirin use counseling:Does not need ASA (and currently is not on it)    Recent Lab Results:        Age-appropriate Screening Schedule:  Refer to the list below for future screening recommendations based on patient's age, sex and/or medical conditions. Orders for these recommended tests are listed in the plan section. The patient has been provided with a written plan.    Health Maintenance   Topic Date Due   • TDAP/TD VACCINES (1 - Tdap) Never done   • ZOSTER VACCINE (1 of 2) Never done   • DIABETIC FOOT EXAM   Never done   • URINE MICROALBUMIN  10/06/2018   • INFLUENZA VACCINE  10/01/2022   • HEMOGLOBIN A1C  10/11/2022   • DIABETIC EYE EXAM  11/24/2022   • LIPID PANEL  04/11/2023       Compared to one year ago, the patient feels his physical health is the same.  Compared to one year ago, the patient feels his mental health is the same.    Reviewed chart for potential of high risk medication in the elderly: yes  Reviewed chart for potential of harmful drug interactions in the elderly:yes    Advanced Care Planning:  Advance Care Planning   ACP discussion was held with the patient during this visit. Patient has an advance directive (not in EMR), copy requested.     The following portions of the patient's history were reviewed and updated as appropriate: allergies, current medications, past family history, past medical history, past social history, past surgical history and problem list.    Patient Active Problem List   Diagnosis   • Anemia   • Anxiety   • Stage 3a chronic kidney disease (HCC)   • Type 2 diabetes mellitus, without long-term current use of insulin (HCC)   • Erectile dysfunction of nonorganic origin   • Gastroesophageal reflux disease   • Hyperlipidemia   • Hypertension   • Iron deficiency anemia   • Special screening for malignant neoplasm of prostate   • Diverticulosis   • Seasonal allergies   • H/O: gout   • Osteoarthritis of knee   • CKD (chronic kidney disease) stage 2, GFR 60-89 ml/min   • Renal lesion   • Meniere's disease of both ears   • History of shingles     Past Medical History:   Diagnosis Date   • Anemia    • Anxiety    • Arthritis    • Chronic kidney disease     CKD stage 3   • Coronary artery disease     rheumatic fever at age 7   • Diabetes mellitus (HCC)    • GERD (gastroesophageal reflux disease)    • GI bleed    • Hyperlipidemia    • Hypertension    • Low back pain    • Peripheral neuropathy    • Rheumatic fever    • Rheumatic fever 12/3/2021    Pt states that his heart is only slightly  enlarged and he spent several years in the  and has had a very active life.   • Spinal stenosis, lumbar    • Tear of medial meniscus of knee 11/27/2020   • Unspecified hypertensive kidney disease with chronic kidney disease stage I through stage IV, or unspecified 3/30/2022    Last Assessment & Plan:  Formatting of this note might be different from the original. BP controlled on norvasc & ACEi; no changes today     Past Surgical History:   Procedure Laterality Date   • CATARACT EXTRACTION Bilateral    • CHOLECYSTECTOMY WITH INTRAOPERATIVE CHOLANGIOGRAM N/A 12/06/2021    Procedure: CHOLECYSTECTOMY LAPAROSCOPIC;  Surgeon: Didi Telles MD;  Location: [facility] MAIN OR;  Service: General;  Laterality: N/A;   • COLONOSCOPY N/A 12/27/2016    tics, IH, polyps   • ENDOSCOPY N/A 12/27/2016    LA Grade B reflux esophagitis, erythematous mucosa in stomach   • ENDOSCOPY N/A 07/07/2017    Two jejunal AVM's    • ENTEROSCOPY SMALL BOWEL  07/07/2017    Procedure: ENTEROSCOPY SMALL BOWEL;  Surgeon: Anil Prakash MD;  Location: [facility] ENDOSCOPY;  Service:    • EPIDURAL BLOCK     • GALLBLADDER SURGERY N/A 02/2022   • KNEE ARTHROPLASTY, PARTIAL REPLACEMENT Left 02/01/2021   • KNEE ARTHROSCOPY Right    • SHOULDER ARTHROSCOPY Right    • TONSILLECTOMY       Family History   Problem Relation Age of Onset   • Diverticulosis Father    • Diverticulitis Father    • Alzheimer's disease Mother    • No Known Problems Sister        Current Outpatient Medications:   •  acetaminophen (TYLENOL) 325 MG tablet, Take 650 mg by mouth Every 6 (Six) Hours As Needed for Mild Pain ., Disp: , Rfl:   •  albuterol (PROAIR RESPICLICK) 108 (90 Base) MCG/ACT inhaler, Inhale 2 puffs., Disp: , Rfl:   •  amLODIPine (NORVASC) 10 MG tablet, Take 1 tablet by mouth Daily., Disp: 90 tablet, Rfl: 1  •  Blood Gluc Meter Disp-Strips device, Pt to monitor blood glucose two times daily, Disp: 1 each, Rfl: 0  •  buPROPion XL (WELLBUTRIN XL) 300 MG 24 hr tablet, Take  1 tablet by mouth Daily., Disp: 90 tablet, Rfl: 1  •  Cholecalciferol (VITAMIN D-3) 1000 UNITS capsule, Take 1,000 Units by mouth Daily., Disp: , Rfl:   •  Diclofenac Sodium (VOLTAREN) 1 % gel gel, , Disp: , Rfl:   •  ferrous gluconate (FERGON) 240 (27 FE) MG tablet, 480 mg., Disp: , Rfl:   •  fluconazole (DIFLUCAN) 200 MG tablet, Take 1 tablet by mouth 1 (One) Time Per Week., Disp: 3 tablet, Rfl: 0  •  fluticasone (FLONASE) 50 MCG/ACT nasal spray, 2 sprays into each nostril Daily., Disp: 15.8 mL, Rfl: 0  •  gabapentin (NEURONTIN) 300 MG capsule, Take 1 cap PO qd X1day, then 1 cap BID X1 day, then 1 cap TID for 5 days #18, Disp: 18 capsule, Rfl: 0  •  hydrOXYzine (ATARAX) 25 MG tablet, Take 1 tablet by mouth 3 (Three) Times a Day As Needed for Anxiety. for anxiety, Disp: 30 tablet, Rfl: 3  •  lisinopril (PRINIVIL,ZESTRIL) 10 MG tablet, TAKE ONE TABLET BY MOUTH DAILY, Disp: 90 tablet, Rfl: 3  •  loratadine (CLARITIN) 10 MG tablet, 10 mg., Disp: , Rfl:   •  pantoprazole (PROTONIX) 40 MG EC tablet, TAKE ONE TABLET BY MOUTH DAILY, Disp: 90 tablet, Rfl: 1  •  pioglitazone (ACTOS) 45 MG tablet, Take 1 tablet by mouth Daily., Disp: 90 tablet, Rfl: 1  •  promethazine-dextromethorphan (PROMETHAZINE-DM) 6.25-15 MG/5ML syrup, Take 5 mL by mouth 4 (Four) Times a Day As Needed for Cough., Disp: 120 mL, Rfl: 0  •  simvastatin (ZOCOR) 20 MG tablet, Take 1 tablet by mouth Daily., Disp: 90 tablet, Rfl: 1  •  SUMAtriptan (IMITREX) 100 MG tablet, , Disp: , Rfl:   •  tamsulosin (FLOMAX) 0.4 MG capsule 24 hr capsule, Take 1 capsule by mouth Daily., Disp: 30 capsule, Rfl: 0  •  vitamin B-12 (CYANOCOBALAMIN) 1000 MCG tablet, Take 1,000 mcg by mouth Daily., Disp: , Rfl:   •  vitamin C (ASCORBIC ACID) 500 MG tablet, Take 500 mg by mouth Daily., Disp: , Rfl:   •  valACYclovir (VALTREX) 500 MG tablet, , Disp: , Rfl:   No Known Allergies  Social History     Tobacco Use   • Smoking status: Former Smoker     Packs/day: 1.50     Years: 18.00      "Pack years: 27.00     Types: Cigarettes     Quit date:      Years since quittin.5   • Smokeless tobacco: Former User   • Tobacco comment: quit    Vaping Use   • Vaping Use: Never used   Substance Use Topics   • Alcohol use: No   • Drug use: No          Objective   Physical Exam  Vitals:    22 1132   BP: 126/88   Pulse: 62   Temp: 97.4 °F (36.3 °C)   TempSrc: Temporal   SpO2: 96%   Weight: 107 kg (235 lb)   Height: 175.3 cm (69\")     Body mass index is 34.7 kg/m².  Discussed the patient's BMI with him. The BMI Is above normal range.  Had a discussion with patient today regarding diet and exercise routine..    Constitutional: NAD.  Eyes: Normal conjunctiva.  Cardiovascular: RRR. No murmurs. No LE edema b/l. Radial pulses 2+ bilaterally.  Pulmonary: CTA b/l. Good effort.  Integumentary: No lacerations or wounds on face or upper extremities.  Psychiatric: Normal affect. Normal thought content.       Assessment & Plan   Assessment and Plan  Pleasant 76-year-old male with history of shingles, current primary hypertension, type 2 diabetes, seasonal allergies, familial hypercholesterolemia, Ménière's disease of both ears and others here today for subsequent annual wellness visit.  The following was discussed:    Advance Directive Discussion  Cardiovascular risk  Depression/Dysphoria  Immunizations Discussed/Encouraged (specific immunizations; Tdap, Prevnar 20 (Pneumococcal 20-valent conjugate) and Shingrix )    The above risks/problems have been discussed with the patient.  Pertinent information has been shared with the patient in the After Visit Summary.  Other plans as below:    Diagnoses and all orders for this visit:    1. Encounter for subsequent annual wellness visit (AWV) in Medicare patient (Primary)    2. History of shingles  Assessment & Plan:  Discussed with patient about medications.  Discussed about healing process with patient.  Answered some questions from the patient.  Also discussed about " future vaccination with zoster vaccine.      3. Primary hypertension  Assessment & Plan:  Trolled.  We will continue to monitor.    Orders:  -     CBC & Differential  -     Comprehensive Metabolic Panel  -     Microalbumin / Creatinine Urine Ratio - Urine, Clean Catch    4. Type 2 diabetes mellitus without complication, without long-term current use of insulin (HCC)  -     Hemoglobin A1c  -     Comprehensive Metabolic Panel  -     Microalbumin / Creatinine Urine Ratio - Urine, Clean Catch    5. Seasonal allergies  Assessment & Plan:  Stable.      6. Familial hypercholesterolemia  Assessment & Plan:  Reviewed previous labs with patient at bedside.  Patient to continue taking current medications.      7. Meniere's disease of both ears  Assessment & Plan:  Stable.      8. Gastroesophageal reflux disease, unspecified whether esophagitis present  Assessment & Plan:  Stable.  No acute complaints.  Shauna diet.      9. Stage 3a chronic kidney disease (HCC)  Assessment & Plan:  Stable.  Reviewed labs with patient at bedside.  Patient continues to follow with kidney specialist.  Next visit in September.  We will continue to monitor.      10. Anxiety  Assessment & Plan:  Symptoms controlled on current medications.  We will continue to monitor.        Patient verbalized understanding of and agreed to plan of care.    Follow Up:  3-4 months    An After Visit Summary and PPPS were given to the patient.      Jyotsna Lord DNP, FNP-C  [facility]  O: [phone]    Please note that portions of this note were completed with a voice recognition program. Please call if questions.

## 2022-07-28 LAB
ALBUMIN SERPL-MCNC: 4 G/DL (ref 3.7–4.7)
ALBUMIN/CREAT UR: 3 MG/G CREAT (ref 0–29)
ALBUMIN/GLOB SERPL: 1.8 {RATIO} (ref 1.2–2.2)
ALP SERPL-CCNC: 55 IU/L (ref 44–121)
ALT SERPL-CCNC: 10 IU/L (ref 0–44)
AST SERPL-CCNC: 12 IU/L (ref 0–40)
BASOPHILS # BLD AUTO: 0.1 X10E3/UL (ref 0–0.2)
BASOPHILS NFR BLD AUTO: 1 %
BILIRUB SERPL-MCNC: 0.7 MG/DL (ref 0–1.2)
BUN SERPL-MCNC: 17 MG/DL (ref 8–27)
BUN/CREAT SERPL: 15 (ref 10–24)
CALCIUM SERPL-MCNC: 9.4 MG/DL (ref 8.6–10.2)
CHLORIDE SERPL-SCNC: 103 MMOL/L (ref 96–106)
CO2 SERPL-SCNC: 24 MMOL/L (ref 20–29)
CREAT SERPL-MCNC: 1.15 MG/DL (ref 0.76–1.27)
CREAT UR-MCNC: 155.9 MG/DL
EGFRCR SERPLBLD CKD-EPI 2021: 66 ML/MIN/1.73
EOSINOPHIL # BLD AUTO: 0.1 X10E3/UL (ref 0–0.4)
EOSINOPHIL NFR BLD AUTO: 2 %
ERYTHROCYTE [DISTWIDTH] IN BLOOD BY AUTOMATED COUNT: 13.4 % (ref 11.6–15.4)
GLOBULIN SER CALC-MCNC: 2.2 G/DL (ref 1.5–4.5)
GLUCOSE SERPL-MCNC: 175 MG/DL (ref 65–99)
HBA1C MFR BLD: 6.7 % (ref 4.8–5.6)
HCT VFR BLD AUTO: 43.9 % (ref 37.5–51)
HGB BLD-MCNC: 14.4 G/DL (ref 13–17.7)
IMM GRANULOCYTES # BLD AUTO: 0 X10E3/UL (ref 0–0.1)
IMM GRANULOCYTES NFR BLD AUTO: 1 %
LYMPHOCYTES # BLD AUTO: 1 X10E3/UL (ref 0.7–3.1)
LYMPHOCYTES NFR BLD AUTO: 18 %
MCH RBC QN AUTO: 30.4 PG (ref 26.6–33)
MCHC RBC AUTO-ENTMCNC: 32.8 G/DL (ref 31.5–35.7)
MCV RBC AUTO: 93 FL (ref 79–97)
MICROALBUMIN UR-MCNC: 5.3 UG/ML
MONOCYTES # BLD AUTO: 0.3 X10E3/UL (ref 0.1–0.9)
MONOCYTES NFR BLD AUTO: 6 %
NEUTROPHILS # BLD AUTO: 3.9 X10E3/UL (ref 1.4–7)
NEUTROPHILS NFR BLD AUTO: 72 %
PLATELET # BLD AUTO: 324 X10E3/UL (ref 150–450)
POTASSIUM SERPL-SCNC: 4.7 MMOL/L (ref 3.5–5.2)
PROT SERPL-MCNC: 6.2 G/DL (ref 6–8.5)
RBC # BLD AUTO: 4.74 X10E6/UL (ref 4.14–5.8)
SODIUM SERPL-SCNC: 139 MMOL/L (ref 134–144)
WBC # BLD AUTO: 5.4 X10E3/UL (ref 3.4–10.8)

## 2022-09-13 ENCOUNTER — TELEPHONE (OUTPATIENT)
Dept: ORTHOPEDIC SURGERY | Facility: CLINIC | Age: 77
End: 2022-09-13

## 2022-09-13 DIAGNOSIS — M54.50 LUMBAR SPINE PAIN: ICD-10-CM

## 2022-09-13 DIAGNOSIS — M48.061 SPINAL STENOSIS OF LUMBAR REGION WITHOUT NEUROGENIC CLAUDICATION: Primary | ICD-10-CM

## 2022-09-13 NOTE — TELEPHONE ENCOUNTER
Caller: DAVID WATERMAN    Relationship: SELF    Best call back number: [phone]    What orders are you requesting (i.e. lab or imaging): EPIDURAL     In what timeframe would the patient need to come in: ASAP    Where will you receive your lab/imaging services: [facility]     Additional notes:  PT STATES THAT DR TORRES TOLD HIM TO CALL WHEN HE WAS READY TO HAVE AN EPIDURAL.    1 or 2

## 2022-09-13 NOTE — TELEPHONE ENCOUNTER
Caller: Danyel Dash    Relationship: Self    Best call back number: [phone]    What orders are you requesting (i.e. lab or imaging): EPIDURAL    In what timeframe would the patient need to come in: ASAP    Where will you receive your lab/imaging services: HOSPITAL    Additional notes: PATIENT STATES THAT HIS WIFE'S EPIDURAL ORDER WAS RECD BY THE HOSPITAL, BUT HIS WAS NOT AND HE WOULD LIKE IT SENT OVER

## 2022-09-26 ENCOUNTER — ANESTHESIA EVENT (OUTPATIENT)
Dept: PAIN MEDICINE | Facility: HOSPITAL | Age: 77
End: 2022-09-26

## 2022-09-26 ENCOUNTER — HOSPITAL ENCOUNTER (OUTPATIENT)
Dept: GENERAL RADIOLOGY | Facility: HOSPITAL | Age: 77
Discharge: HOME | End: 2022-09-26

## 2022-09-26 ENCOUNTER — ANESTHESIA (OUTPATIENT)
Dept: PAIN MEDICINE | Facility: HOSPITAL | Age: 77
End: 2022-09-26

## 2022-09-26 ENCOUNTER — HOSPITAL ENCOUNTER (OUTPATIENT)
Dept: PAIN MEDICINE | Facility: HOSPITAL | Age: 77
Discharge: HOME | End: 2022-09-26

## 2022-09-26 VITALS
RESPIRATION RATE: 15 BRPM | SYSTOLIC BLOOD PRESSURE: 147 MMHG | TEMPERATURE: 98.4 F | OXYGEN SATURATION: 98 % | HEART RATE: 76 BPM | DIASTOLIC BLOOD PRESSURE: 78 MMHG

## 2022-09-26 DIAGNOSIS — M17.10 PRIMARY OSTEOARTHRITIS OF KNEE, UNSPECIFIED LATERALITY: Primary | ICD-10-CM

## 2022-09-26 DIAGNOSIS — R52 PAIN: ICD-10-CM

## 2022-09-26 PROCEDURE — 25010000002 METHYLPREDNISOLONE PER 80 MG: Performed by: ANESTHESIOLOGY

## 2022-09-26 PROCEDURE — 77003 FLUOROGUIDE FOR SPINE INJECT: CPT

## 2022-09-26 RX ORDER — LIDOCAINE HYDROCHLORIDE 10 MG/ML
1 INJECTION, SOLUTION INFILTRATION; PERINEURAL ONCE AS NEEDED
Status: DISCONTINUED | OUTPATIENT
Start: 2022-09-26 | End: 2022-09-27 | Stop reason: HOSPADM

## 2022-09-26 RX ORDER — FENTANYL CITRATE 50 UG/ML
50 INJECTION, SOLUTION INTRAMUSCULAR; INTRAVENOUS AS NEEDED
Status: DISCONTINUED | OUTPATIENT
Start: 2022-09-26 | End: 2022-09-27 | Stop reason: HOSPADM

## 2022-09-26 RX ORDER — METHYLPREDNISOLONE ACETATE 80 MG/ML
80 INJECTION, SUSPENSION INTRA-ARTICULAR; INTRALESIONAL; INTRAMUSCULAR; SOFT TISSUE ONCE
Status: COMPLETED | OUTPATIENT
Start: 2022-09-26 | End: 2022-09-26

## 2022-09-26 RX ORDER — SODIUM CHLORIDE 0.9 % (FLUSH) 0.9 %
1-10 SYRINGE (ML) INJECTION AS NEEDED
Status: DISCONTINUED | OUTPATIENT
Start: 2022-09-26 | End: 2022-09-27 | Stop reason: HOSPADM

## 2022-09-26 RX ORDER — MIDAZOLAM HYDROCHLORIDE 1 MG/ML
1 INJECTION INTRAMUSCULAR; INTRAVENOUS AS NEEDED
Status: DISCONTINUED | OUTPATIENT
Start: 2022-09-26 | End: 2022-09-27 | Stop reason: HOSPADM

## 2022-09-26 RX ADMIN — METHYLPREDNISOLONE ACETATE 80 MG: 80 INJECTION, SUSPENSION INTRA-ARTICULAR; INTRALESIONAL; INTRAMUSCULAR; SOFT TISSUE at 13:49

## 2022-09-26 NOTE — H&P
INTERVAL HISTORY:    The patient returns for another Lumbar epidural steroid injection today.  They have received at least 50% improvement since their last injection with a pain level of 4/10 at its worst recently.    Conservative measures tried in the interim rest    Current Outpatient Medications on File Prior to Encounter   Medication Sig Dispense Refill   • acetaminophen (TYLENOL) 325 MG tablet Take 650 mg by mouth Every 6 (Six) Hours As Needed for Mild Pain .     • albuterol (PROAIR RESPICLICK) 108 (90 Base) MCG/ACT inhaler Inhale 2 puffs.     • amLODIPine (NORVASC) 10 MG tablet Take 1 tablet by mouth Daily. 90 tablet 1   • Blood Gluc Meter Disp-Strips device Pt to monitor blood glucose two times daily 1 each 0   • buPROPion XL (WELLBUTRIN XL) 300 MG 24 hr tablet Take 1 tablet by mouth Daily. 90 tablet 1   • Cholecalciferol (VITAMIN D-3) 1000 UNITS capsule Take 1,000 Units by mouth Daily.     • Diclofenac Sodium (VOLTAREN) 1 % gel gel      • ferrous gluconate (FERGON) 240 (27 FE) MG tablet 480 mg.     • fluconazole (DIFLUCAN) 200 MG tablet Take 1 tablet by mouth 1 (One) Time Per Week. 3 tablet 0   • fluticasone (FLONASE) 50 MCG/ACT nasal spray 2 sprays into each nostril Daily. 15.8 mL 0   • gabapentin (NEURONTIN) 300 MG capsule Take 1 cap PO qd X1day, then 1 cap BID X1 day, then 1 cap TID for 5 days #18 18 capsule 0   • hydrOXYzine (ATARAX) 25 MG tablet Take 1 tablet by mouth 3 (Three) Times a Day As Needed for Anxiety. for anxiety 30 tablet 3   • lisinopril (PRINIVIL,ZESTRIL) 10 MG tablet TAKE ONE TABLET BY MOUTH DAILY 90 tablet 3   • loratadine (CLARITIN) 10 MG tablet 10 mg.     • pantoprazole (PROTONIX) 40 MG EC tablet TAKE ONE TABLET BY MOUTH DAILY 90 tablet 1   • pioglitazone (ACTOS) 45 MG tablet Take 1 tablet by mouth Daily. 90 tablet 1   • promethazine-dextromethorphan (PROMETHAZINE-DM) 6.25-15 MG/5ML syrup Take 5 mL by mouth 4 (Four) Times a Day As Needed for Cough. 120 mL 0   • simvastatin (ZOCOR) 20  MG tablet Take 1 tablet by mouth Daily. 90 tablet 1   • SUMAtriptan (IMITREX) 100 MG tablet      • tamsulosin (FLOMAX) 0.4 MG capsule 24 hr capsule Take 1 capsule by mouth Daily. 30 capsule 0   • valACYclovir (VALTREX) 500 MG tablet      • vitamin B-12 (CYANOCOBALAMIN) 1000 MCG tablet Take 1,000 mcg by mouth Daily.     • vitamin C (ASCORBIC ACID) 500 MG tablet Take 500 mg by mouth Daily.       No current facility-administered medications on file prior to encounter.       Past Medical History:   Diagnosis Date   • Anemia    • Anxiety    • Arthritis    • Chronic kidney disease     CKD stage 3   • Coronary artery disease     rheumatic fever at age 7   • Diabetes mellitus (HCC)    • GERD (gastroesophageal reflux disease)    • GI bleed    • Hyperlipidemia    • Hypertension    • Low back pain    • Peripheral neuropathy    • Rheumatic fever    • Rheumatic fever 12/3/2021    Pt states that his heart is only slightly enlarged and he spent several years in the  and has had a very active life.   • Spinal stenosis, lumbar    • Tear of medial meniscus of knee 11/27/2020   • Unspecified hypertensive kidney disease with chronic kidney disease stage I through stage IV, or unspecified 3/30/2022    Last Assessment & Plan:  Formatting of this note might be different from the original. BP controlled on norvasc & ACEi; no changes today       No hematologic infectious or constitutional symptoms  Negative screen for MARTINEZ      Exam:  /74 (BP Location: Left arm, Patient Position: Lying)   Pulse 71   Temp 36.9 °C (98.4 °F) (Infrared)   Resp 16   SpO2 98%   Airway Mallampatti 2  Alert and oriented      Diagnosis:  Post-Op Diagnosis Codes:     * Spinal stenosis of lumbar region with neurogenic claudication [M48.062]    Plan:  Lumbar epidural steroid injection under fluoroscopic guidance    I have encouraged them to continue:  1.  Physical therapy exercises at home as prescribed by physical therapy or from the pain clinic  handout.  Continuation of these exercises every day, or multiple times per week, even when the patient has good pain relief, was stressed to the patient as a preventative measure to decrease the frequency and severity of future pain episodes.  2.  Continue pain medicines as already prescribed.  If patient not currently taking any, it is recommended to begin Acetaminophen 1000 mg po q 8 hours.  If other medicines containing Acetaminophen are currently prescribed, maintain daily dose at 3000mg.    3.  If they can tolerate NSAIDS, it is recommended to take Ibuprofen 600 mg po q 6 hours for 7 days during pain exacerbations.   Alternatively, they may substitute an NSAID of their choice (e.g. Aleve)  4.  Heat and ice to the affected area as tolerated for pain control.   5.  Low impact exercise such as walking or water exercise was recommended to maintain overall health and aid in weight control.   6.  Follow up as needed for subsequent injections.

## 2022-09-26 NOTE — ANESTHESIA PROCEDURE NOTES
PAIN Epidural block      Patient reassessed immediately prior to procedure    Patient location during procedure: pain clinic  Start Time: 9/26/2022 1:43 PM  Stop Time: 9/26/2022 1:48 PM  Indication:procedure for pain  Performed By  Anesthesiologist: Chava Babin MD  Preanesthetic Checklist  Completed: patient identified, IV checked, risks and benefits discussed, surgical consent, monitors and equipment checked, pre-op evaluation and timeout performed  Additional Notes  Diagnosis:  Post-Op Diagnosis Codes:     * Spinal stenosis of lumbar region with neurogenic claudication (M48.062)      Prep:  Pt Position:prone  Sterile Tech:gloves, mask and sterile barrier  Prep:chlorhexidine gluconate and isopropyl alcohol  Monitoring:blood pressure monitoring, continuous pulse oximetry and EKG  Procedure:Sedation: no     Approach:right paramedian  Guidance: fluoroscopy  Location:lumbar  Level:4-5  Needle Type:Tuohy  Needle Gauge:20  Aspiration:negative  Test Dose:negative  Medications:  Preservative Free Saline:3mL  Comments:No dye due to allergyDepomedrol:80 mg  Post Assessment:  Dressing:occlusive dressing applied  Pt Tolerance:patient tolerated the procedure well with no apparent complications  Complications:no

## 2022-10-08 DIAGNOSIS — F41.9 ANXIETY: ICD-10-CM

## 2022-10-10 RX ORDER — HYDROXYZINE HYDROCHLORIDE 25 MG/1
TABLET, FILM COATED ORAL
Qty: 30 TABLET | Refills: 3 | Status: SHIPPED | OUTPATIENT
Start: 2022-10-10 | End: 2022-12-16 | Stop reason: SDUPTHER

## 2022-10-10 NOTE — TELEPHONE ENCOUNTER
Rx Refill Note  Requested Prescriptions     Pending Prescriptions Disp Refills   • hydrOXYzine (ATARAX) 25 MG tablet [Pharmacy Med Name: hydrOXYzine HCL 25 MG TABLET] 30 tablet 3     Sig: TAKE ONE TABLET BY MOUTH THREE TIMES A DAY AS NEEDED FOR ANXIETY      Last office visit with prescribing clinician: 7/27/2022      Next office visit with prescribing clinician: 12/6/2022            Tisha Salmeron MA  10/10/22, 10:34 EDT

## 2022-10-24 DIAGNOSIS — E78.01 FAMILIAL HYPERCHOLESTEROLEMIA: ICD-10-CM

## 2022-10-24 DIAGNOSIS — K21.9 GASTROESOPHAGEAL REFLUX DISEASE: ICD-10-CM

## 2022-10-24 RX ORDER — SIMVASTATIN 20 MG
TABLET ORAL
Qty: 90 TABLET | Refills: 1 | OUTPATIENT
Start: 2022-10-24

## 2022-10-24 RX ORDER — OMEPRAZOLE 40 MG/1
CAPSULE, DELAYED RELEASE ORAL
Qty: 90 CAPSULE | Refills: 3 | OUTPATIENT
Start: 2022-10-24

## 2022-11-12 ENCOUNTER — APPOINTMENT (OUTPATIENT)
Dept: GENERAL RADIOLOGY | Facility: HOSPITAL | Age: 77
End: 2022-11-12

## 2022-11-12 ENCOUNTER — HOSPITAL ENCOUNTER (EMERGENCY)
Facility: HOSPITAL | Age: 77
Discharge: HOME | End: 2022-11-12
Attending: EMERGENCY MEDICINE | Admitting: EMERGENCY MEDICINE

## 2022-11-12 VITALS
SYSTOLIC BLOOD PRESSURE: 186 MMHG | HEIGHT: 69 IN | OXYGEN SATURATION: 95 % | RESPIRATION RATE: 18 BRPM | HEART RATE: 60 BPM | BODY MASS INDEX: 34.36 KG/M2 | TEMPERATURE: 97.1 F | WEIGHT: 232 LBS | DIASTOLIC BLOOD PRESSURE: 74 MMHG

## 2022-11-12 DIAGNOSIS — U07.1 COVID-19: Primary | ICD-10-CM

## 2022-11-12 LAB
ALBUMIN SERPL-MCNC: 3.5 G/DL (ref 3.5–5.2)
ALBUMIN/GLOB SERPL: 1.3 G/DL
ALP SERPL-CCNC: 52 U/L (ref 39–117)
ALT SERPL W P-5'-P-CCNC: 15 U/L (ref 1–41)
ANION GAP SERPL CALCULATED.3IONS-SCNC: 11.9 MMOL/L (ref 5–15)
AST SERPL-CCNC: 18 U/L (ref 1–40)
BASOPHILS # BLD AUTO: 0.02 10*3/MM3 (ref 0–0.2)
BASOPHILS NFR BLD AUTO: 0.5 % (ref 0–1.5)
BILIRUB SERPL-MCNC: 0.6 MG/DL (ref 0–1.2)
BUN SERPL-MCNC: 15 MG/DL (ref 8–23)
BUN/CREAT SERPL: 14.6 (ref 7–25)
CALCIUM SPEC-SCNC: 8.9 MG/DL (ref 8.6–10.5)
CHLORIDE SERPL-SCNC: 106 MMOL/L (ref 98–107)
CO2 SERPL-SCNC: 24.1 MMOL/L (ref 22–29)
CREAT SERPL-MCNC: 1.03 MG/DL (ref 0.76–1.27)
DEPRECATED RDW RBC AUTO: 42 FL (ref 37–54)
EGFRCR SERPLBLD CKD-EPI 2021: 75.3 ML/MIN/1.73
EOSINOPHIL # BLD AUTO: 0.11 10*3/MM3 (ref 0–0.4)
EOSINOPHIL NFR BLD AUTO: 3 % (ref 0.3–6.2)
ERYTHROCYTE [DISTWIDTH] IN BLOOD BY AUTOMATED COUNT: 12.3 % (ref 12.3–15.4)
GLOBULIN UR ELPH-MCNC: 2.7 GM/DL
GLUCOSE SERPL-MCNC: 110 MG/DL (ref 65–99)
HCT VFR BLD AUTO: 40.6 % (ref 37.5–51)
HGB BLD-MCNC: 13.5 G/DL (ref 13–17.7)
IMM GRANULOCYTES # BLD AUTO: 0.02 10*3/MM3 (ref 0–0.05)
IMM GRANULOCYTES NFR BLD AUTO: 0.5 % (ref 0–0.5)
LYMPHOCYTES # BLD AUTO: 1.02 10*3/MM3 (ref 0.7–3.1)
LYMPHOCYTES NFR BLD AUTO: 27.6 % (ref 19.6–45.3)
MCH RBC QN AUTO: 30.5 PG (ref 26.6–33)
MCHC RBC AUTO-ENTMCNC: 33.3 G/DL (ref 31.5–35.7)
MCV RBC AUTO: 91.9 FL (ref 79–97)
MONOCYTES # BLD AUTO: 0.47 10*3/MM3 (ref 0.1–0.9)
MONOCYTES NFR BLD AUTO: 12.7 % (ref 5–12)
NEUTROPHILS NFR BLD AUTO: 2.06 10*3/MM3 (ref 1.7–7)
NEUTROPHILS NFR BLD AUTO: 55.7 % (ref 42.7–76)
NRBC BLD AUTO-RTO: 0 /100 WBC (ref 0–0.2)
PLATELET # BLD AUTO: 220 10*3/MM3 (ref 140–450)
PMV BLD AUTO: 9.1 FL (ref 6–12)
POTASSIUM SERPL-SCNC: 4.1 MMOL/L (ref 3.5–5.2)
PROT SERPL-MCNC: 6.2 G/DL (ref 6–8.5)
RBC # BLD AUTO: 4.42 10*6/MM3 (ref 4.14–5.8)
SODIUM SERPL-SCNC: 142 MMOL/L (ref 136–145)
WBC NRBC COR # BLD: 3.7 10*3/MM3 (ref 3.4–10.8)

## 2022-11-12 PROCEDURE — 80053 COMPREHEN METABOLIC PANEL: CPT | Performed by: EMERGENCY MEDICINE

## 2022-11-12 PROCEDURE — 85025 COMPLETE CBC W/AUTO DIFF WBC: CPT | Performed by: EMERGENCY MEDICINE

## 2022-11-12 PROCEDURE — 71045 X-RAY EXAM CHEST 1 VIEW: CPT

## 2022-11-12 PROCEDURE — 99283 EMERGENCY DEPT VISIT LOW MDM: CPT

## 2022-11-12 PROCEDURE — 36415 COLL VENOUS BLD VENIPUNCTURE: CPT

## 2022-11-12 RX ORDER — BENZONATATE 200 MG/1
200 CAPSULE ORAL 3 TIMES DAILY PRN
Qty: 21 CAPSULE | Refills: 0 | Status: SHIPPED | OUTPATIENT
Start: 2022-11-12 | End: 2022-12-16

## 2022-11-12 NOTE — ED PROVIDER NOTES
EMERGENCY DEPARTMENT ENCOUNTER  I wore full protective equipment throughout this patient encounter including a N95 mask, eye shield, gown and gloves. Hand hygiene was performed before donning protective equipment and after removal when leaving the room.    Room Number:  24/24  Date of encounter:  11/12/2022  PCP: Jyotsna Lord APRN    HPI:  Context: Danyel Dash is a 77 y.o. male who presents to the ED c/o chief complaint of COVID infection.  Patient reports that he tested positive for COVID-19 today, initially began feeling ill approximately 10 to 11 days ago while on a cruise ship.  Patient reports he had been vaccinated against COVID-19, denies any history of heart or lung disease but does report history of chronic kidney disease.  Patient reports that he was seen at urgent care and recommended to present to the emergency department for evaluation.  Patient reports that he has had cough, cough is nonproductive in nature.  Patient denies any chest pain or shortness of breath.  Patient denies any nausea vomiting, has been having diarrhea, 2 episodes of diarrhea today, no blood or mucus.  Patient denies any urinary symptoms.  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  Patient reports overall he feels that his symptoms are improving.    MEDICAL HISTORY REVIEW  Reviewed in EPIC    PAST MEDICAL HISTORY  Active Ambulatory Problems     Diagnosis Date Noted   • Anemia 06/12/2016   • Anxiety 06/12/2016   • Stage 3a chronic kidney disease (HCC) 06/12/2016   • Type 2 diabetes mellitus, without long-term current use of insulin (HCC) 06/12/2016   • Erectile dysfunction of nonorganic origin 06/12/2016   • Gastroesophageal reflux disease 06/12/2016   • Hyperlipidemia 06/12/2016   • Hypertension 06/12/2016   • Iron deficiency anemia 03/14/2018   • Special screening for malignant neoplasm of prostate 10/04/2018   • Diverticulosis 10/08/2021   •  Seasonal allergies 12/03/2021   • H/O: gout 01/21/2019   • Osteoarthritis of knee 04/24/2020   • CKD (chronic kidney disease) stage 2, GFR 60-89 ml/min 12/06/2021   • Renal lesion 12/06/2021   • Meniere's disease of both ears 04/11/2022   • History of shingles 07/27/2022     Resolved Ambulatory Problems     Diagnosis Date Noted   • Inguinal pain 06/12/2016   • Prostate disorder 10/06/2017   • Fever 04/04/2019   • Flu 04/04/2019   • Tear of medial meniscus of knee 11/27/2020   • Rheumatic fever 12/03/2021   • Renal failure 12/03/2021   • Diverticulitis of colon 12/03/2021   • Arthritis of knee 01/21/2019   • Acute calculous cholecystitis 12/06/2021   • Hospital discharge follow-up 12/16/2021   • Unspecified hypertensive kidney disease with chronic kidney disease stage I through stage IV, or unspecified 03/30/2022     Past Medical History:   Diagnosis Date   • Arthritis    • Chronic kidney disease    • Coronary artery disease    • Diabetes mellitus (HCC)    • GERD (gastroesophageal reflux disease)    • GI bleed    • Low back pain    • Peripheral neuropathy    • Spinal stenosis, lumbar        PAST SURGICAL HISTORY  Past Surgical History:   Procedure Laterality Date   • CATARACT EXTRACTION Bilateral    • CHOLECYSTECTOMY     • CHOLECYSTECTOMY WITH INTRAOPERATIVE CHOLANGIOGRAM N/A 12/06/2021    Procedure: CHOLECYSTECTOMY LAPAROSCOPIC;  Surgeon: Didi Telles MD;  Location[facility];  Service: General;  Laterality: N/A;   • COLONOSCOPY N/A 12/27/2016    tics, IH, polyps   • ENDOSCOPY N/A 12/27/2016    LA Grade B reflux esophagitis, erythematous mucosa in stomach   • ENDOSCOPY N/A 07/07/2017    Two jejunal AVM's    • ENTEROSCOPY SMALL BOWEL  07/07/2017    Procedure: ENTEROSCOPY SMALL BOWEL;  Surgeon: Anil Prakash MD;  Location: [facility] ENDOSCOPY;  Service:    • EPIDURAL BLOCK     • GALLBLADDER SURGERY N/A 02/2022   • KNEE ARTHROPLASTY, PARTIAL REPLACEMENT Left 02/01/2021   • KNEE ARTHROSCOPY Right    • SHOULDER  ARTHROSCOPY Right    • TONSILLECTOMY         FAMILY HISTORY  Family History   Problem Relation Age of Onset   • Diverticulosis Father    • Diverticulitis Father    • Alzheimer's disease Mother    • No Known Problems Sister        SOCIAL HISTORY  Social History     Socioeconomic History   • Marital status:      Spouse name: Lea   • Number of children: 3   Tobacco Use   • Smoking status: Former     Packs/day: 1.50     Years: 18.00     Pack years: 27.00     Types: Cigarettes     Quit date:      Years since quittin.8   • Smokeless tobacco: Former   • Tobacco comments:     quit    Vaping Use   • Vaping Use: Never used   Substance and Sexual Activity   • Alcohol use: No   • Drug use: No   • Sexual activity: Defer       ALLERGIES  Patient has no known allergies.    The patient's allergies have been reviewed    REVIEW OF SYSTEMS  All systems reviewed and negative except for those discussed in HPI.     PHYSICAL EXAM  I have reviewed the triage vital signs and nursing notes.  ED Triage Vitals [22 1553]   Temp Heart Rate Resp BP SpO2   97.1 °F (36.2 °C) 67 16 -- 95 %      Temp src Heart Rate Source Patient Position BP Location FiO2 (%)   Tympanic Monitor -- -- --       General: No acute distress, well-appearing, speaking full sentences, maintaining oxygen saturation on room air without difficulty  HENT: NCAT, PERRL, Nares patent  Eyes: no scleral icterus  Neck: trachea midline, no ROM limitations  CV: regular rhythm, regular rate  Respiratory: normal effort, CTAB, no wheezing rales or rhonchi  Abdomen: soft, nondistended, nontender to palpation, no rebound tenderness, no guarding or rigidity  : deferred  Musculoskeletal: no deformity  Neuro: alert, moves all extremities, follows commands  Skin: warm, dry, no peripheral edema    LAB RESULTS  Recent Results (from the past 24 hour(s))   Covid-19 + Flu A&B AG, Veritor [id number]    Collection Time: 22  3:12 PM    Specimen: Swab   Result Value Ref  Range    COVID19 Detected (A)     Influenza A Antigen BARBARA Not Detected     Influenza B Antigen BARBARA Not Detected     Internal Control Passed     Lot Number [id number]     Expiration Date 3/10/2023    Comprehensive Metabolic Panel    Collection Time: 11/12/22  4:48 PM    Specimen: Arm, Right; Blood   Result Value Ref Range    Glucose 110 (H) 65 - 99 mg/dL    BUN 15 8 - 23 mg/dL    Creatinine 1.03 0.76 - 1.27 mg/dL    Sodium 142 136 - 145 mmol/L    Potassium 4.1 3.5 - 5.2 mmol/L    Chloride 106 98 - 107 mmol/L    CO2 24.1 22.0 - 29.0 mmol/L    Calcium 8.9 8.6 - 10.5 mg/dL    Total Protein 6.2 6.0 - 8.5 g/dL    Albumin 3.50 3.50 - 5.20 g/dL    ALT (SGPT) 15 1 - 41 U/L    AST (SGOT) 18 1 - 40 U/L    Alkaline Phosphatase 52 39 - 117 U/L    Total Bilirubin 0.6 0.0 - 1.2 mg/dL    Globulin 2.7 gm/dL    A/G Ratio 1.3 g/dL    BUN/Creatinine Ratio 14.6 7.0 - 25.0    Anion Gap 11.9 5.0 - 15.0 mmol/L    eGFR 75.3 >60.0 mL/min/1.73   CBC Auto Differential    Collection Time: 11/12/22  4:48 PM    Specimen: Arm, Right; Blood   Result Value Ref Range    WBC 3.70 3.40 - 10.80 10*3/mm3    RBC 4.42 4.14 - 5.80 10*6/mm3    Hemoglobin 13.5 13.0 - 17.7 g/dL    Hematocrit 40.6 37.5 - 51.0 %    MCV 91.9 79.0 - 97.0 fL    MCH 30.5 26.6 - 33.0 pg    MCHC 33.3 31.5 - 35.7 g/dL    RDW 12.3 12.3 - 15.4 %    RDW-SD 42.0 37.0 - 54.0 fl    MPV 9.1 6.0 - 12.0 fL    Platelets 220 140 - 450 10*3/mm3    Neutrophil % 55.7 42.7 - 76.0 %    Lymphocyte % 27.6 19.6 - 45.3 %    Monocyte % 12.7 (H) 5.0 - 12.0 %    Eosinophil % 3.0 0.3 - 6.2 %    Basophil % 0.5 0.0 - 1.5 %    Immature Grans % 0.5 0.0 - 0.5 %    Neutrophils, Absolute 2.06 1.70 - 7.00 10*3/mm3    Lymphocytes, Absolute 1.02 0.70 - 3.10 10*3/mm3    Monocytes, Absolute 0.47 0.10 - 0.90 10*3/mm3    Eosinophils, Absolute 0.11 0.00 - 0.40 10*3/mm3    Basophils, Absolute 0.02 0.00 - 0.20 10*3/mm3    Immature Grans, Absolute 0.02 0.00 - 0.05 10*3/mm3    nRBC 0.0 0.0 - 0.2 /100 WBC       I ordered the  above labs and reviewed the results.    RADIOLOGY  XR Chest 1 View    Result Date: 11/12/2022  Portable chest radiograph  HISTORY:Shortness of air  TECHNIQUE: Single AP portable radiograph of the chest  COMPARISON:Chest radiograph 12/05/2021      FINDINGS AND IMPRESSION: No pulmonary consolidation, pleural effusion or pneumothorax is seen. Cardiac silhouette is within normal limits for size. Asymmetric elevation of the right hemidiaphragm is present.  This report was finalized on 11/12/2022 5:46 PM by Dr. Pablo Roque M.D.        I ordered the above noted radiological studies. I reviewed the images and results. I agree with the radiologist interpretation.    PROCEDURES  Procedures    MEDICATIONS GIVEN IN ER  Medications - No data to display    PROGRESS, DATA ANALYSIS, CONSULTS, AND MEDICAL DECISION MAKING  A complete history and physical exam have been performed.  All available laboratory and imaging results have been reviewed by myself prior to disposition.    MDM  After the initial H&P, I discussed pertinent information from history and physical exam with patient/family.  Discussed differential diagnosis.  Discussed plan for ED evaluation/workup/treatment.  All questions answered.  Patient/family is agreeable with plan.  ED Course as of 11/12/22 1757   Sat Nov 12, 2022   1720 Patient with known COVID infection, reports symptoms improving.  Patient reports he was sent to the emergency department as he is a high risk individual and possibility of treatment although given duration of symptoms, patient is not a candidate for treatment with Paxlovid or monoclonal antibody.  Obtaining screening lab work and chest x-ray.  Plan to ambulate patient to ensure that he does not having hypoxia but patient currently satting fine on room air and denies any dyspnea.  If ED work-up unremarkable, plan for discharge home with COVID precautions and return precautions.  Discussed this with patient, he is agreeable with plan. [JG]    1750 Chest x-ray is unremarkable, no signs of pneumonia.  Lab work unremarkable other than mild hyperglycemia just over normal.  Patient denies any dyspnea, satting fine on room air, patient has ambulate without difficulty and maintained a sat on room air.  Patient is well-appearing appears appropriate for discharge with outpatient follow-up.  Discussed need to quarantine, extensive discussion return precautions, discharging. [JG]      ED Course User Index  [JG] Salvatore Kaye MD       AS OF 17:57 EST VITALS:    BP - 179/84  HR - 58  TEMP - 97.1 °F (36.2 °C) (Tympanic)  O2 SATS - 97%    DIAGNOSIS  Final diagnoses:   COVID-19         DISPOSITION  DISCHARGE    Patient discharged in stable condition.    Reviewed implications of results, diagnosis, meds, responsibility to follow up, warning signs and symptoms of possible worsening, potential complications and reasons to return to ER.    Patient/Family voiced understanding of above instructions.    Discussed plan for discharge, as there is no emergent indication for admission. Patient referred to primary care provider for BP management due to today's BP. Pt/family is agreeable and understands need for follow up and repeat testing.  Pt is aware that discharge does not mean that nothing is wrong but it indicates no emergency is present that requires admission and they must continue care with follow-up as given below or physician of their choice.     FOLLOW-UP  Jyotsna Lord, JOHANA  [address] [address] [phone]    Schedule an appointment as soon as possible for a visit   as needed    [facility] Emergency Department  [address] [facility] [address] [phone]  Go to   as needed         Medication List      New Prescriptions    benzonatate 200 MG capsule  Commonly known as: TESSALON  Take 1 capsule by mouth 3 (Three) Times a Day As Needed for Cough.           Where to Get Your Medications      These  medications were sent to [facility] [id number] - [place], [place] - [address]  AT [address] - [phone]  - [phone] fx [address] , [facility] [address]    Phone: [phone]   · benzonatate 200 MG capsule          Salvatore Kaye MD  11/12/22 8035

## 2022-11-12 NOTE — ED TRIAGE NOTES
Pt reports he tested positive today for covid. Pt reports a cough and runny nose. Pt reports he was sent over for further evaluation from [facility].     Pt was wearing a mask during assessment.  This RN wore appropriate PPE

## 2022-11-14 DIAGNOSIS — K21.9 GASTROESOPHAGEAL REFLUX DISEASE: ICD-10-CM

## 2022-11-14 DIAGNOSIS — E78.01 FAMILIAL HYPERCHOLESTEROLEMIA: ICD-10-CM

## 2022-11-15 RX ORDER — OMEPRAZOLE 40 MG/1
CAPSULE, DELAYED RELEASE ORAL
Qty: 90 CAPSULE | Refills: 3 | OUTPATIENT
Start: 2022-11-15

## 2022-11-15 RX ORDER — SIMVASTATIN 20 MG
TABLET ORAL
Qty: 90 TABLET | Refills: 1 | OUTPATIENT
Start: 2022-11-15

## 2022-12-16 ENCOUNTER — OFFICE VISIT (OUTPATIENT)
Dept: FAMILY MEDICINE CLINIC | Facility: CLINIC | Age: 77
End: 2022-12-16

## 2022-12-16 VITALS
BODY MASS INDEX: 35.99 KG/M2 | HEART RATE: 69 BPM | TEMPERATURE: 96.9 F | HEIGHT: 69 IN | DIASTOLIC BLOOD PRESSURE: 58 MMHG | OXYGEN SATURATION: 98 % | SYSTOLIC BLOOD PRESSURE: 120 MMHG | WEIGHT: 243 LBS

## 2022-12-16 DIAGNOSIS — N18.31 STAGE 3A CHRONIC KIDNEY DISEASE: ICD-10-CM

## 2022-12-16 DIAGNOSIS — I10 PRIMARY HYPERTENSION: ICD-10-CM

## 2022-12-16 DIAGNOSIS — E78.01 FAMILIAL HYPERCHOLESTEROLEMIA: ICD-10-CM

## 2022-12-16 DIAGNOSIS — F32.1 CURRENT MODERATE EPISODE OF MAJOR DEPRESSIVE DISORDER, UNSPECIFIED WHETHER RECURRENT: ICD-10-CM

## 2022-12-16 DIAGNOSIS — R41.3 MEMORY DIFFICULTIES: ICD-10-CM

## 2022-12-16 DIAGNOSIS — F41.9 ANXIETY: ICD-10-CM

## 2022-12-16 DIAGNOSIS — E11.9 TYPE 2 DIABETES MELLITUS WITHOUT COMPLICATION, WITHOUT LONG-TERM CURRENT USE OF INSULIN: Primary | ICD-10-CM

## 2022-12-16 DIAGNOSIS — I10 ESSENTIAL HYPERTENSION: ICD-10-CM

## 2022-12-16 PROBLEM — N18.2 CKD (CHRONIC KIDNEY DISEASE) STAGE 2, GFR 60-89 ML/MIN: Status: RESOLVED | Noted: 2021-12-06 | Resolved: 2022-12-16

## 2022-12-16 LAB
EXPIRATION DATE: NORMAL
HBA1C MFR BLD: 7 %
Lab: NORMAL

## 2022-12-16 PROCEDURE — 99214 OFFICE O/P EST MOD 30 MIN: CPT | Performed by: STUDENT IN AN ORGANIZED HEALTH CARE EDUCATION/TRAINING PROGRAM

## 2022-12-16 PROCEDURE — 36416 COLLJ CAPILLARY BLOOD SPEC: CPT | Performed by: STUDENT IN AN ORGANIZED HEALTH CARE EDUCATION/TRAINING PROGRAM

## 2022-12-16 PROCEDURE — 3051F HG A1C>EQUAL 7.0%<8.0%: CPT | Performed by: STUDENT IN AN ORGANIZED HEALTH CARE EDUCATION/TRAINING PROGRAM

## 2022-12-16 PROCEDURE — 83036 HEMOGLOBIN GLYCOSYLATED A1C: CPT | Performed by: STUDENT IN AN ORGANIZED HEALTH CARE EDUCATION/TRAINING PROGRAM

## 2022-12-16 RX ORDER — PIOGLITAZONEHYDROCHLORIDE 45 MG/1
45 TABLET ORAL DAILY
Qty: 90 TABLET | Refills: 3 | Status: SHIPPED | OUTPATIENT
Start: 2022-12-16 | End: 2023-03-13 | Stop reason: SDUPTHER

## 2022-12-16 RX ORDER — VALACYCLOVIR HYDROCHLORIDE 500 MG/1
500 TABLET, FILM COATED ORAL DAILY
Qty: 30 TABLET | Refills: 2 | Status: SHIPPED | OUTPATIENT
Start: 2022-12-16 | End: 2023-03-13 | Stop reason: SDUPTHER

## 2022-12-16 RX ORDER — HYDROXYZINE HYDROCHLORIDE 25 MG/1
25 TABLET, FILM COATED ORAL 3 TIMES DAILY PRN
Qty: 90 TABLET | Refills: 3 | Status: SHIPPED | OUTPATIENT
Start: 2022-12-16 | End: 2023-03-13 | Stop reason: SDUPTHER

## 2022-12-16 RX ORDER — AMLODIPINE BESYLATE 10 MG/1
10 TABLET ORAL DAILY
Qty: 90 TABLET | Refills: 3 | Status: SHIPPED | OUTPATIENT
Start: 2022-12-16 | End: 2023-03-13 | Stop reason: SDUPTHER

## 2022-12-16 RX ORDER — PANTOPRAZOLE SODIUM 40 MG/1
40 TABLET, DELAYED RELEASE ORAL DAILY
Qty: 90 TABLET | Refills: 3 | Status: SHIPPED | OUTPATIENT
Start: 2022-12-16 | End: 2023-03-13 | Stop reason: SDUPTHER

## 2022-12-16 RX ORDER — SIMVASTATIN 20 MG
20 TABLET ORAL DAILY
Qty: 90 TABLET | Refills: 3 | Status: SHIPPED | OUTPATIENT
Start: 2022-12-16 | End: 2023-03-13 | Stop reason: SDUPTHER

## 2022-12-16 RX ORDER — BUPROPION HYDROCHLORIDE 300 MG/1
300 TABLET ORAL DAILY
Qty: 90 TABLET | Refills: 3 | Status: SHIPPED | OUTPATIENT
Start: 2022-12-16 | End: 2023-03-13 | Stop reason: SDUPTHER

## 2022-12-16 RX ORDER — LISINOPRIL 10 MG/1
10 TABLET ORAL DAILY
Qty: 90 TABLET | Refills: 3 | Status: SHIPPED | OUTPATIENT
Start: 2022-12-16 | End: 2023-03-13 | Stop reason: SDUPTHER

## 2022-12-16 RX ORDER — ESCITALOPRAM OXALATE 10 MG/1
10 TABLET ORAL EVERY MORNING
Qty: 30 TABLET | Refills: 1 | Status: SHIPPED | OUTPATIENT
Start: 2022-12-16 | End: 2023-01-24 | Stop reason: SDUPTHER

## 2022-12-16 NOTE — ASSESSMENT & PLAN NOTE
Well-controlled today in the office with BP of 120/58.  Goal less than 130/80 given diabetes.  Continue lisinopril 10 mg daily and amlodipine 10 mg daily.

## 2022-12-16 NOTE — ASSESSMENT & PLAN NOTE
Last A1c was 6.7%.  Today A1c is 7%.  Patient has been out of Actos for the last few weeks which likely contributed to slightly increased A1c today.  Goal A1c less than 7%  He is on ACE inhibitor and statin  Recommend yearly eye exams and daily foot exams.  Continue Actos 45 mg daily.

## 2022-12-16 NOTE — ASSESSMENT & PLAN NOTE
Well-controlled on simvastatin 20 mg daily  Repeat lipid panel yearly with annual.  Continue current medications.

## 2022-12-16 NOTE — ASSESSMENT & PLAN NOTE
Anxiety well controlled with Wellbutrin daily and hydroxyzine as needed.  He is currently having slightly increased symptoms of depression, manifesting as increased agitation and inability to focus.  Will add Lexapro 10 mg daily to Wellbutrin 300 mg daily.

## 2022-12-16 NOTE — PROGRESS NOTES
"Chief Complaint  Establish Care (Refills on all meds /-no recent labs (pt fasting)) and Diabetes (A1c check )    Subjective        Danyel Dash presents to [facility]  History of Present Illness  77-year-old male with type 2 diabetes, CKD 3, depression/anxiety, hyperlipidemia, hypertension who presents to establish care.    Patient has been out of his medications for a few weeks and is concerned his A1c might be elevated due to this.  Otherwise he has 2 major concerns in addition to chronic medical problems which were addressed:    1.  Short-term memory difficulty -notices issues with directions and getting easily mixed up with certain things.  He does have hearing problems which he states contributes.  He has difficulty focusing which also contributes.  His mother had early onset Alzheimer's disease in her 70s.  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  He does not drink alcohol.  He does note having untreated sleep apnea and recently uncontrolled depression.    2.  Depression/anxiety -was put on Wellbutrin when he stopped smoking a few years ago.  This was primarily for anxiety which worsened after he stopped smoking.  He is also taking hydroxyzine as needed which he states typically calms his nerves.  His adult daughter moved back in with them 3 years ago and has been dealing with long COVID symptoms which has been stressful.  His wife states that he has been more easily agitated recently which he agrees with.  No issues with insomnia.      Objective   Vital Signs:  /58 (BP Location: Right arm, Patient Position: Sitting, Cuff Size: Adult)   Pulse 69   Temp 96.9 °F (36.1 °C) (Infrared)   Ht 175.3 cm (69\")   Wt 110 kg (243 lb)   SpO2 98%   BMI 35.88 kg/m²   Estimated body mass index is 35.88 kg/m² as calculated from the following:    Height as of this encounter: 175.3 cm (69\").    Weight as of this encounter: 110 kg (243 lb).    Class 2 Severe Obesity (BMI " >=35 and <=39.9). Obesity-related health conditions include the following: hypertension, diabetes mellitus and dyslipidemias. Obesity is unchanged. BMI is is above average; BMI management plan is completed. We discussed portion control and increasing exercise.      Physical Exam  Constitutional:       General: He is not in acute distress.  Eyes:      Conjunctiva/sclera: Conjunctivae normal.   Cardiovascular:      Rate and Rhythm: Normal rate and regular rhythm.   Pulmonary:      Effort: Pulmonary effort is normal. No respiratory distress.      Breath sounds: Normal breath sounds.   Skin:     General: Skin is warm and dry.   Neurological:      Mental Status: He is alert and oriented to person, place, and time.   Psychiatric:         Mood and Affect: Mood normal.         Behavior: Behavior normal.        Result Review :  The following data was reviewed by: Agatha Charlton MD on 12/16/2022:  Common labs    Common Labs 7/27/22 7/27/22 7/27/22 7/27/22 11/12/22 11/12/22 12/16/22    1335 1335 1335 1335 1648 1648    Glucose   175 (A)   110 (A)    BUN   17   15    Creatinine   1.15   1.03    Sodium   139   142    Potassium   4.7   4.1    Chloride   103   106    Calcium   9.4   8.9    Total Protein   6.2       Albumin   4.0   3.50    Total Bilirubin   0.7   0.6    Alkaline Phosphatase   55   52    AST (SGOT)   12   18    ALT (SGPT)   10   15    WBC  5.4   3.70     Hemoglobin  14.4   13.5     Hematocrit  43.9   40.6     Platelets  324   220     Hemoglobin A1C 6.7 (A)      7.0   Microalbumin, Urine    5.3      (A) Abnormal value       Comments are available for some flowsheets but are not being displayed.           Data reviewed: none          Assessment and Plan   Diagnoses and all orders for this visit:    1. Type 2 diabetes mellitus without complication, without long-term current use of insulin (HCC) (Primary)  Assessment & Plan:  Last A1c was 6.7%.  Today A1c is 7%.  Patient has been out of Actos for the last few weeks  which likely contributed to slightly increased A1c today.  Goal A1c less than 7%  He is on ACE inhibitor and statin  Recommend yearly eye exams and daily foot exams.  Continue Actos 45 mg daily.    Orders:  -     POC Glycosylated Hemoglobin (Hb A1C)  -     pioglitazone (ACTOS) 45 MG tablet; Take 1 tablet by mouth Daily.  Dispense: 90 tablet; Refill: 3    2. Essential hypertension  -     lisinopril (PRINIVIL,ZESTRIL) 10 MG tablet; Take 1 tablet by mouth Daily.  Dispense: 90 tablet; Refill: 3  -     amLODIPine (NORVASC) 10 MG tablet; Take 1 tablet by mouth Daily.  Dispense: 90 tablet; Refill: 3    3. Anxiety  Assessment & Plan:  Anxiety well controlled with Wellbutrin daily and hydroxyzine as needed.  He is currently having slightly increased symptoms of depression, manifesting as increased agitation and inability to focus.  Will add Lexapro 10 mg daily to Wellbutrin 300 mg daily.    Orders:  -     buPROPion XL (WELLBUTRIN XL) 300 MG 24 hr tablet; Take 1 tablet by mouth Daily.  Dispense: 90 tablet; Refill: 3  -     hydrOXYzine (ATARAX) 25 MG tablet; Take 1 tablet by mouth 3 (Three) Times a Day As Needed for Anxiety. for anxiety  Dispense: 90 tablet; Refill: 3    4. Familial hypercholesterolemia  Assessment & Plan:  Well-controlled on simvastatin 20 mg daily  Repeat lipid panel yearly with annual.  Continue current medications.    Orders:  -     simvastatin (ZOCOR) 20 MG tablet; Take 1 tablet by mouth Daily.  Dispense: 90 tablet; Refill: 3    5. Current moderate episode of major depressive disorder, unspecified whether recurrent (HCC)  -     hydrOXYzine (ATARAX) 25 MG tablet; Take 1 tablet by mouth 3 (Three) Times a Day As Needed for Anxiety. for anxiety  Dispense: 90 tablet; Refill: 3  -     escitalopram (Lexapro) 10 MG tablet; Take 1 tablet by mouth Every Morning.  Dispense: 30 tablet; Refill: 1    6. Primary hypertension  Assessment & Plan:  Well-controlled today in the office with BP of 120/58.  Goal less than  130/80 given diabetes.  Continue lisinopril 10 mg daily and amlodipine 10 mg daily.      7. Stage 3a chronic kidney disease (HCC)  Assessment & Plan:  Stable.  Baseline serum creatinine around 1-1.2.   With microalbuminemia in July 2022.  Patient is on ACE inhibitor.   Follows with nephrology yearly.  Avoid NSAIDs and dehydration.  Continue ACE inhibitor.      8. Memory difficulties  Assessment & Plan:  Differential diagnosis includes Alzheimer's disease (FHx in mother), small vessel disease, uncontrolled depression or sleep apnea.  We discussed multiple options for work-up including neuropsychological testing and sleep study.  He would like to start by treating uncontrolled depression --started Lexapro 10 mg daily in addition to Wellbutrin today.  I counseled him on a healthy diet and regular exercise to prevent worsening of small vessel disease.  Hypertension and diabetes is well controlled.  He likely has sleep apnea but declines sleep study today.  He will let me know if he would like this ordered in the future.      Other orders  -     pantoprazole (PROTONIX) 40 MG EC tablet; Take 1 tablet by mouth Daily.  Dispense: 90 tablet; Refill: 3  -     valACYclovir (VALTREX) 500 MG tablet; Take 1 tablet by mouth Daily.  Dispense: 30 tablet; Refill: 2         Follow Up   Return in about 9 months (around 9/18/2023) for Medicare Wellness.  Patient was given instructions and counseling regarding his condition or for health maintenance advice. Please see specific information pulled into the AVS if appropriate.

## 2022-12-16 NOTE — ASSESSMENT & PLAN NOTE
Differential diagnosis includes Alzheimer's disease (FHx in mother), small vessel disease, uncontrolled depression or sleep apnea.  We discussed multiple options for work-up including neuropsychological testing and sleep study.  He would like to start by treating uncontrolled depression --started Lexapro 10 mg daily in addition to Wellbutrin today.  I counseled him on a healthy diet and regular exercise to prevent worsening of small vessel disease.  Hypertension and diabetes is well controlled.  He likely has sleep apnea but declines sleep study today.  He will let me know if he would like this ordered in the future.

## 2022-12-16 NOTE — ASSESSMENT & PLAN NOTE
Stable.  Baseline serum creatinine around 1-1.2.   With microalbuminemia in July 2022.  Patient is on ACE inhibitor.   Follows with nephrology yearly.  Avoid NSAIDs and dehydration.  Continue ACE inhibitor.

## 2023-01-09 ENCOUNTER — TELEPHONE (OUTPATIENT)
Dept: ORTHOPEDIC SURGERY | Facility: CLINIC | Age: 78
End: 2023-01-09
Payer: MEDICARE

## 2023-01-09 NOTE — TELEPHONE ENCOUNTER
Caller:EARLENE WATERMAN     Relationship to patient: WIFE     Best call back number: [phone]    Chief complaint: LOWER BACK     Type of visit: EPIDURAL     Requested date: ASAP

## 2023-01-24 DIAGNOSIS — F32.1 CURRENT MODERATE EPISODE OF MAJOR DEPRESSIVE DISORDER, UNSPECIFIED WHETHER RECURRENT: ICD-10-CM

## 2023-01-24 RX ORDER — ESCITALOPRAM OXALATE 10 MG/1
10 TABLET ORAL EVERY MORNING
Qty: 30 TABLET | Refills: 1 | Status: SHIPPED | OUTPATIENT
Start: 2023-01-24 | End: 2023-03-13 | Stop reason: SDUPTHER

## 2023-01-24 NOTE — TELEPHONE ENCOUNTER
Caller: Danyel Dash    Relationship: Self    Best call back number: [phone]    Requested Prescriptions:   Requested Prescriptions     Pending Prescriptions Disp Refills   • escitalopram (Lexapro) 10 MG tablet 30 tablet 1     Sig: Take 1 tablet by mouth Every Morning.        Pharmacy where request should be sent: [id number] - [facility] - [address]  AT [place] [address] & [facility] [phone] [facility] [phone]      Additional details provided by patient: PATIENT CALLING STATING THAT HE HAVING TO SWITCH PHARMACY DUE TO INSURANCE. HE IS NEEDING A NEW PRESCRIPTION SENT TO THEM    Does the patient have less than a 3 day supply:  [] Yes  [x] No    Would you like a call back once the refill request has been completed: [x] Yes [] No    If the office needs to give you a call back, can they leave a voicemail: [x] Yes [] No    Alexis Sutton Rep   01/24/23 11:56 EST

## 2023-02-06 ENCOUNTER — OFFICE VISIT (OUTPATIENT)
Dept: ORTHOPEDIC SURGERY | Facility: CLINIC | Age: 78
End: 2023-02-06
Payer: MEDICARE

## 2023-02-06 VITALS — WEIGHT: 247 LBS | HEIGHT: 69 IN | TEMPERATURE: 98 F | BODY MASS INDEX: 36.58 KG/M2

## 2023-02-06 DIAGNOSIS — M48.061 SPINAL STENOSIS OF LUMBAR REGION WITHOUT NEUROGENIC CLAUDICATION: ICD-10-CM

## 2023-02-06 DIAGNOSIS — M70.61 GREATER TROCHANTERIC BURSITIS OF RIGHT HIP: Primary | ICD-10-CM

## 2023-02-06 DIAGNOSIS — M54.16 LUMBAR RADICULOPATHY: ICD-10-CM

## 2023-02-06 DIAGNOSIS — R52 PAIN: ICD-10-CM

## 2023-02-06 DIAGNOSIS — M25.551 RIGHT HIP PAIN: ICD-10-CM

## 2023-02-06 PROCEDURE — 99213 OFFICE O/P EST LOW 20 MIN: CPT | Performed by: NURSE PRACTITIONER

## 2023-02-06 PROCEDURE — 72100 X-RAY EXAM L-S SPINE 2/3 VWS: CPT | Performed by: NURSE PRACTITIONER

## 2023-02-06 NOTE — PROGRESS NOTES
Patient Name: Danyel Dash   YOB: 1945  Referring Primary Care Physician: Agatha Charlton MD      Chief Complaint:    Chief Complaint   Patient presents with   • Lumbar Spine - Pain, Initial Evaluation        HPI:  Danyel Dash is a 77 y.o. male who presents to [facility] for evaluation of right thumb low back pain referring into the hip and lateral thigh terminating above the knee. This is an established patient the practice to previously saw Dr. Ya in [place] for similar complaints in 2021.  He was referred for epidural injections and physical therapy.  He does well with injections but says therapy offered him no relief.  The primary focus of therapy was his right hip.  Pain refers into the right groin at times.  He does not particularly have any trouble taking stairs.  He says when the hip pain is flared up he has to sit for relief and then can resume activity several minutes later.  Prior pertinent records were reviewed.    PFSH:  See attached    ROS: As per HPI, otherwise negative    Objective:    Vitals:    02/06/23 1522   Temp: 98 °F (36.7 °C)     Body mass index is 36.48 kg/m².      Physical Exam  Constitutional:       Appearance: He is well-developed and well-nourished.   Eyes:      General: No scleral icterus.  Skin:     General: Skin is intact.   Neurological:      Mental Status: He is alert.      Gait: Gait is intact.   Psychiatric:         Mood and Affect: Mood and affect normal.       Spine Musculoskeletal Exam    Gait    Gait is normal.    Inspection    Thoracolumbar    Edema (right lower extremity): none    Edema (left lower extremity): none    Palpation    Thoracolumbar    Tenderness: present      Paraspinous: right      Greater trochanter: right    Right      Muscle tone: normal    Left      Muscle tone: normal    Strength    Thoracolumbar    Thoracolumbar motor exam is normal.     Sensory    Thoracolumbar    Thoracolumbar sensation is  normal.    Reflexes    Right      Quadriceps: 0/4      Achilles: 0/4     Left      Quadriceps: 0/4      Achilles: 0/4    Special Tests    Thoracolumbar      Right      ALVERTO test: negative      SLR: no back or leg pain      Left      SLR: no back or leg pain    General      Constitutional: well-developed and well-nourished    Scleral icterus: no    Labored breathing: no    Psychiatric: normal mood and affect and no acute distress    Neurological: alert and oriented x3    Skin: intact        IMAGING:     Indication: pain related symptoms,  Views: 2V AP&LAT lumbar  Findings: Personally reviewed and reveals maintenance of the normal lordosis, mild degenerative disc and facet changes primarily in the lumbar spine, mild retrolisthesis L3 on L4 and L4 on L5  Comparison views: No significant change from 11/12/2021    Assessment:           Diagnoses and all orders for this visit:    1. Greater trochanteric bursitis of right hip (Primary)  -     Ambulatory Referral to Pain Management    2. Pain  -     XR Spine Lumbar 2 or 3 View    3. Right hip pain  -     Ambulatory Referral to Pain Management    4. Spinal stenosis of lumbar region without neurogenic claudication  -     Ambulatory Referral to Pain Management    5. Lumbar radiculopathy  -     Ambulatory Referral to Pain Management             Plan:  When he was last seen by Dr. Ya, Dr. Sandhu felt that the primary pain generator was the hip.  He also saw Dr Grissom with hip pain and was referred to physical therapy which did not offer much relief.  He never returned for hip injection.  At this point he may have overlap in symptoms from lumbar radiculopathy and primary hip pathology.  He does get pain referring to the groin which often leans more towards the hip.  We will refer him to pain management to continue his injections.  He has been getting epidurals at the hospital, but does have some complaint of inconsistency with providers.  We discussed trying pain  management at [place] where he can get epidurals as well as GTB injections and there will be more continuity.  He is agreeable to this plan.  He would like to avoid surgery at all cost.  He we will follow-up on an as-needed basis.  If pain becomes more life limiting, we will either update lumbar MRI or refer back to Dr Grissom to discuss the hip.    Return if symptoms worsen or fail to improve.

## 2023-03-03 NOTE — PROGRESS NOTES
"Chief Complaint   Patient presents with   • Follow-up     from capsule study        History of Present Illness: We discussed the monthly CBCs that he has been having.  His most recent CBC done last month showed a hemoglobin that had dropped slightly to 13.1.  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  It appears that there were multiple bleeding sites in the proximal small bowel.  Bleeding began at 10:07, less than 1 minute into the duodenum.  Should be accessible to treat endoscopically.  Second area of apparent bleeding noted at 25:46.  The capsule reached the cecum.  The TI was visualized.\"  We also reviewed the results of his 12/2016 EGD and colonoscopy. He feels good. He did increase the iron gluconate pills and vitamin C to TID x 10 days and he is now on BID iron. His stool is black while on iron. No rectal bleeding. No diarrhea, he is constipated. No abdominal or chest pain. No SOA, no dizziness or  Lightheadedness. No heartburn.    Past Medical History:   Diagnosis Date   • Anemia    • Chronic kidney disease    • Coronary artery disease     rheumatic fever at age 7   • Diabetes mellitus    • Hypertension    • Low back pain        Past Surgical History:   Procedure Laterality Date   • COLONOSCOPY N/A 12/27/2016    tics, IH, polyps   • ENDOSCOPY N/A 12/27/2016    LA Grade B reflux esophagitis, erythematous mucosa in stomach   • KNEE ARTHROSCOPY Right    • SHOULDER ARTHROSCOPY Right    • TONSILLECTOMY           Current Outpatient Prescriptions:   •  amLODIPine (NORVASC) 10 MG tablet, Take 1 tablet by mouth Daily., Disp: 90 tablet, Rfl: 1  •  aspirin 81 MG EC tablet, Take 81 mg by mouth Daily., Disp: , Rfl:   •  B Complex Vitamins (VITAMIN B COMPLEX) tablet, Take 1 tablet by mouth Daily., Disp: , Rfl:   •  Blood Gluc Meter Disp-Strips device, Pt to monitor blood glucose two times daily, Disp: 1 each, Rfl: 0  •  buPROPion XL (WELLBUTRIN XL) " Activity as tolerated. Can transfer on right leg with knee immobilizer on.    300 MG 24 hr tablet, Take 1 tablet by mouth Daily., Disp: 90 tablet, Rfl: 1  •  Cholecalciferol (VITAMIN D-3) 1000 UNITS capsule, Take 1,000 Units by mouth Daily., Disp: , Rfl:   •  fluticasone (FLONASE ALLERGY RELIEF) 50 MCG/ACT nasal spray, 2 sprays into each nostril Daily., Disp: , Rfl:   •  Iron, Ferrous Gluconate, 256 (28 FE) MG tablet, Take 1 tablet by mouth daily., Disp: , Rfl:   •  lisinopril (PRINIVIL,ZESTRIL) 10 MG tablet, Take 1 tablet by mouth Daily., Disp: 90 tablet, Rfl: 1  •  omeprazole (priLOSEC) 20 MG capsule, Take 1 capsule by mouth Daily., Disp: 90 capsule, Rfl: 1  •  pioglitazone (ACTOS) 45 MG tablet, Take 1 tablet by mouth Daily., Disp: 90 tablet, Rfl: 1  •  tadalafil (CIALIS) 20 MG tablet, Take 1 tablet by mouth Daily As Needed for erectile dysfunction., Disp: 90 tablet, Rfl: 1    No Known Allergies    Family History   Problem Relation Age of Onset   • Diverticulosis Father        Social History     Social History   • Marital status:      Spouse name: N/A   • Number of children: N/A   • Years of education: N/A     Occupational History   • Not on file.     Social History Main Topics   • Smoking status: Former Smoker   • Smokeless tobacco: Not on file   • Alcohol use No   • Drug use: No   • Sexual activity: Not on file     Other Topics Concern   • Not on file     Social History Narrative       Review of Systems   All other systems reviewed and are negative.      Vitals:    06/15/17 0819   BP: 128/76   Temp: 98.3 °F (36.8 °C)       Physical Exam   Constitutional: He is oriented to person, place, and time. He appears well-developed and well-nourished. No distress.   HENT:   Head: Normocephalic and atraumatic. Hair is normal.   Right Ear: Hearing, tympanic membrane, external ear and ear canal normal.   Left Ear: Hearing, tympanic membrane, external ear and ear canal normal.   Nose: No sinus tenderness or nasal deformity.   Mouth/Throat: Uvula is midline, oropharynx is clear and moist and  mucous membranes are normal. No oral lesions. No uvula swelling.   Eyes: Conjunctivae, EOM and lids are normal. Pupils are equal, round, and reactive to light. Right eye exhibits no discharge. Left eye exhibits no discharge. No scleral icterus. Right eye exhibits normal extraocular motion and no nystagmus. Left eye exhibits normal extraocular motion and no nystagmus.   Fundoscopic exam:       The right eye shows red reflex.        The left eye shows red reflex.   Neck: Normal range of motion. Neck supple. No JVD present. No tracheal deviation present. No thyromegaly present.   Cardiovascular: Normal rate, regular rhythm, normal heart sounds, intact distal pulses and normal pulses.  Exam reveals no gallop.    No murmur heard.  Pulmonary/Chest: Effort normal and breath sounds normal. No respiratory distress. He has no wheezes. He has no rales. He exhibits no tenderness.   Abdominal: Soft. Bowel sounds are normal. He exhibits no distension and no mass. There is no tenderness. There is no guarding. No hernia.   Genitourinary: Rectum normal and prostate normal.   Musculoskeletal: Normal range of motion. He exhibits no edema, tenderness or deformity.   Lymphadenopathy:     He has no cervical adenopathy.   Neurological: He is alert and oriented to person, place, and time. He has normal reflexes. He displays normal reflexes. No cranial nerve deficit. He exhibits normal muscle tone. Coordination normal.   Skin: Skin is warm and dry. No rash noted. He is not diaphoretic.   Psychiatric: He has a normal mood and affect. His behavior is normal. Judgment and thought content normal.   Nursing note and vitals reviewed.      Danyel was seen today for follow-up.    Diagnoses and all orders for this visit:    Iron deficiency anemia due to chronic blood loss  -     Case Request; Standing  -     sodium chloride 0.9 % flush 1-10 mL; Infuse 1-10 mL into a venous catheter As Needed for Line Care.  -     Case Request  -     CBC &  Differential  -     Comprehensive Metabolic Panel  -     Iron and TIBC  -     Ferritin    DM (diabetes mellitus screen)  -     CBC & Differential  -     Comprehensive Metabolic Panel  -     Iron and TIBC  -     Ferritin    CRI (chronic renal insufficiency), unspecified stage  -     CBC & Differential  -     Comprehensive Metabolic Panel  -     Iron and TIBC  -     Ferritin    Occult GI bleeding    Other orders  -     Implement Anesthesia orders day of procedure.; Standing  -     Obtain informed consent; Standing  -     Insert Peripheral IV; Standing  -     Saline Lock & Maintain IV Access; Standing       Assessment:  1) GI bleeding of obscure etiology.  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  2) Heme positive stool  3) Iron deficiency anemia  4) GERD     REcommendations:  1) I would recommend that we do an EGD and a small bowel enteroscopy to try to burn the bleeding arteriovenous malformations in the proximal small bowel noted on capsule endoscopy.  2) CBC  3) Increase the iron pills to TID  4) Continue getting CBC's monthly.      No Follow-up on file.

## 2023-03-13 ENCOUNTER — TELEPHONE (OUTPATIENT)
Dept: FAMILY MEDICINE CLINIC | Facility: CLINIC | Age: 78
End: 2023-03-13
Payer: MEDICARE

## 2023-03-13 DIAGNOSIS — F32.1 CURRENT MODERATE EPISODE OF MAJOR DEPRESSIVE DISORDER, UNSPECIFIED WHETHER RECURRENT: ICD-10-CM

## 2023-03-13 DIAGNOSIS — E78.01 FAMILIAL HYPERCHOLESTEROLEMIA: ICD-10-CM

## 2023-03-13 DIAGNOSIS — E11.9 TYPE 2 DIABETES MELLITUS WITHOUT COMPLICATION, WITHOUT LONG-TERM CURRENT USE OF INSULIN: ICD-10-CM

## 2023-03-13 DIAGNOSIS — J01.00 ACUTE MAXILLARY SINUSITIS, RECURRENCE NOT SPECIFIED: ICD-10-CM

## 2023-03-13 DIAGNOSIS — I10 ESSENTIAL HYPERTENSION: ICD-10-CM

## 2023-03-13 DIAGNOSIS — E11.9 TYPE 2 DIABETES MELLITUS WITHOUT COMPLICATION: ICD-10-CM

## 2023-03-13 DIAGNOSIS — F41.9 ANXIETY: ICD-10-CM

## 2023-03-13 RX ORDER — VALACYCLOVIR HYDROCHLORIDE 500 MG/1
500 TABLET, FILM COATED ORAL DAILY
Qty: 30 TABLET | Refills: 2 | Status: SHIPPED | OUTPATIENT
Start: 2023-03-13

## 2023-03-13 RX ORDER — SIMVASTATIN 20 MG
20 TABLET ORAL DAILY
Qty: 90 TABLET | Refills: 3 | Status: SHIPPED | OUTPATIENT
Start: 2023-03-13

## 2023-03-13 RX ORDER — BUPROPION HYDROCHLORIDE 300 MG/1
300 TABLET ORAL DAILY
Qty: 90 TABLET | Refills: 3 | Status: SHIPPED | OUTPATIENT
Start: 2023-03-13

## 2023-03-13 RX ORDER — ESCITALOPRAM OXALATE 10 MG/1
10 TABLET ORAL EVERY MORNING
Qty: 90 TABLET | Refills: 3 | Status: SHIPPED | OUTPATIENT
Start: 2023-03-13

## 2023-03-13 RX ORDER — LISINOPRIL 10 MG/1
10 TABLET ORAL DAILY
Qty: 90 TABLET | Refills: 3 | Status: SHIPPED | OUTPATIENT
Start: 2023-03-13

## 2023-03-13 RX ORDER — PANTOPRAZOLE SODIUM 40 MG/1
40 TABLET, DELAYED RELEASE ORAL DAILY
Qty: 90 TABLET | Refills: 3 | Status: SHIPPED | OUTPATIENT
Start: 2023-03-13

## 2023-03-13 RX ORDER — HYDROXYZINE HYDROCHLORIDE 25 MG/1
25 TABLET, FILM COATED ORAL 3 TIMES DAILY PRN
Qty: 90 TABLET | Refills: 3 | Status: SHIPPED | OUTPATIENT
Start: 2023-03-13

## 2023-03-13 RX ORDER — PIOGLITAZONEHYDROCHLORIDE 45 MG/1
45 TABLET ORAL DAILY
Qty: 90 TABLET | Refills: 3 | Status: SHIPPED | OUTPATIENT
Start: 2023-03-13

## 2023-03-13 RX ORDER — AMLODIPINE BESYLATE 10 MG/1
10 TABLET ORAL DAILY
Qty: 90 TABLET | Refills: 3 | Status: SHIPPED | OUTPATIENT
Start: 2023-03-13

## 2023-03-13 RX ORDER — FLUTICASONE PROPIONATE 50 MCG
2 SPRAY, SUSPENSION (ML) NASAL DAILY
Qty: 15.8 ML | Refills: 3 | Status: SHIPPED | OUTPATIENT
Start: 2023-03-13

## 2023-03-13 NOTE — TELEPHONE ENCOUNTER
"  Caller: Danyel Dash \"Beto\"    Relationship: Self    Best call back number:[phone]    Do you require a callback: YES-INSURANCE IS REQUIRING ALL RX BE SENT IN TO [facility]. THEY NEED NEW RX'S FOR EVERYTHING - THEY CAN'T BE TRANSFERRED.       THE ONLY ONE HE NEEDS URGENTLY IS hydrOXYzine (ATARAX) 25 MG tablet    BUT THEY ALL NEED TO BE MOVED TO PHARMACY BELOW.     [id number] [address] [place]  AT [facility] [address] - [phone]  - [phone]   [phone]      "

## 2023-03-13 NOTE — TELEPHONE ENCOUNTER
Rx Refill Note  Requested Prescriptions      No prescriptions requested or ordered in this encounter      Last office visit with prescribing clinician: 12/16/2022   Last telemedicine visit with prescribing clinician: Visit date not found   Next office visit with prescribing clinician: 9/19/2023                         Would you like a call back once the refill request has been completed: [] Yes [] No    If the office needs to give you a call back, can they leave a voicemail: [] Yes [] No    Rima Mujica MA/LISA  03/13/23, 12:38 EDT

## 2023-03-14 ENCOUNTER — OFFICE VISIT (OUTPATIENT)
Dept: PAIN MEDICINE | Facility: CLINIC | Age: 78
End: 2023-03-14
Payer: MEDICARE

## 2023-03-14 ENCOUNTER — PREP FOR SURGERY (OUTPATIENT)
Dept: SURGERY | Facility: SURGERY CENTER | Age: 78
End: 2023-03-14
Payer: MEDICARE

## 2023-03-14 VITALS
HEIGHT: 69 IN | RESPIRATION RATE: 18 BRPM | BODY MASS INDEX: 37.65 KG/M2 | HEART RATE: 72 BPM | SYSTOLIC BLOOD PRESSURE: 143 MMHG | OXYGEN SATURATION: 97 % | TEMPERATURE: 98.4 F | WEIGHT: 254.2 LBS | DIASTOLIC BLOOD PRESSURE: 60 MMHG

## 2023-03-14 DIAGNOSIS — M51.36 DDD (DEGENERATIVE DISC DISEASE), LUMBAR: Primary | ICD-10-CM

## 2023-03-14 DIAGNOSIS — M70.61 TROCHANTERIC BURSITIS OF RIGHT HIP: ICD-10-CM

## 2023-03-14 DIAGNOSIS — M47.816 LUMBAR FACET ARTHROPATHY: ICD-10-CM

## 2023-03-14 DIAGNOSIS — M70.61 GREATER TROCHANTERIC BURSITIS OF RIGHT HIP: ICD-10-CM

## 2023-03-14 DIAGNOSIS — M54.16 LUMBAR RADICULOPATHY: Primary | ICD-10-CM

## 2023-03-14 DIAGNOSIS — M54.16 LUMBAR RADICULOPATHY: ICD-10-CM

## 2023-03-14 PROBLEM — M51.369 DDD (DEGENERATIVE DISC DISEASE), LUMBAR: Status: ACTIVE | Noted: 2023-03-14

## 2023-03-14 PROCEDURE — 1125F AMNT PAIN NOTED PAIN PRSNT: CPT | Performed by: PHYSICIAN ASSISTANT

## 2023-03-14 PROCEDURE — 3077F SYST BP >= 140 MM HG: CPT | Performed by: PHYSICIAN ASSISTANT

## 2023-03-14 PROCEDURE — 1160F RVW MEDS BY RX/DR IN RCRD: CPT | Performed by: PHYSICIAN ASSISTANT

## 2023-03-14 PROCEDURE — 3078F DIAST BP <80 MM HG: CPT | Performed by: PHYSICIAN ASSISTANT

## 2023-03-14 PROCEDURE — 99204 OFFICE O/P NEW MOD 45 MIN: CPT | Performed by: PHYSICIAN ASSISTANT

## 2023-03-14 PROCEDURE — 1159F MED LIST DOCD IN RCRD: CPT | Performed by: PHYSICIAN ASSISTANT

## 2023-03-14 NOTE — PROGRESS NOTES
CHIEF COMPLAINT  Mr. Dash has low back pain that started 10-12 years ago.     Subjective   Danyel Dash is a 77 y.o. male.   He presents to the office for initial evaluation of chronic low back, right hip and leg pain. He was referred here by JOHANA Mario.  This patient reports a longstanding history of low back pain dating over 10 years ago without precipitating trauma or inciting event.  He illustrates primarily right-sided lumbosacral spine pain which radiates into the right hip/groin and into the lateral aspect of the right thigh terminating proximal to the knee.  Denies any lower extremity numbness, tingling or weakness.  Pain is described as a sharp, achy, throbbing sensation which is aggravated by riding his motorcycle, prolonged walking or standing.    The patient has been under the care of Dr. Montenegro at [facility] where he performed L4-5 LESI's with greater than 8 months relief of pain.  Now that Dr. Montenegro has retired the patient would like to transition his care to our office.  Last L4-5 LESI was performed 9/26/2022 with approximately 50% reduction of pain for 3 months.    He denies any history of cervical or lumbar spine surgeries.    The patient has undergone a formalized course of physical therapy with suboptimal relief of pain and states that some of the PT maneuvers actually increases his pain.    Pain today 1/10 VAS in severity.    Back Pain  This is a chronic problem. The current episode started more than 1 year ago. The problem occurs constantly. The problem has been gradually worsening since onset. The pain is present in the lumbar spine. The quality of the pain is described as aching and shooting (Sharp and throbbing). The pain radiates to the right thigh. The pain is at a severity of 1/10. The pain is mild. The pain is worse during the day. The symptoms are aggravated by position and standing (Walking and riding his motorcycle). Stiffness is present all day. Associated symptoms include leg  pain. Pertinent negatives include no numbness or weakness. He has tried heat, home exercises, ice and NSAIDs for the symptoms. The treatment provided no relief.   Hip Pain   There was no injury mechanism. The pain is present in the right hip. The quality of the pain is described as burning (SHARP). The pain is at a severity of 4/10. The pain is moderate. The pain has been constant since onset. Pertinent negatives include no numbness. He reports no foreign bodies present. The symptoms are aggravated by palpation, weight bearing and movement. He has tried ice and heat for the symptoms. The treatment provided no relief.         PEG Assessment   What number best describes your pain on average in the past week?7  What number best describes how, during the past week, pain has interfered with your enjoyment of life?7  What number best describes how, during the past week, pain has interfered with your general activity?  3        Current Outpatient Medications:   •  acetaminophen (TYLENOL) 325 MG tablet, Take 2 tablets by mouth Every 6 (Six) Hours As Needed for Mild Pain., Disp: , Rfl:   •  albuterol (PROAIR RESPICLICK) 108 (90 Base) MCG/ACT inhaler, Inhale 2 puffs., Disp: , Rfl:   •  amLODIPine (NORVASC) 10 MG tablet, Take 1 tablet by mouth Daily., Disp: 90 tablet, Rfl: 3  •  Blood Gluc Meter Disp-Strips device, Pt to monitor blood glucose two times daily, Disp: 1 each, Rfl: 0  •  buPROPion XL (WELLBUTRIN XL) 300 MG 24 hr tablet, Take 1 tablet by mouth Daily., Disp: 90 tablet, Rfl: 3  •  Cholecalciferol (VITAMIN D-3) 1000 UNITS capsule, Take 1,000 Units by mouth Daily., Disp: , Rfl:   •  Diclofenac Sodium (VOLTAREN) 1 % gel gel, , Disp: , Rfl:   •  diphenhydrAMINE-acetaminophen (TYLENOL PM)  MG tablet per tablet, Take 1 tablet by mouth As Needed., Disp: , Rfl:   •  escitalopram (Lexapro) 10 MG tablet, Take 1 tablet by mouth Every Morning., Disp: 90 tablet, Rfl: 3  •  ferrous gluconate (FERGON) 240 (27 FE) MG tablet, 2  tablets., Disp: , Rfl:   •  fluticasone (Flonase) 50 MCG/ACT nasal spray, 2 sprays into the nostril(s) as directed by provider Daily., Disp: 15.8 mL, Rfl: 3  •  hydrOXYzine (ATARAX) 25 MG tablet, Take 1 tablet by mouth 3 (Three) Times a Day As Needed for Anxiety. for anxiety, Disp: 90 tablet, Rfl: 3  •  lisinopril (PRINIVIL,ZESTRIL) 10 MG tablet, Take 1 tablet by mouth Daily., Disp: 90 tablet, Rfl: 3  •  loratadine (CLARITIN) 10 MG tablet, 1 tablet., Disp: , Rfl:   •  pantoprazole (PROTONIX) 40 MG EC tablet, Take 1 tablet by mouth Daily., Disp: 90 tablet, Rfl: 3  •  pioglitazone (ACTOS) 45 MG tablet, Take 1 tablet by mouth Daily., Disp: 90 tablet, Rfl: 3  •  simvastatin (ZOCOR) 20 MG tablet, Take 1 tablet by mouth Daily., Disp: 90 tablet, Rfl: 3  •  valACYclovir (VALTREX) 500 MG tablet, Take 1 tablet by mouth Daily., Disp: 30 tablet, Rfl: 2  •  vitamin B-12 (CYANOCOBALAMIN) 1000 MCG tablet, Take 1 tablet by mouth Daily., Disp: , Rfl:   •  vitamin C (ASCORBIC ACID) 500 MG tablet, Take 1 tablet by mouth Daily., Disp: , Rfl:     The following portions of the patient's history were reviewed and updated as appropriate: allergies, current medications, past family history, past medical history, past social history, past surgical history and problem list.      REVIEW OF PERTINENT MEDICAL DATA    A1C 6.7--07/27/22    Reviewed from JOHANA Mario note dated 2/6/2023:  IMAGING:      Indication: pain related symptoms,  Views: 2V AP&LAT lumbar  Findings: Personally reviewed and reveals maintenance of the normal lordosis, mild degenerative disc and facet changes primarily in the lumbar spine, mild retrolisthesis L3 on L4 and L4 on L5  Comparison views: No significant change from 11/12/2021    MRI OF THE LUMBAR SPINE WITHOUT CONTRAST 02/10/2017     CLINICAL HISTORY: Chronic low back pain, some right-sided hip  pain-right-sided sciatica     TECHNIQUE: Sagittal T1, proton density and fat-suppressed T2-weighted  images were  obtained of the lumbar spine In addition axial T2-weighted  images were obtained from L1 to S1 thin cut axial T1-weighted images  were obtained angled through the interspaces from L3 to S1.     There are no prior lumbar spine imaging studies for comparison.     FINDINGS: The distal thoracic cord and conus is normal in signal  intensity. The conus terminates at the L1 lumbar level which is normal.     The T11-12, T12-L1, L1-L2 disc space and facets are normal with no canal  or foraminal narrowing from T11 to L2.     At L2-3, there is minimal disc desiccation and minimal bilateral facet  overgrowth, posterior disc margin is normal, there is no canal or  foraminal narrowing at L2-3.     At L3-4, there is minimal bilateral facet overgrowth, there is mild disc  desiccation, minimal diffuse posterior disc bulge There is prominent  posterior right left anterolateral epidural fat that encases the thecal  sac results in some generalized narrowing of the thecal sac. There is no  foraminal narrowing.     At L4-5, there is mild-to-moderate bilateral facet overgrowth, there is  mild disc desiccation and minimal diffuse posterior disc osteophyte  complex, there is prominent posterior and right and left anterolateral  epidural fat that encases the thecal sac results in generalized  narrowing of the thecal sac. There is no foraminal narrowing.     At L5-S1, there is mild to moderate bilateral facet overgrowth, there is  some disc space narrowing, degenerative endplate change and mild diffuse  posterior disc osteophyte complex, there is prominent epidural fat  encases the thecal sac. There is no significant canal or lateral recess  narrowing. There is spurring into the foramina with mild right at least  mild up to mild/moderate left bony foraminal narrowing.     IMPRESSION:     1. Very mild lumbar spondylosis as described. No lumbar disc herniation,  no bony canal narrowing identified. There is prominent epidural fat that  encases  "the thecal sac at L3-4 and L4-5 results in generalized narrowing  of the thecal sac probably of no significance.     2. There is minimal disc desiccation, bilateral facet overgrowth at L2-3  and L3-4 and there is mild-to-moderate bilateral facet overgrowth at  L4-5. There is mild/moderate bilateral facet overgrowth at L5-S1 with  diffuse posterior disc osteophyte complex and there is spurring into the  foramina with mild right at least mild up to mild/moderate left bony  foraminal narrowing at L5-S1. The remainder of the lumbar spine MRI is  unremarkable.     This report was finalized on 2/14/2017 8:49 AM by Dr. Juan Paul MD.    Review of Systems   Gastrointestinal: Negative for constipation and diarrhea.   Genitourinary: Negative for difficulty urinating.   Musculoskeletal: Positive for back pain.   Neurological: Negative for weakness and numbness.   Psychiatric/Behavioral: Negative for sleep disturbance and suicidal ideas. The patient is nervous/anxious.      I have reviewed and confirmed the accuracy of the ROS as documented by the MA/LPN/RN EH Diez      Vitals:    03/14/23 1353   BP: 143/60   Pulse: 72   Resp: 18   Temp: 98.4 °F (36.9 °C)   SpO2: 97%   Weight: 115 kg (254 lb 3.2 oz)   Height: 175.3 cm (69\")   PainSc:   1   PainLoc: Back         Objective   Physical Exam  Vitals and nursing note reviewed. Chaperone present: Wife is present.   Constitutional:       Appearance: Normal appearance.   HENT:      Head: Normocephalic.   Pulmonary:      Effort: Pulmonary effort is normal.   Musculoskeletal:      Lumbar back: Spasms and tenderness (Moderate tenderness on the right side of the lumbar spine as well as over the lumbar facet joint spaces) present. Decreased range of motion. Positive right straight leg raise test (Straight leg positive to the knee only).        Back:       Comments: Pain is increased with lumbar flexion and lateral rotation only   Skin:     General: Skin is warm and dry. "   Neurological:      General: No focal deficit present.      Mental Status: He is alert and oriented to person, place, and time.      Cranial Nerves: Cranial nerves 2-12 are intact.      Sensory: Sensation is intact.      Motor: Motor function is intact.      Gait: Gait abnormal.      Deep Tendon Reflexes:      Reflex Scores:       Patellar reflexes are 2+ on the right side and 2+ on the left side.       Achilles reflexes are 2+ on the right side and 2+ on the left side.  Psychiatric:         Mood and Affect: Mood normal.         Behavior: Behavior normal.         Thought Content: Thought content normal.         Judgment: Judgment normal.         Assessment & Plan   Diagnoses and all orders for this visit:    1. DDD (degenerative disc disease), lumbar (Primary)  -     MRI Lumbar Spine Without Contrast; Future    2. Lumbar radiculopathy    3. Lumbar facet arthropathy        --- Based on patient's physical exam findings as well as previous response to L4-5 LESI's I recommend him returning for therapeutic L4-5 LESI injection with a right paramedian approach along with right greater trochanteric bursa injection.  --- As the patient is noted progressive worsening of pain I do recommend updating lumbar MRI without contrast for evaluation of disc herniation and/or nerve root impingement.  I have explained to the patient that I would like for him to have the MRI completed prior to undergoing injective therapy with our office.  --- RTC in 4 weeks following injective therapy and to review lumbar MRI findings    Pain / Disability Scale    The scale used for measurement of pain and/or disability for this patient was the [place] back pain disability scale.  The score was 51 on 03/14/2023           Risk of serious complications from epidural injections:  best estimates of incidence (updated September 2021)  - These statistics are derived from several academic publications.    - The disclaimer is that this may not be a completely  exhaustive list, and this does not address common side effects from procedures such as postanesthesia care unit nausea, procedural site soreness, numbness from local anesthetic numbing medication, etc.    - This list does comprehensively hope to address reasonable potential serious complications from these interventional neuraxial procedures, and thankfully all are quite rare.    - Safety from interventional pain procedures is the product of appropriate skill and training, and this includes the entire medical and nursing team understanding of these procedures and the process, fellowship trained and board certified interventional pain specialists, and use of appropriate imaging modalities to confirm the appropriate application of the techniques  -Overall incidence of any serious complication that includes temporary and reversible complications has been estimated at 0.11%.  Overall incidence of serious complications requiring additional medical treatment is estimated at <1 in  20,000 (0.02%)   -Incidence of an infection after a procedure is estimated at <1:50,000, and risk of a severe infection such as epidural abscess estimated at less than 1 in 500,000  -Risk of severe bleeding, epidural hematoma, is somewhere between 1 and 150,000-220,000  - Temporary numbness or short-term paralysis after injection is very rare.  At this time I have not been able to find a definitive number on reversible nerve problems after injection.  There have been 114 total claims of paralysis after epidural injection in the [place] over the past 30 years.  In context, there are about 9 million epidural injections performed in the [place] every year.  - Risk of death after procedure is so incredibly rare and cannot be estimated.  There are statistics about maternal mortality when epidurals are used in the obstetric setting, but these numbers are not relevant to interventional pain  -Risk of bone loss or osteoporosis from epidural  "steroid injections has not been shown to be an independent risk factor.  This was noted from research from the journal Pain Physician in 2012.  In our practice, we still want to be careful not to give too many steroids too often and avoid high annual total doses of steroids  -Inpatient see you have significant depression or harmeet are other psychiatric comorbidities, there is a less than 1% chance of the spacings having mood problems after an epidural steroid injection.  We have to weigh that risk, which is somewhere between 0.01% and 0.1% with the risk of worsening of the psychiatric comorbidities because of depressive symptoms associated with chronic pain or severe pain.  There has been one case report of a person without any psychiatric history having what was called \"steroid harmeet\" after having epidural steroid injection.  -With regards to blood glucose levels, we have long been concerned about hyperglycemia after giving steroids.  There has not been shown to be any association with increasing blood sugar or increasing risk of diabetes in patients who do not already have diabetes.  There is evidence that patients with diabetes may have increased blood sugar levels for 1 to 2 days after an epidural steroid injection, and this may be a  point increase, but it resolves within 24 hours.  There is no association with increasing hemoglobin A1c with epidural steroid injections which is reassuring regarding short or even long-term detrimental effects even in patients with diabetes  -Steroids given in the epidural space do not seem to affect the adrenal glands in the same way that steroids do that are given intravenously or in muscular injections or an pill form to the GI tract.  Adrenal gland suppression from epidural steroids is shown to be quite rare, and temporary when it does occur.  -Risk of dural puncture, which is a leak of spinal fluid, is estimated somewhere between 0.1% and 1%.  Again this data includes " epidural injections being placed in the obstetric setting as well as interventional pain setting, and it is the opinion of this practice that with the assistance of image guidance in a controlled setting that the risk of a dural puncture is lower than it would be as compared to using nonimage guided techniques placing an epidural in a woman in labor.      JUNE REPORT    JUNE report has been reviewed and scanned into the patient's chart.    As the clinician, I personally reviewed the JUNE from 3/14/2023 while the patient was in the office today.       Dictated utilizing Dragon dictation.

## 2023-03-14 NOTE — H&P (VIEW-ONLY)
CHIEF COMPLAINT  Mr. Dash has low back pain that started 10-12 years ago.     Subjective   Danyel Dash is a 77 y.o. male.   He presents to the office for initial evaluation of chronic low back, right hip and leg pain. He was referred here by JOHANA Mario.  This patient reports a longstanding history of low back pain dating over 10 years ago without precipitating trauma or inciting event.  He illustrates primarily right-sided lumbosacral spine pain which radiates into the right hip/groin and into the lateral aspect of the right thigh terminating proximal to the knee.  Denies any lower extremity numbness, tingling or weakness.  Pain is described as a sharp, achy, throbbing sensation which is aggravated by riding his motorcycle, prolonged walking or standing.    The patient has been under the care of Dr. Montenegro at [facility] where he performed L4-5 LESI's with greater than 8 months relief of pain.  Now that Dr. Montenegro has retired the patient would like to transition his care to our office.  Last L4-5 LESI was performed 9/26/2022 with approximately 50% reduction of pain for 3 months.    He denies any history of cervical or lumbar spine surgeries.    The patient has undergone a formalized course of physical therapy with suboptimal relief of pain and states that some of the PT maneuvers actually increases his pain.    Pain today 1/10 VAS in severity.    Back Pain  This is a chronic problem. The current episode started more than 1 year ago. The problem occurs constantly. The problem has been gradually worsening since onset. The pain is present in the lumbar spine. The quality of the pain is described as aching and shooting (Sharp and throbbing). The pain radiates to the right thigh. The pain is at a severity of 1/10. The pain is mild. The pain is worse during the day. The symptoms are aggravated by position and standing (Walking and riding his motorcycle). Stiffness is present all day. Associated symptoms include leg  pain. Pertinent negatives include no numbness or weakness. He has tried heat, home exercises, ice and NSAIDs for the symptoms. The treatment provided no relief.   Hip Pain   There was no injury mechanism. The pain is present in the right hip. The quality of the pain is described as burning (SHARP). The pain is at a severity of 4/10. The pain is moderate. The pain has been constant since onset. Pertinent negatives include no numbness. He reports no foreign bodies present. The symptoms are aggravated by palpation, weight bearing and movement. He has tried ice and heat for the symptoms. The treatment provided no relief.         PEG Assessment   What number best describes your pain on average in the past week?7  What number best describes how, during the past week, pain has interfered with your enjoyment of life?7  What number best describes how, during the past week, pain has interfered with your general activity?  3        Current Outpatient Medications:   •  acetaminophen (TYLENOL) 325 MG tablet, Take 2 tablets by mouth Every 6 (Six) Hours As Needed for Mild Pain., Disp: , Rfl:   •  albuterol (PROAIR RESPICLICK) 108 (90 Base) MCG/ACT inhaler, Inhale 2 puffs., Disp: , Rfl:   •  amLODIPine (NORVASC) 10 MG tablet, Take 1 tablet by mouth Daily., Disp: 90 tablet, Rfl: 3  •  Blood Gluc Meter Disp-Strips device, Pt to monitor blood glucose two times daily, Disp: 1 each, Rfl: 0  •  buPROPion XL (WELLBUTRIN XL) 300 MG 24 hr tablet, Take 1 tablet by mouth Daily., Disp: 90 tablet, Rfl: 3  •  Cholecalciferol (VITAMIN D-3) 1000 UNITS capsule, Take 1,000 Units by mouth Daily., Disp: , Rfl:   •  Diclofenac Sodium (VOLTAREN) 1 % gel gel, , Disp: , Rfl:   •  diphenhydrAMINE-acetaminophen (TYLENOL PM)  MG tablet per tablet, Take 1 tablet by mouth As Needed., Disp: , Rfl:   •  escitalopram (Lexapro) 10 MG tablet, Take 1 tablet by mouth Every Morning., Disp: 90 tablet, Rfl: 3  •  ferrous gluconate (FERGON) 240 (27 FE) MG tablet, 2  tablets., Disp: , Rfl:   •  fluticasone (Flonase) 50 MCG/ACT nasal spray, 2 sprays into the nostril(s) as directed by provider Daily., Disp: 15.8 mL, Rfl: 3  •  hydrOXYzine (ATARAX) 25 MG tablet, Take 1 tablet by mouth 3 (Three) Times a Day As Needed for Anxiety. for anxiety, Disp: 90 tablet, Rfl: 3  •  lisinopril (PRINIVIL,ZESTRIL) 10 MG tablet, Take 1 tablet by mouth Daily., Disp: 90 tablet, Rfl: 3  •  loratadine (CLARITIN) 10 MG tablet, 1 tablet., Disp: , Rfl:   •  pantoprazole (PROTONIX) 40 MG EC tablet, Take 1 tablet by mouth Daily., Disp: 90 tablet, Rfl: 3  •  pioglitazone (ACTOS) 45 MG tablet, Take 1 tablet by mouth Daily., Disp: 90 tablet, Rfl: 3  •  simvastatin (ZOCOR) 20 MG tablet, Take 1 tablet by mouth Daily., Disp: 90 tablet, Rfl: 3  •  valACYclovir (VALTREX) 500 MG tablet, Take 1 tablet by mouth Daily., Disp: 30 tablet, Rfl: 2  •  vitamin B-12 (CYANOCOBALAMIN) 1000 MCG tablet, Take 1 tablet by mouth Daily., Disp: , Rfl:   •  vitamin C (ASCORBIC ACID) 500 MG tablet, Take 1 tablet by mouth Daily., Disp: , Rfl:     The following portions of the patient's history were reviewed and updated as appropriate: allergies, current medications, past family history, past medical history, past social history, past surgical history and problem list.      REVIEW OF PERTINENT MEDICAL DATA    A1C 6.7--07/27/22    Reviewed from JOHANA Mario note dated 2/6/2023:  IMAGING:      Indication: pain related symptoms,  Views: 2V AP&LAT lumbar  Findings: Personally reviewed and reveals maintenance of the normal lordosis, mild degenerative disc and facet changes primarily in the lumbar spine, mild retrolisthesis L3 on L4 and L4 on L5  Comparison views: No significant change from 11/12/2021    MRI OF THE LUMBAR SPINE WITHOUT CONTRAST 02/10/2017     CLINICAL HISTORY: Chronic low back pain, some right-sided hip  pain-right-sided sciatica     TECHNIQUE: Sagittal T1, proton density and fat-suppressed T2-weighted  images were  obtained of the lumbar spine In addition axial T2-weighted  images were obtained from L1 to S1 thin cut axial T1-weighted images  were obtained angled through the interspaces from L3 to S1.     There are no prior lumbar spine imaging studies for comparison.     FINDINGS: The distal thoracic cord and conus is normal in signal  intensity. The conus terminates at the L1 lumbar level which is normal.     The T11-12, T12-L1, L1-L2 disc space and facets are normal with no canal  or foraminal narrowing from T11 to L2.     At L2-3, there is minimal disc desiccation and minimal bilateral facet  overgrowth, posterior disc margin is normal, there is no canal or  foraminal narrowing at L2-3.     At L3-4, there is minimal bilateral facet overgrowth, there is mild disc  desiccation, minimal diffuse posterior disc bulge There is prominent  posterior right left anterolateral epidural fat that encases the thecal  sac results in some generalized narrowing of the thecal sac. There is no  foraminal narrowing.     At L4-5, there is mild-to-moderate bilateral facet overgrowth, there is  mild disc desiccation and minimal diffuse posterior disc osteophyte  complex, there is prominent posterior and right and left anterolateral  epidural fat that encases the thecal sac results in generalized  narrowing of the thecal sac. There is no foraminal narrowing.     At L5-S1, there is mild to moderate bilateral facet overgrowth, there is  some disc space narrowing, degenerative endplate change and mild diffuse  posterior disc osteophyte complex, there is prominent epidural fat  encases the thecal sac. There is no significant canal or lateral recess  narrowing. There is spurring into the foramina with mild right at least  mild up to mild/moderate left bony foraminal narrowing.     IMPRESSION:     1. Very mild lumbar spondylosis as described. No lumbar disc herniation,  no bony canal narrowing identified. There is prominent epidural fat that  encases  "the thecal sac at L3-4 and L4-5 results in generalized narrowing  of the thecal sac probably of no significance.     2. There is minimal disc desiccation, bilateral facet overgrowth at L2-3  and L3-4 and there is mild-to-moderate bilateral facet overgrowth at  L4-5. There is mild/moderate bilateral facet overgrowth at L5-S1 with  diffuse posterior disc osteophyte complex and there is spurring into the  foramina with mild right at least mild up to mild/moderate left bony  foraminal narrowing at L5-S1. The remainder of the lumbar spine MRI is  unremarkable.     This report was finalized on 2/14/2017 8:49 AM by Dr. Juan Paul MD.    Review of Systems   Gastrointestinal: Negative for constipation and diarrhea.   Genitourinary: Negative for difficulty urinating.   Musculoskeletal: Positive for back pain.   Neurological: Negative for weakness and numbness.   Psychiatric/Behavioral: Negative for sleep disturbance and suicidal ideas. The patient is nervous/anxious.      I have reviewed and confirmed the accuracy of the ROS as documented by the MA/LPN/RN EH Diez      Vitals:    03/14/23 1353   BP: 143/60   Pulse: 72   Resp: 18   Temp: 98.4 °F (36.9 °C)   SpO2: 97%   Weight: 115 kg (254 lb 3.2 oz)   Height: 175.3 cm (69\")   PainSc:   1   PainLoc: Back         Objective   Physical Exam  Vitals and nursing note reviewed. Chaperone present: Wife is present.   Constitutional:       Appearance: Normal appearance.   HENT:      Head: Normocephalic.   Pulmonary:      Effort: Pulmonary effort is normal.   Musculoskeletal:      Lumbar back: Spasms and tenderness (Moderate tenderness on the right side of the lumbar spine as well as over the lumbar facet joint spaces) present. Decreased range of motion. Positive right straight leg raise test (Straight leg positive to the knee only).        Back:       Comments: Pain is increased with lumbar flexion and lateral rotation only   Skin:     General: Skin is warm and dry. "   Neurological:      General: No focal deficit present.      Mental Status: He is alert and oriented to person, place, and time.      Cranial Nerves: Cranial nerves 2-12 are intact.      Sensory: Sensation is intact.      Motor: Motor function is intact.      Gait: Gait abnormal.      Deep Tendon Reflexes:      Reflex Scores:       Patellar reflexes are 2+ on the right side and 2+ on the left side.       Achilles reflexes are 2+ on the right side and 2+ on the left side.  Psychiatric:         Mood and Affect: Mood normal.         Behavior: Behavior normal.         Thought Content: Thought content normal.         Judgment: Judgment normal.         Assessment & Plan   Diagnoses and all orders for this visit:    1. DDD (degenerative disc disease), lumbar (Primary)  -     MRI Lumbar Spine Without Contrast; Future    2. Lumbar radiculopathy    3. Lumbar facet arthropathy        --- Based on patient's physical exam findings as well as previous response to L4-5 LESI's I recommend him returning for therapeutic L4-5 LESI injection with a right paramedian approach along with right greater trochanteric bursa injection.  --- As the patient is noted progressive worsening of pain I do recommend updating lumbar MRI without contrast for evaluation of disc herniation and/or nerve root impingement.  I have explained to the patient that I would like for him to have the MRI completed prior to undergoing injective therapy with our office.  --- RTC in 4 weeks following injective therapy and to review lumbar MRI findings    Pain / Disability Scale    The scale used for measurement of pain and/or disability for this patient was the [place] back pain disability scale.  The score was 51 on 03/14/2023           Risk of serious complications from epidural injections:  best estimates of incidence (updated September 2021)  - These statistics are derived from several academic publications.    - The disclaimer is that this may not be a completely  exhaustive list, and this does not address common side effects from procedures such as postanesthesia care unit nausea, procedural site soreness, numbness from local anesthetic numbing medication, etc.    - This list does comprehensively hope to address reasonable potential serious complications from these interventional neuraxial procedures, and thankfully all are quite rare.    - Safety from interventional pain procedures is the product of appropriate skill and training, and this includes the entire medical and nursing team understanding of these procedures and the process, fellowship trained and board certified interventional pain specialists, and use of appropriate imaging modalities to confirm the appropriate application of the techniques  -Overall incidence of any serious complication that includes temporary and reversible complications has been estimated at 0.11%.  Overall incidence of serious complications requiring additional medical treatment is estimated at <1 in  20,000 (0.02%)   -Incidence of an infection after a procedure is estimated at <1:50,000, and risk of a severe infection such as epidural abscess estimated at less than 1 in 500,000  -Risk of severe bleeding, epidural hematoma, is somewhere between 1 and 150,000-220,000  - Temporary numbness or short-term paralysis after injection is very rare.  At this time I have not been able to find a definitive number on reversible nerve problems after injection.  There have been 114 total claims of paralysis after epidural injection in the [place] over the past 30 years.  In context, there are about 9 million epidural injections performed in the [place] every year.  - Risk of death after procedure is so incredibly rare and cannot be estimated.  There are statistics about maternal mortality when epidurals are used in the obstetric setting, but these numbers are not relevant to interventional pain  -Risk of bone loss or osteoporosis from epidural  "steroid injections has not been shown to be an independent risk factor.  This was noted from research from the journal Pain Physician in 2012.  In our practice, we still want to be careful not to give too many steroids too often and avoid high annual total doses of steroids  -Inpatient see you have significant depression or harmeet are other psychiatric comorbidities, there is a less than 1% chance of the spacings having mood problems after an epidural steroid injection.  We have to weigh that risk, which is somewhere between 0.01% and 0.1% with the risk of worsening of the psychiatric comorbidities because of depressive symptoms associated with chronic pain or severe pain.  There has been one case report of a person without any psychiatric history having what was called \"steroid harmeet\" after having epidural steroid injection.  -With regards to blood glucose levels, we have long been concerned about hyperglycemia after giving steroids.  There has not been shown to be any association with increasing blood sugar or increasing risk of diabetes in patients who do not already have diabetes.  There is evidence that patients with diabetes may have increased blood sugar levels for 1 to 2 days after an epidural steroid injection, and this may be a  point increase, but it resolves within 24 hours.  There is no association with increasing hemoglobin A1c with epidural steroid injections which is reassuring regarding short or even long-term detrimental effects even in patients with diabetes  -Steroids given in the epidural space do not seem to affect the adrenal glands in the same way that steroids do that are given intravenously or in muscular injections or an pill form to the GI tract.  Adrenal gland suppression from epidural steroids is shown to be quite rare, and temporary when it does occur.  -Risk of dural puncture, which is a leak of spinal fluid, is estimated somewhere between 0.1% and 1%.  Again this data includes " epidural injections being placed in the obstetric setting as well as interventional pain setting, and it is the opinion of this practice that with the assistance of image guidance in a controlled setting that the risk of a dural puncture is lower than it would be as compared to using nonimage guided techniques placing an epidural in a woman in labor.      JUNE REPORT    JUNE report has been reviewed and scanned into the patient's chart.    As the clinician, I personally reviewed the JUNE from 3/14/2023 while the patient was in the office today.       Dictated utilizing Dragon dictation.

## 2023-03-20 ENCOUNTER — TRANSCRIBE ORDERS (OUTPATIENT)
Dept: SURGERY | Facility: SURGERY CENTER | Age: 78
End: 2023-03-20
Payer: MEDICARE

## 2023-03-20 DIAGNOSIS — Z41.9 SURGERY, ELECTIVE: Primary | ICD-10-CM

## 2023-04-03 ENCOUNTER — APPOINTMENT (OUTPATIENT)
Dept: GENERAL RADIOLOGY | Facility: SURGERY CENTER | Age: 78
End: 2023-04-03
Payer: MEDICARE

## 2023-04-06 NOTE — DISCHARGE INSTRUCTIONS
[facility] - Post-procedure Instructions          --  While there are no absolute restrictions, it is recommended that you do not perform strenuous activity today. In the morning, you may resume your level of activity as before your block.    --  If you have a band-aid at your injection site, please remove it later today. Observe the area for any redness, swelling, pus-like drainage, or a temperature over 101°. If any of these symptoms occur, please call your doctor at [phone]. If after office hours, leave a message and the on-call provider will return your call.    --  Ice may be applied to your injection site. It is recommended you avoid direct heat (heating pad; hot tub) for 1-2 days.    --  Call [facility] at [phone] if you experience persistent headache, persistent bleeding from the injection site, or severe pain not relieved by heat or oral medication.    --  Do not make important decisions today.    --  Due to the effects of the block and/or the I.V. Sedation, DO NOT drive or operate hazardous machinery for 12 hours.  Local anesthetics may cause numbness after procedure and precautions must be taken with regards to operating equipment as well as with walking, even if ambulating with assistance of another person or with an assistive device.    --  Do not drink alcohol for 12 hours.    -- You may return to work tomorrow, or as directed by your referring doctor.    --  Occasionally you may notice a slight increase in your pain after the procedure. This should start to improve within the next 24-48 hours. Radiofrequency ablation procedure pain may last 3-4 weeks.    --  It may take as long as 3-4 days before you notice a gradual improvement in your pain and/or other symptoms.    -- You may continue to take your prescribed pain medication as needed.    --  Some normal possible side effects of steroid use could include fluid retention, increased blood sugar, dull headache,  increased sweating, increased appetite, mood swings and flushing.    --  Diabetics are recommended to watch their blood glucose level closely for 24-48 hours after the injection.    --  Must stay in PACU for 20 min upon arrival and prove no leg weakness before being discharged.    --  IN THE EVENT OF A LIFE THREATENING EMERGENCY, (CHEST PAIN, BREATHING DIFFICULTIES, PARALYSIS…) YOU SHOULD GO TO YOUR NEAREST EMERGENCY ROOM.    --  You should be contacted by our office within 2-3 days to schedule follow up or next appointment date.  If not contacted within 7 days, please call the office at [phone]

## 2023-04-07 ENCOUNTER — HOSPITAL ENCOUNTER (OUTPATIENT)
Dept: GENERAL RADIOLOGY | Facility: SURGERY CENTER | Age: 78
Setting detail: HOSPITAL OUTPATIENT SURGERY
End: 2023-04-07
Payer: MEDICARE

## 2023-04-07 ENCOUNTER — HOSPITAL ENCOUNTER (OUTPATIENT)
Facility: SURGERY CENTER | Age: 78
Setting detail: HOSPITAL OUTPATIENT SURGERY
Discharge: HOME | End: 2023-04-07
Attending: ANESTHESIOLOGY | Admitting: ANESTHESIOLOGY
Payer: MEDICARE

## 2023-04-07 VITALS
TEMPERATURE: 98.7 F | OXYGEN SATURATION: 96 % | WEIGHT: 254 LBS | SYSTOLIC BLOOD PRESSURE: 150 MMHG | HEART RATE: 62 BPM | RESPIRATION RATE: 16 BRPM | BODY MASS INDEX: 37.62 KG/M2 | DIASTOLIC BLOOD PRESSURE: 76 MMHG | HEIGHT: 69 IN

## 2023-04-07 DIAGNOSIS — Z41.9 SURGERY, ELECTIVE: ICD-10-CM

## 2023-04-07 PROCEDURE — 20610 DRAIN/INJ JOINT/BURSA W/O US: CPT | Performed by: ANESTHESIOLOGY

## 2023-04-07 PROCEDURE — 62323 NJX INTERLAMINAR LMBR/SAC: CPT | Performed by: ANESTHESIOLOGY

## 2023-04-07 PROCEDURE — 25510000001 IOPAMIDOL 61 % SOLUTION 30 ML VIAL: Performed by: ANESTHESIOLOGY

## 2023-04-07 PROCEDURE — 25010000002 DEXAMETHASONE SODIUM PHOSPHATE 100 MG/10ML SOLUTION 10 ML VIAL: Performed by: ANESTHESIOLOGY

## 2023-04-07 PROCEDURE — 77002 NEEDLE LOCALIZATION BY XRAY: CPT

## 2023-04-07 NOTE — OP NOTE
L4/L5 Interlaminar Lumbar Epidural Steroid Injection and Right Trochanteric Bursa Injection  [facility]    PREOPERATIVE DIAGNOSIS:   Lumbar Radiculopathy and Right trochanteric bursitis  POSTOPERATIVE DIAGNOSIS:  Same as preop diagnosis    PROCEDURE:   Lumbar Epidural Steroid Injection, Therapeutic Interlaminar Injection, with epidurogram, at  L4/L5 level    PRE-PROCEDURE DISCUSSION WITH PATIENT:    Risks and complications were discussed with the patient prior to starting the procedure and informed consent was obtained.  We discussed various topics including but not limited to bleeding, infection, injury, paralysis, nerve injury, dural puncture, coma, death, worsening of clinical picture, lack of pain relief, and postprocedural soreness.    SURGEON:  Shania Warner MD    REASON FOR PROCEDURE:    Previous clinically significant therapeutic effect is noted. and tenderness of the right trochanteric bursa    SEDATION:  No sedation was used for this procedure  ANESTHETIC:  Marcaine 0.25%  STEROID:   15mg dexamethasone (10mg for epidural and 5mg for bursa)    DESCRIPTON OF PROCEDURE:    After obtaining informed consent, I.V. was not started in the preop area.   The patient was taken to the operating room and placed in the prone position.  EKG, blood pressure, and pulse oximeter were monitored throughout, and sedation was provided as needed by the RN under my guidance. All pressure points were well padded.  The lumbar spine area was prepped with Chloraprep and draped in a sterile fashion.      AP fluoroscopic image was used to visualize the L4/L5 interspace.  The skin and subcutaneous tissue over the area was anesthetized with 1% Lidocaine.  An 18-Gauge Tuohy needle was then advanced through the anesthetized skin tract under fluoroscopic guidance in a coaxial view using a loss of resistance technique.  Lateral fluoroscopy was used to verify appropriate needle depth.  Once the needle tip was felt to be  in the posterior epidural space, aspiration was noted to be negative for blood or CSF.  A volume of 0.5mL of Isovue was then injected under live fluoroscopy in an AP view which produced good epidural spread with no evidence of loculation, vascular run-off or intrathecal spread.  Subsequently, a total volume of 5mL consisting of 10mg of dexamethasone, 5mL of 0.25% bupivcacaine and normal saline was injected without resistance.  The needle was removed intact.     The RIGHT  greater trochanter was identified fluoroscopically.  The point of maximum tenderness at the site was identified via palpation.  The skin and subcutaneous tissue overlying the area was anesthetized with 1% Lidocaine.  A 22-gauge spinal needle was then advanced percutaneously through the anesthetized skin tract under fluoroscopic guidance to contact periosteum at the target site.  After negative aspiration, a volume of 5mL consisting of 5mg of Dexamethasone mixed with 4.5mL of 0.25% Bupivacaine was injected.       ESTIMATED BLOOD LOSS:  <5 mL  SPECIMENS:  None    COMPLICATIONS:     No complications were noted., There was no indication of vascular uptake on live injection of contrast dye. and There was no indication of intrathecal uptake on live injection of contrast dye.    TOLERANCE & DISCHARGE CONDITION:    The patient tolerated the procedure well.  The patient was transported to the recovery area without difficulties.  The patient was discharged to home under the care of family in stable and satisfactory condition.    PLAN OF CARE:  1. The patient was given our standard instruction sheet.  2. The patient will Return to clinic 3-4 wks  3. The patient will resume all medications as per the medication reconciliation sheet.

## 2023-04-17 ENCOUNTER — PRIOR AUTHORIZATION (OUTPATIENT)
Dept: FAMILY MEDICINE CLINIC | Facility: CLINIC | Age: 78
End: 2023-04-17
Payer: MEDICARE

## 2023-05-01 ENCOUNTER — OFFICE VISIT (OUTPATIENT)
Dept: PAIN MEDICINE | Facility: CLINIC | Age: 78
End: 2023-05-01
Payer: MEDICARE

## 2023-05-01 VITALS
BODY MASS INDEX: 36.46 KG/M2 | OXYGEN SATURATION: 96 % | HEIGHT: 69 IN | DIASTOLIC BLOOD PRESSURE: 58 MMHG | TEMPERATURE: 97.1 F | RESPIRATION RATE: 18 BRPM | SYSTOLIC BLOOD PRESSURE: 135 MMHG | HEART RATE: 66 BPM | WEIGHT: 246.2 LBS

## 2023-05-01 DIAGNOSIS — M70.61 GREATER TROCHANTERIC BURSITIS OF RIGHT HIP: ICD-10-CM

## 2023-05-01 DIAGNOSIS — M51.36 DDD (DEGENERATIVE DISC DISEASE), LUMBAR: ICD-10-CM

## 2023-05-01 DIAGNOSIS — M54.16 LUMBAR RADICULOPATHY: Primary | ICD-10-CM

## 2023-05-01 DIAGNOSIS — M47.816 LUMBAR FACET ARTHROPATHY: ICD-10-CM

## 2023-05-01 NOTE — PROGRESS NOTES
CHIEF COMPLAINT  Procedure follow up GREATER TROCHANTERIC BURSA INJECTION RIGHT TO BE PERFORMED WITH LESI  4-7-23  Patient reports 75% pain relief from injection with ongoing effect.     Subjective   Danyel Dash is a 77 y.o. male  who presents to the office for follow-up of procedure.  He completed a therapeutic L4/5 LESI with right trochanteric bursa injection   on 4/7/2023 performed by Dr. Warner for management of low back pain. Patient reports 75% ongoing relief from the procedure.  Patient is pleased to report significant improvement primarily right-sided lumbosacral spine pain which radiates into the right hip and groin and into the lateral aspect of the thigh terminating proximal to the knee.  Continues to note significant improvement of pain as well as improvement of range of motion/activity since undergoing injective therapy.    Pain today 3/10 VAS in severity.        Back Pain  This is a chronic problem. The current episode started more than 1 year ago. The problem occurs constantly. The problem is unchanged. The pain is present in the lumbar spine. The quality of the pain is described as aching and burning. The pain radiates to the right thigh. The pain is at a severity of 3/10. The pain is mild. The pain is the same all the time. The symptoms are aggravated by position and standing (Riding his motorcycle). Pertinent negatives include no abdominal pain, dysuria, fever, headaches, numbness or weakness.        PEG Assessment   What number best describes your pain on average in the past week?4  What number best describes how, during the past week, pain has interfered with your enjoyment of life?0  What number best describes how, during the past week, pain has interfered with your general activity?  0    Review of Pertinent Medical Data ---    HgA1C 6.7--07/27/22             The following portions of the patient's history were reviewed and updated as appropriate: allergies, current medications, past family  "history, past medical history, past social history, past surgical history and problem list.    Review of Systems   Constitutional: Positive for activity change. Negative for fatigue and fever.   HENT: Negative for congestion.    Eyes: Negative for visual disturbance.   Respiratory: Negative for cough and chest tightness.    Gastrointestinal: Negative for abdominal pain, constipation and diarrhea.   Genitourinary: Negative for difficulty urinating and dysuria.   Musculoskeletal: Positive for back pain.   Neurological: Negative for dizziness, weakness, light-headedness, numbness and headaches.   Psychiatric/Behavioral: Negative for agitation, sleep disturbance and suicidal ideas. The patient is not nervous/anxious.      I have reviewed and confirmed the accuracy of the ROS as documented by the MA/LPN/RN EH Diez     Vitals:    05/01/23 1124   BP: 135/58   BP Location: Right arm   Patient Position: Sitting   Cuff Size: Large Adult   Pulse: 66   Resp: 18   Temp: 97.1 °F (36.2 °C)   TempSrc: Temporal   SpO2: 96%   Weight: 112 kg (246 lb 3.2 oz)   Height: 175.3 cm (69\")   PainSc:   3         Objective   Physical Exam  Vitals and nursing note reviewed.   Constitutional:       Appearance: Normal appearance.   HENT:      Head: Normocephalic.   Pulmonary:      Effort: Pulmonary effort is normal.   Musculoskeletal:      Lumbar back: Tenderness present. Decreased range of motion.   Skin:     General: Skin is warm and dry.   Neurological:      General: No focal deficit present.      Mental Status: He is alert and oriented to person, place, and time.      Cranial Nerves: Cranial nerves 2-12 are intact.      Sensory: Sensation is intact.      Motor: Motor function is intact.      Gait: Gait is intact.   Psychiatric:         Mood and Affect: Mood normal.         Behavior: Behavior normal.         Thought Content: Thought content normal.         Judgment: Judgment normal.         Assessment & Plan   Diagnoses and all " orders for this visit:    1. Lumbar radiculopathy (Primary)    2. DDD (degenerative disc disease), lumbar    3. Greater trochanteric bursitis of right hip    4. Lumbar facet arthropathy        --- Follow-up as needed as needed for repeat injective therapy.  Patient states that he will on average obtains 7-8 months relief of pain following therapeutic LESI therefore he will contact the office when he is ready for further evaluation and treatment              Dictated utilizing Dragon dictation.

## 2023-08-03 ENCOUNTER — TELEPHONE (OUTPATIENT)
Dept: PAIN MEDICINE | Facility: CLINIC | Age: 78
End: 2023-08-03

## 2023-08-03 NOTE — TELEPHONE ENCOUNTER
"    Caller: Danyel Dash \"Beto\"    Relationship to patient: Self    Best call back number: [phone]    Type of visit: LUMBAR EPIDURAL AND RT HIP INJECTION    Requested date: ASAP      "

## 2023-08-17 ENCOUNTER — OFFICE VISIT (OUTPATIENT)
Dept: PAIN MEDICINE | Facility: CLINIC | Age: 78
End: 2023-08-17
Payer: MEDICARE

## 2023-08-17 ENCOUNTER — PREP FOR SURGERY (OUTPATIENT)
Dept: SURGERY | Facility: SURGERY CENTER | Age: 78
End: 2023-08-17
Payer: MEDICARE

## 2023-08-17 VITALS
SYSTOLIC BLOOD PRESSURE: 128 MMHG | TEMPERATURE: 97.7 F | OXYGEN SATURATION: 95 % | HEIGHT: 69 IN | BODY MASS INDEX: 35.93 KG/M2 | HEART RATE: 57 BPM | WEIGHT: 242.6 LBS | RESPIRATION RATE: 18 BRPM | DIASTOLIC BLOOD PRESSURE: 60 MMHG

## 2023-08-17 DIAGNOSIS — M54.16 LUMBAR RADICULOPATHY: Primary | ICD-10-CM

## 2023-08-17 DIAGNOSIS — M70.61 GREATER TROCHANTERIC BURSITIS OF RIGHT HIP: ICD-10-CM

## 2023-08-17 DIAGNOSIS — M47.816 LUMBAR FACET ARTHROPATHY: ICD-10-CM

## 2023-08-17 DIAGNOSIS — M51.36 DDD (DEGENERATIVE DISC DISEASE), LUMBAR: ICD-10-CM

## 2023-08-17 NOTE — PROGRESS NOTES
CHIEF COMPLAINT  Follow-up for back pain.    Subjective   Danyel Dash is a 77 y.o. male  who presents for follow-up.  He has a history of low back, right hip and thigh pain.  The patient is noted recrudescence of symptoms which is primarily affecting the right side of the lumbosacral spine rating to the right hip/groin and into the lateral aspect of the right thigh terminating proximal to the knee.  Patient states that he has been extremely active over the summer and has noted increased pain and is ready to proceed with injective therapy.    Pain today 3/10 VAS in severity.      Back Pain  This is a chronic problem. The current episode started more than 1 year ago. The problem occurs constantly. The problem is unchanged. The pain is present in the lumbar spine. The quality of the pain is described as aching and burning (THROBBING). Radiates to: RIGHT HIP. The pain is at a severity of 5/10. The pain is moderate. The pain is The same all the time. The symptoms are aggravated by position, standing and lying down (WALKING). Pertinent negatives include no numbness or weakness.      PEG Assessment   What number best describes your pain on average in the past week?6  What number best describes how, during the past week, pain has interfered with your enjoyment of life?7  What number best describes how, during the past week, pain has interfered with your general activity?  0    Review of Pertinent Medical Data ---            The following portions of the patient's history were reviewed and updated as appropriate: allergies, current medications, past family history, past medical history, past social history, past surgical history, and problem list.    Review of Systems   Gastrointestinal:  Negative for constipation and diarrhea.   Genitourinary:  Negative for difficulty urinating.   Musculoskeletal:  Positive for back pain.   Neurological:  Negative for weakness and numbness.   Psychiatric/Behavioral:  Negative for sleep  "disturbance and suicidal ideas. The patient is not nervous/anxious.    I have reviewed and confirmed the accuracy of the ROS as documented by the MA/LPN/RN EH Diez  Vitals:    08/17/23 1116   BP: 128/60   Pulse: 57   Resp: 18   Temp: 97.7 øF (36.5 øC)   SpO2: 95%   Weight: 110 kg (242 lb 9.6 oz)   Height: 175.3 cm (69\")   PainSc:   5   PainLoc: Back         Objective   Physical Exam  Vitals and nursing note reviewed.   Constitutional:       Appearance: Normal appearance. He is obese.   HENT:      Head: Normocephalic.   Pulmonary:      Effort: Pulmonary effort is normal.   Musculoskeletal:      Lumbar back: Tenderness present. Decreased range of motion (INCREASED PAIN WITH FLEXION). Positive right straight leg raise test.        Back:    Skin:     General: Skin is warm and dry.   Neurological:      General: No focal deficit present.      Mental Status: He is alert and oriented to person, place, and time.      Cranial Nerves: Cranial nerves 2-12 are intact.      Sensory: Sensation is intact.      Motor: Motor function is intact.      Gait: Gait is intact.   Psychiatric:         Mood and Affect: Mood normal.         Behavior: Behavior normal.         Thought Content: Thought content normal.         Judgment: Judgment normal.           Assessment & Plan   Diagnoses and all orders for this visit:    1. Lumbar radiculopathy (Primary)    2. DDD (degenerative disc disease), lumbar    3. Greater trochanteric bursitis of right hip    4. Lumbar facet arthropathy        Danyel Dash reports a pain score of 5.  Given his pain assessment as noted, treatment options were discussed and the following options were decided upon as a follow-up plan to address the patient's pain: continuation of current treatment plan for pain, patient not interested in pharmacological measures, steroid injections, and use of non-medical modalities (ice, heat, stretching and/or behavior modifications).      --- Based on recrudescence of " symptoms I will have the patient return for therapeutic L4-5 LESI with right greater trochanteric bursa injection.  The risk and benefits of the procedure been reviewed with the patient and he does not have any questions to address.  --- Although up 6 weeks after injective therapy        ---  Indications for epidural injection:  Plan is to proceed with epidural at the appropriate level.  If the patient receives significant pain reduction and improvement in function and the plan will be to repeat the epidural when the pain worsens.  If a second epidural provides at least 6 weeks of sustained improvement that includes both pain reduction and improvement in function then an epidural injection could be repeated once again at the same level.  This is a mutual decision between the clinician and the patient that includes discussions including risks and benefits in detail as well as alternative therapies.  Patient's questions were answered to their satisfaction and to their understanding.  ---                Dictated utilizing Dragon dictation.

## 2023-09-19 DIAGNOSIS — E11.9 TYPE 2 DIABETES MELLITUS WITHOUT COMPLICATION, WITHOUT LONG-TERM CURRENT USE OF INSULIN: ICD-10-CM

## 2023-09-19 DIAGNOSIS — E78.01 FAMILIAL HYPERCHOLESTEROLEMIA: ICD-10-CM

## 2023-09-19 DIAGNOSIS — I10 ESSENTIAL HYPERTENSION: ICD-10-CM

## 2023-09-19 DIAGNOSIS — F32.1 CURRENT MODERATE EPISODE OF MAJOR DEPRESSIVE DISORDER, UNSPECIFIED WHETHER RECURRENT: ICD-10-CM

## 2023-09-19 DIAGNOSIS — F41.9 ANXIETY: ICD-10-CM

## 2023-09-19 NOTE — TELEPHONE ENCOUNTER
"PATIENT LOST HIS MEDICATIONS AND WILL NEED TO HAVE THEM CALLED IN TO PHARMACY AS A \"LOST\" OR \"EMERGENCY\" FILL   Caller: EktaDanyel \"Beto\"    Relationship: Self    Best call back number:     [phone] (Mobile)     Requested Prescriptions:   Requested Prescriptions     Pending Prescriptions Disp Refills    amLODIPine (NORVASC) 10 MG tablet 90 tablet 3     Sig: Take 1 tablet by mouth Daily.    buPROPion XL (WELLBUTRIN XL) 300 MG 24 hr tablet 90 tablet 3     Sig: Take 1 tablet by mouth Daily.    escitalopram (Lexapro) 10 MG tablet 90 tablet 3     Sig: Take 1 tablet by mouth Every Morning.    hydrOXYzine (ATARAX) 25 MG tablet 90 tablet 3     Sig: Take 1 tablet by mouth 3 (Three) Times a Day As Needed for Anxiety. for anxiety    lisinopril (PRINIVIL,ZESTRIL) 10 MG tablet 90 tablet 3     Sig: Take 1 tablet by mouth Daily.    pantoprazole (PROTONIX) 40 MG EC tablet 90 tablet 3     Sig: Take 1 tablet by mouth Daily.    pioglitazone (ACTOS) 45 MG tablet 90 tablet 3     Sig: Take 1 tablet by mouth Daily.    simvastatin (ZOCOR) 20 MG tablet 90 tablet 3     Sig: Take 1 tablet by mouth Daily.    valACYclovir (VALTREX) 500 MG tablet 30 tablet 2     Sig: Take 1 tablet by mouth Daily.        Pharmacy where request should be sent: [id number] [address]  AT [address]  [phone] [facility] [phone]      Last office visit with prescribing clinician: 12/16/2022   Last telemedicine visit with prescribing clinician: Visit date not found   Next office visit with prescribing clinician: Visit date not found     Additional details provided by patient:     Does the patient have less than a 3 day supply:  [x] Yes  [] No    Would you like a call back once the refill request has been completed: [] Yes [] No    If the office needs to give you a call back, can they leave a voicemail: [] Yes [] No    Alexis Vazquez   09/19/23 09:22 EDT         "

## 2023-09-19 NOTE — H&P
[facility]   HISTORY AND PHYSICAL    Patient Name: Danyel Dash  : 1945  MRN: [id number]  Primary Care Physician:  Agatha Charlton MD  Date of admission: 2023    Subjective   Subjective     Chief Complaint: Low back pain    History of Present Illness  Chronic low back pain that radiates into the right lower extremity and into the right hip.    Review of Systems   Constitutional:  Negative for chills and fever.   Respiratory:  Negative for cough and shortness of breath.    Musculoskeletal:  Positive for back pain.      Personal History     Past Medical History:   Diagnosis Date    Anemia     Anxiety     Arthritis     Chronic kidney disease     CKD stage 3    Coronary artery disease     rheumatic fever at age 7    Diabetes mellitus     GERD (gastroesophageal reflux disease)     GI bleed     Hyperlipidemia     Hypertension     Low back pain     Peripheral neuropathy     Rheumatic fever     Rheumatic fever 12/3/2021    Pt states that his heart is only slightly enlarged and he spent several years in the  and has had a very active life.    Spinal stenosis, lumbar     Tear of medial meniscus of knee 2020    Unspecified hypertensive kidney disease with chronic kidney disease stage I through stage IV, or unspecified 3/30/2022    Last Assessment & Plan:  Formatting of this note might be different from the original. BP controlled on norvasc & ACEi; no changes today       Past Surgical History:   Procedure Laterality Date    CATARACT EXTRACTION Bilateral     CHOLECYSTECTOMY      CHOLECYSTECTOMY WITH INTRAOPERATIVE CHOLANGIOGRAM N/A 2021    Procedure: CHOLECYSTECTOMY LAPAROSCOPIC;  Surgeon: Didi Telles MD;  Location: [facility];  Service: General;  Laterality: N/A;    COLONOSCOPY N/A 2016    tics, IH, polyps    ENDOSCOPY N/A 2016    LA Grade B reflux esophagitis, erythematous mucosa in stomach    ENDOSCOPY N/A 2017    Two jejunal AVM's     ENTEROSCOPY SMALL  BOWEL  07/07/2017    Procedure: ENTEROSCOPY SMALL BOWEL;  Surgeon: Anil Prakash MD;  Location: [facility] ENDOSCOPY;  Service:     EPIDURAL BLOCK      GALLBLADDER SURGERY N/A 02/2022    KNEE ARTHROPLASTY, PARTIAL REPLACEMENT Left 02/01/2021    KNEE ARTHROSCOPY Right     LUMBAR EPIDURAL INJECTION N/A 4/7/2023    Procedure: LUMBAR EPIDURAL 1ST VISIT;  Surgeon: Shania Warner MD;  Location: [facility];  Service: Pain Management;  Laterality: N/A;    SHOULDER ARTHROSCOPY Right     STEROID INJECTION HIP Right 4/7/2023    Procedure: GREATER TROCHANTERIC BURSA INJECTION RIGHT TO BE PERFORMED WITH LESI;  Surgeon: Shania Warner MD;  Location: [facility];  Service: Pain Management;  Laterality: Right;    TONSILLECTOMY         Family History: family history includes Alzheimer's disease in his mother; Diverticulitis in his father; Diverticulosis in his father; No Known Problems in his sister. Otherwise pertinent FHx was reviewed and not pertinent to current issue.    Social History:  reports that he quit smoking about 23 years ago. His smoking use included cigarettes. He started smoking about 63 years ago. He has a 27.00 pack-year smoking history. He has been exposed to tobacco smoke. He has quit using smokeless tobacco. He reports that he does not drink alcohol and does not use drugs.    Home Medications:  Blood Gluc Meter Disp-Strips, Diclofenac Sodium, Vitamin D-3, acetaminophen, albuterol, amLODIPine, buPROPion XL, diphenhydrAMINE-acetaminophen, escitalopram, ferrous gluconate, fluticasone, hydrOXYzine, lisinopril, loratadine, pantoprazole, pioglitazone, simvastatin, valACYclovir, vitamin B-12, and vitamin C    Allergies:  No Known Allergies    Objective    Objective     Vitals:        Physical Exam  Constitutional:       General: He is not in acute distress.  Pulmonary:      Effort: Pulmonary effort is normal. No respiratory distress.   Skin:     General: Skin is warm and dry.   Neurological:      Mental Status: He  is alert.   Psychiatric:         Mood and Affect: Mood normal.         Thought Content: Thought content normal.       Result Review    Result Review:  I have personally reviewed the results from the time of this admission to 9/20/2023 10:26 EDT and agree with these findings:  []  Laboratory list / accordion  []  Microbiology  []  Radiology  []  EKG/Telemetry   []  Cardiology/Vascular   []  Pathology  []  Old records  []  Other:  Most notable findings include: No new      Assessment & Plan   Assessment / Plan     Brief Patient Summary:  Danyel Dash is a 77 y.o. male who has chronic low back pain that radiates into the right lower extremity and right hip.    Active Hospital Problems:  Active Hospital Problems    Diagnosis     **Lumbar radiculopathy      Plan: L4-5 epidural steroid injection and right trochanteric bursa injection      DVT prophylaxis:  No DVT prophylaxis order currently exists.      Shania Warner MD

## 2023-09-19 NOTE — TELEPHONE ENCOUNTER
Rx Refill Note  Requested Prescriptions     Pending Prescriptions Disp Refills    amLODIPine (NORVASC) 10 MG tablet 90 tablet 3     Sig: Take 1 tablet by mouth Daily.    buPROPion XL (WELLBUTRIN XL) 300 MG 24 hr tablet 90 tablet 3     Sig: Take 1 tablet by mouth Daily.    escitalopram (Lexapro) 10 MG tablet 90 tablet 3     Sig: Take 1 tablet by mouth Every Morning.    hydrOXYzine (ATARAX) 25 MG tablet 90 tablet 3     Sig: Take 1 tablet by mouth 3 (Three) Times a Day As Needed for Anxiety. for anxiety    lisinopril (PRINIVIL,ZESTRIL) 10 MG tablet 90 tablet 3     Sig: Take 1 tablet by mouth Daily.    pantoprazole (PROTONIX) 40 MG EC tablet 90 tablet 3     Sig: Take 1 tablet by mouth Daily.    pioglitazone (ACTOS) 45 MG tablet 90 tablet 3     Sig: Take 1 tablet by mouth Daily.    simvastatin (ZOCOR) 20 MG tablet 90 tablet 3     Sig: Take 1 tablet by mouth Daily.    valACYclovir (VALTREX) 500 MG tablet 30 tablet 2     Sig: Take 1 tablet by mouth Daily.      Last office visit with prescribing clinician: 12/16/2022   Last telemedicine visit with prescribing clinician: Visit date not found   Next office visit with prescribing clinician: Visit date not found                         Would you like a call back once the refill request has been completed: [] Yes [] No    If the office needs to give you a call back, can they leave a voicemail: [] Yes [] No    Deonte Guo CMA/LMR  09/19/23, 09:35 EDT

## 2023-09-19 NOTE — DISCHARGE INSTRUCTIONS
[facility] - Post-procedure Instructions          --  While there are no absolute restrictions, it is recommended that you do not perform strenuous activity today. In the morning, you may resume your level of activity as before your block.    --  If you have a band-aid at your injection site, please remove it later today. Observe the area for any redness, swelling, pus-like drainage, or a temperature over 101°. If any of these symptoms occur, please call your doctor at [phone]. If after office hours, leave a message and the on-call provider will return your call.    --  Ice may be applied to your injection site. It is recommended you avoid direct heat (heating pad; hot tub) for 1-2 days.    --  Call [facility] at [phone] if you experience persistent headache, persistent bleeding from the injection site, or severe pain not relieved by heat or oral medication.    --  Do not make important decisions today.    --  Due to the effects of the block and/or the I.V. Sedation, DO NOT drive or operate hazardous machinery for 12 hours.  Local anesthetics may cause numbness after procedure and precautions must be taken with regards to operating equipment as well as with walking, even if ambulating with assistance of another person or with an assistive device.    --  Do not drink alcohol for 12 hours.    -- You may return to work tomorrow, or as directed by your referring doctor.    --  Occasionally you may notice a slight increase in your pain after the procedure. This should start to improve within the next 24-48 hours. Radiofrequency ablation procedure pain may last 3-4 weeks.    --  It may take as long as 3-4 days before you notice a gradual improvement in your pain and/or other symptoms.    -- You may continue to take your prescribed pain medication as needed.    --  Some normal possible side effects of steroid use could include fluid retention, increased blood sugar, dull headache,  increased sweating, increased appetite, mood swings and flushing.    --  Diabetics are recommended to watch their blood glucose level closely for 24-48 hours after the injection.    --  Must stay in PACU for 20 min upon arrival and prove no leg weakness before being discharged.    --  IN THE EVENT OF A LIFE THREATENING EMERGENCY, (CHEST PAIN, BREATHING DIFFICULTIES, PARALYSIS…) YOU SHOULD GO TO YOUR NEAREST EMERGENCY ROOM.    --  You should be contacted by our office within 2-3 days to schedule follow up or next appointment date.  If not contacted within 7 days, please call the office at [phone]

## 2023-09-20 ENCOUNTER — HOSPITAL ENCOUNTER (OUTPATIENT)
Dept: GENERAL RADIOLOGY | Facility: SURGERY CENTER | Age: 78
Setting detail: HOSPITAL OUTPATIENT SURGERY
End: 2023-09-20
Payer: MEDICARE

## 2023-09-20 ENCOUNTER — HOSPITAL ENCOUNTER (OUTPATIENT)
Facility: SURGERY CENTER | Age: 78
Setting detail: HOSPITAL OUTPATIENT SURGERY
Discharge: HOME | End: 2023-09-20
Attending: ANESTHESIOLOGY | Admitting: ANESTHESIOLOGY
Payer: MEDICARE

## 2023-09-20 ENCOUNTER — TRANSCRIBE ORDERS (OUTPATIENT)
Dept: SURGERY | Facility: SURGERY CENTER | Age: 78
End: 2023-09-20
Payer: MEDICARE

## 2023-09-20 VITALS
SYSTOLIC BLOOD PRESSURE: 160 MMHG | RESPIRATION RATE: 16 BRPM | WEIGHT: 244 LBS | BODY MASS INDEX: 36.14 KG/M2 | DIASTOLIC BLOOD PRESSURE: 73 MMHG | HEIGHT: 69 IN | OXYGEN SATURATION: 96 % | HEART RATE: 68 BPM | TEMPERATURE: 97.8 F

## 2023-09-20 DIAGNOSIS — Z41.9 SURGERY, ELECTIVE: Primary | ICD-10-CM

## 2023-09-20 DIAGNOSIS — M54.16 LUMBAR RADICULOPATHY: ICD-10-CM

## 2023-09-20 DIAGNOSIS — Z41.9 SURGERY, ELECTIVE: ICD-10-CM

## 2023-09-20 PROCEDURE — 25010000002 DEXAMETHASONE SODIUM PHOSPHATE 100 MG/10ML SOLUTION 10 ML VIAL: Performed by: ANESTHESIOLOGY

## 2023-09-20 PROCEDURE — 25010000002 BUPIVACAINE (PF) 0.25 % SOLUTION 10 ML VIAL: Performed by: ANESTHESIOLOGY

## 2023-09-20 PROCEDURE — 20610 DRAIN/INJ JOINT/BURSA W/O US: CPT | Performed by: ANESTHESIOLOGY

## 2023-09-20 PROCEDURE — 77002 NEEDLE LOCALIZATION BY XRAY: CPT

## 2023-09-20 PROCEDURE — 25510000001 IOPAMIDOL 61 % SOLUTION 30 ML VIAL: Performed by: ANESTHESIOLOGY

## 2023-09-20 PROCEDURE — 62323 NJX INTERLAMINAR LMBR/SAC: CPT | Performed by: ANESTHESIOLOGY

## 2023-09-20 RX ORDER — PIOGLITAZONEHYDROCHLORIDE 45 MG/1
45 TABLET ORAL DAILY
Qty: 90 TABLET | Refills: 3 | Status: SHIPPED | OUTPATIENT
Start: 2023-09-20

## 2023-09-20 RX ORDER — BUPROPION HYDROCHLORIDE 300 MG/1
300 TABLET ORAL DAILY
Qty: 90 TABLET | Refills: 3 | Status: SHIPPED | OUTPATIENT
Start: 2023-09-20

## 2023-09-20 RX ORDER — SIMVASTATIN 20 MG
20 TABLET ORAL DAILY
Qty: 90 TABLET | Refills: 3 | Status: SHIPPED | OUTPATIENT
Start: 2023-09-20

## 2023-09-20 RX ORDER — VALACYCLOVIR HYDROCHLORIDE 500 MG/1
500 TABLET, FILM COATED ORAL DAILY
Qty: 30 TABLET | Refills: 2 | Status: SHIPPED | OUTPATIENT
Start: 2023-09-20

## 2023-09-20 RX ORDER — LISINOPRIL 10 MG/1
10 TABLET ORAL DAILY
Qty: 90 TABLET | Refills: 3 | Status: SHIPPED | OUTPATIENT
Start: 2023-09-20

## 2023-09-20 RX ORDER — ESCITALOPRAM OXALATE 10 MG/1
10 TABLET ORAL EVERY MORNING
Qty: 90 TABLET | Refills: 3 | Status: SHIPPED | OUTPATIENT
Start: 2023-09-20

## 2023-09-20 RX ORDER — AMLODIPINE BESYLATE 10 MG/1
10 TABLET ORAL DAILY
Qty: 90 TABLET | Refills: 3 | Status: SHIPPED | OUTPATIENT
Start: 2023-09-20

## 2023-09-20 RX ORDER — HYDROXYZINE HYDROCHLORIDE 25 MG/1
25 TABLET, FILM COATED ORAL 3 TIMES DAILY PRN
Qty: 90 TABLET | Refills: 3 | Status: SHIPPED | OUTPATIENT
Start: 2023-09-20

## 2023-09-20 RX ORDER — PANTOPRAZOLE SODIUM 40 MG/1
40 TABLET, DELAYED RELEASE ORAL DAILY
Qty: 90 TABLET | Refills: 3 | Status: SHIPPED | OUTPATIENT
Start: 2023-09-20

## 2023-09-20 NOTE — OP NOTE
L4/L5 Interlaminar Lumbar Epidural Steroid Injection and Right Trochanteric Bursa Injection  [facility]     PREOPERATIVE DIAGNOSIS:   Lumbar Radiculopathy and Right trochanteric bursitis  POSTOPERATIVE DIAGNOSIS:  Same as preop diagnosis     PROCEDURE:   Lumbar Epidural Steroid Injection, Therapeutic Interlaminar Injection, with epidurogram, at  L4/L5 level     PRE-PROCEDURE DISCUSSION WITH PATIENT:    Risks and complications were discussed with the patient prior to starting the procedure and informed consent was obtained.  We discussed various topics including but not limited to bleeding, infection, injury, paralysis, nerve injury, dural puncture, coma, death, worsening of clinical picture, lack of pain relief, and postprocedural soreness.     SURGEON:  Shania Warner MD     REASON FOR PROCEDURE:    Previous clinically significant therapeutic effect is noted. and tenderness of the right trochanteric bursa     SEDATION:     No sedation was used for this procedure  ANESTHETIC:  Marcaine 0.25%  STEROID:   10mg dexamethasone (7.5mg for epidural and 2.5mg for bursa)     DESCRIPTON OF PROCEDURE:     After obtaining informed consent, I.V. was not started in the preop area.   The patient was taken to the operating room and placed in the prone position.  EKG, blood pressure, and pulse oximeter were monitored throughout, and sedation was provided as needed by the RN under my guidance. All pressure points were well padded.  The lumbar spine area was prepped with Chloraprep and draped in a sterile fashion.       AP fluoroscopic image was used to visualize the L4/L5 interspace.  The skin and subcutaneous tissue over the area was anesthetized with 1% Lidocaine.  An 18-Gauge Tuohy needle was then advanced through the anesthetized skin tract under fluoroscopic guidance in a coaxial view using a loss of resistance technique.  Lateral fluoroscopy was used to verify appropriate needle depth.  Once the needle tip  was felt to be in the posterior epidural space, aspiration was noted to be negative for blood or CSF.  A volume of 0.5mL of Isovue was then injected under live fluoroscopy in an AP view which produced good epidural spread with no evidence of loculation, vascular run-off or intrathecal spread.  Subsequently, a total volume of 5mL consisting of 10mg of dexamethasone, 5mL of 0.25% bupivcacaine and normal saline was injected without resistance.  The needle was removed intact.      The RIGHT  greater trochanter was identified fluoroscopically.  The point of maximum tenderness at the site was identified via palpation.  The skin and subcutaneous tissue overlying the area was anesthetized with 1% Lidocaine.  A 22-gauge spinal needle was then advanced percutaneously through the anesthetized skin tract under fluoroscopic guidance to contact periosteum at the target site.  After negative aspiration, a volume of 5mL consisting of 2.5mg of Dexamethasone mixed with 4.75mL of 0.25% Bupivacaine was injected.        ESTIMATED BLOOD LOSS:  <5 mL  SPECIMENS:  None     COMPLICATIONS:     No complications were noted., There was no indication of vascular uptake on live injection of contrast dye. and There was no indication of intrathecal uptake on live injection of contrast dye.     TOLERANCE & DISCHARGE CONDITION:    The patient tolerated the procedure well.  The patient was transported to the recovery area without difficulties.  The patient was discharged to home under the care of family in stable and satisfactory condition.     PLAN OF CARE:  The patient was given our standard instruction sheet.  The patient will Return to clinic 4-6 wks  The patient will resume all medications as per the medication reconciliation sheet.

## 2023-10-23 ENCOUNTER — OFFICE VISIT (OUTPATIENT)
Dept: PAIN MEDICINE | Facility: CLINIC | Age: 78
End: 2023-10-23
Payer: MEDICARE

## 2023-10-23 VITALS
BODY MASS INDEX: 35.4 KG/M2 | WEIGHT: 239 LBS | TEMPERATURE: 98.1 F | SYSTOLIC BLOOD PRESSURE: 144 MMHG | RESPIRATION RATE: 20 BRPM | OXYGEN SATURATION: 95 % | DIASTOLIC BLOOD PRESSURE: 80 MMHG | HEART RATE: 77 BPM | HEIGHT: 69 IN

## 2023-10-23 DIAGNOSIS — M70.61 GREATER TROCHANTERIC BURSITIS OF RIGHT HIP: Primary | ICD-10-CM

## 2023-10-23 PROCEDURE — 99213 OFFICE O/P EST LOW 20 MIN: CPT | Performed by: PHYSICIAN ASSISTANT

## 2023-10-23 PROCEDURE — 3079F DIAST BP 80-89 MM HG: CPT | Performed by: PHYSICIAN ASSISTANT

## 2023-10-23 PROCEDURE — 3077F SYST BP >= 140 MM HG: CPT | Performed by: PHYSICIAN ASSISTANT

## 2023-10-23 PROCEDURE — 1125F AMNT PAIN NOTED PAIN PRSNT: CPT | Performed by: PHYSICIAN ASSISTANT

## 2023-10-23 PROCEDURE — 1159F MED LIST DOCD IN RCRD: CPT | Performed by: PHYSICIAN ASSISTANT

## 2023-10-23 PROCEDURE — 1160F RVW MEDS BY RX/DR IN RCRD: CPT | Performed by: PHYSICIAN ASSISTANT

## 2023-10-23 NOTE — PROGRESS NOTES
CHIEF COMPLAINT  INTRALAMINAR LUMBAR EPIDURAL STEROID INJECTION L4-5 W/ RIGHT PARAMEDIAN APPROACH AND RIGHT TROCHANTERIC BURSA INJECTION  9-20-23  Patient reports 80% pain relief with ongoing effect as of today for lumbar epidural while right bursa injection was somewhat effective.     Subjective   Danyel Dash is a 77 y.o. male  who presents for follow-up.  He has a history of low back, right hip and thigh pain.  This patient underwent L4-5 LESI with right paramedian approach and right trochanteric bursa injection on 9/20/2023 with Dr. Warner and reports 80% ongoing pain relief at this time.  Is extremely pleased to report significant improvement of right hip and groin pain radiating into the lateral aspect of the right thigh terminating proximal to the knee following LESI injection.  He feels that he is obtaining some relief from the bursa injection but continues to have persistent right-sided bursitis.    Pain today 2/10 VAS in severity.      Back Pain  This is a chronic problem. The current episode started more than 1 year ago. The problem occurs constantly. The problem is unchanged. The pain is present in the lumbar spine. The quality of the pain is described as aching and burning (THROBBING). Radiates to: RIGHT HIP. The pain is at a severity of 2/10. The pain is moderate. The pain is The same all the time. The symptoms are aggravated by position, standing and lying down (WALKING). Pertinent negatives include no abdominal pain, chest pain, dysuria, fever, headaches, numbness or weakness.        PEG Assessment   What number best describes your pain on average in the past week?5  What number best describes how, during the past week, pain has interfered with your enjoyment of life?4  What number best describes how, during the past week, pain has interfered with your general activity?  4    Review of Pertinent Medical Data ---  No new imaging to review on today.    The following portions of the patient's history  "were reviewed and updated as appropriate: allergies, current medications, past family history, past medical history, past social history, past surgical history, and problem list.    Review of Systems   Constitutional:  Negative for activity change (less), fatigue and fever.   HENT:  Negative for congestion.    Respiratory:  Negative for cough and chest tightness.    Cardiovascular:  Negative for chest pain.   Gastrointestinal:  Negative for abdominal pain, constipation and diarrhea.   Genitourinary:  Negative for difficulty urinating and dysuria.   Musculoskeletal:  Positive for back pain.   Neurological:  Negative for dizziness, weakness, light-headedness, numbness and headaches.   Psychiatric/Behavioral:  Negative for agitation, sleep disturbance and suicidal ideas. The patient is nervous/anxious.      I have reviewed and confirmed the accuracy of the ROS as documented by the MA/LPN/RN EH Dize  Vitals:    10/23/23 1057   BP: 144/80   BP Location: Left arm   Patient Position: Sitting   Cuff Size: Adult   Pulse: 77   Resp: 20   Temp: 98.1 °F (36.7 °C)   TempSrc: Temporal   SpO2: 95%   Weight: 108 kg (239 lb)   Height: 175.3 cm (69\")   PainSc:   2         Objective   Physical Exam  Vitals and nursing note reviewed.   Constitutional:       Appearance: Normal appearance. He is obese.   HENT:      Head: Normocephalic.   Pulmonary:      Effort: Pulmonary effort is normal.   Musculoskeletal:      Lumbar back: Tenderness present. Decreased range of motion (INCREASED PAIN WITH FLEXION).        Back:    Skin:     General: Skin is warm and dry.   Neurological:      General: No focal deficit present.      Mental Status: He is alert and oriented to person, place, and time.      Cranial Nerves: Cranial nerves 2-12 are intact.      Sensory: Sensation is intact.      Motor: Motor function is intact.      Gait: Gait is intact.   Psychiatric:         Mood and Affect: Mood normal.         Behavior: Behavior normal.         " Thought Content: Thought content normal.         Judgment: Judgment normal.             Assessment & Plan   Diagnoses and all orders for this visit:    1. Greater trochanteric bursitis of right hip (Primary)  -     Ibuprofen 3 %, Gabapentin 10 %, Baclofen 2 %, lidocaine 4 %, Ketamine HCl 4 %; Apply 1-2 g topically to the appropriate area as directed 3 (Three) to 4 (Four) times daily.  Dispense: 90 g; Refill: 5        Danyel KEZIA Ekta reports a pain score of 2.  Given his pain assessment as noted, treatment options were discussed and the following options were decided upon as a follow-up plan to address the patient's pain: continuation of current treatment plan for pain, prescription for non-opiod analgesics, and use of non-medical modalities (ice, heat, stretching and/or behavior modifications).      --- Follow-up in 3 months or sooner as needed  --- I will send over prescription for ketamine topical pain cream for patient to utilize over the right trochanteric bursa as well as over the lumbar spine as necessary for increased episodes of pain.                Dictated utilizing Dragon dictation.

## 2023-11-20 ENCOUNTER — OFFICE VISIT (OUTPATIENT)
Dept: FAMILY MEDICINE CLINIC | Facility: CLINIC | Age: 78
End: 2023-11-20
Payer: MEDICARE

## 2023-11-20 VITALS
SYSTOLIC BLOOD PRESSURE: 126 MMHG | HEIGHT: 69 IN | OXYGEN SATURATION: 96 % | WEIGHT: 233 LBS | DIASTOLIC BLOOD PRESSURE: 60 MMHG | BODY MASS INDEX: 34.51 KG/M2 | HEART RATE: 68 BPM | TEMPERATURE: 97.5 F

## 2023-11-20 DIAGNOSIS — M79.641 PAIN OF RIGHT HAND: ICD-10-CM

## 2023-11-20 DIAGNOSIS — E11.9 TYPE 2 DIABETES MELLITUS WITHOUT COMPLICATION, WITHOUT LONG-TERM CURRENT USE OF INSULIN: ICD-10-CM

## 2023-11-20 DIAGNOSIS — R41.3 MEMORY DEFICITS: Primary | ICD-10-CM

## 2023-11-20 NOTE — PROGRESS NOTES
"Chief Complaint  Hand Injury (Right hand jammed and left hand pain from a month ago ) and Memory Loss (Discuss short term memory issues )    Subjective        Danyel Dash presents to [facility]  History of Present Illness  78-year-old male with type 2 diabetes, CKD 3, depression/anxiety, hyperlipidemia, hypertension who presents for hand pain and memory difficulty.    1.  Short-term memory difficulty -continues to work on obtaining hearing aids as he knows that this is an issue. Notices issues with directions and getting easily mixed up with certain things.  He also notices he has to double check numbers more often than he previously did.  His mother had Alzheimer's disease in her 70s. He does not drink alcohol.  He does note having untreated sleep apnea.  Mood is well controlled for the most part.  He does take Tylenol PM occasionally as well as hydroxyzine once daily.    2.  Depression/anxiety -currently on Lexapro and Wellbutrin.  He is also taking hydroxyzine as needed which he states typically calms his nerves.  His adult daughter moved back in with them 3 years ago and has been dealing with long COVID symptoms which has been stressful.  No issues with insomnia.    3.  He he began experiencing right hand pain about 3 months ago when he was planting [place].  He states that he jammed that finger and has been experiencing pain ever since then.  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  He is also experiencing left pinky finger pain.  This is primarily only when he flexes it.      Objective   Vital Signs:  /60 (BP Location: Right arm, Patient Position: Sitting, Cuff Size: Adult)   Pulse 68   Temp 97.5 °F (36.4 °C) (Infrared)   Ht 175.3 cm (69.02\")   Wt 106 kg (233 lb)   SpO2 96%   BMI 34.39 kg/m²   Estimated body mass index is 34.39 kg/m² as calculated from the following:    Height as of this encounter: 175.3 cm (69.02\").    Weight as " of this encounter: 106 kg (233 lb).               Physical Exam  Constitutional:       General: He is not in acute distress.  Eyes:      Conjunctiva/sclera: Conjunctivae normal.   Cardiovascular:      Rate and Rhythm: Normal rate and regular rhythm.   Pulmonary:      Effort: Pulmonary effort is normal. No respiratory distress.      Breath sounds: Normal breath sounds.   Musculoskeletal:         General: No swelling, tenderness or deformity. Normal range of motion.   Skin:     General: Skin is warm and dry.   Neurological:      Mental Status: He is alert and oriented to person, place, and time.   Psychiatric:         Mood and Affect: Mood normal.         Behavior: Behavior normal.        Result Review :  The following data was reviewed by: Agatha Charlton MD on 11/20/2023:  Common labs          12/16/2022    10:09   Common Labs   Hemoglobin A1C 7.0      Data reviewed : none             Assessment and Plan   Diagnoses and all orders for this visit:    1. Memory deficits (Primary)  -     Comprehensive Metabolic Panel  -     ORDER: Hemoglobin A1c  -     Lipid Panel  -     TSH  -     CBC Auto Differential  -     Vitamin B12  -     Folate  -     Vitamin B1, Whole Blood    2. Type 2 diabetes mellitus without complication, without long-term current use of insulin  -     Comprehensive Metabolic Panel  -     ORDER: Hemoglobin A1c  -     Lipid Panel    3. Pain of right hand  -     XR Hand 3+ View Right (In Office)             Follow Up   Return in about 3 months (around 2/20/2024) for memory problems.  Patient was given instructions and counseling regarding his condition or for health maintenance advice. Please see specific information pulled into the AVS if appropriate.

## 2023-11-22 LAB
ALBUMIN SERPL-MCNC: 4.3 G/DL (ref 3.8–4.8)
ALBUMIN/GLOB SERPL: 2.2 {RATIO} (ref 1.2–2.2)
ALP SERPL-CCNC: 55 IU/L (ref 44–121)
ALT SERPL-CCNC: 9 IU/L (ref 0–44)
AST SERPL-CCNC: 14 IU/L (ref 0–40)
BASOPHILS # BLD AUTO: 0 X10E3/UL (ref 0–0.2)
BASOPHILS NFR BLD AUTO: 1 %
BILIRUB SERPL-MCNC: 0.6 MG/DL (ref 0–1.2)
BUN SERPL-MCNC: 16 MG/DL (ref 8–27)
BUN/CREAT SERPL: 12 (ref 10–24)
CALCIUM SERPL-MCNC: 9.4 MG/DL (ref 8.6–10.2)
CHLORIDE SERPL-SCNC: 103 MMOL/L (ref 96–106)
CHOLEST SERPL-MCNC: 145 MG/DL (ref 100–199)
CO2 SERPL-SCNC: 21 MMOL/L (ref 20–29)
CREAT SERPL-MCNC: 1.35 MG/DL (ref 0.76–1.27)
EGFRCR SERPLBLD CKD-EPI 2021: 54 ML/MIN/1.73
EOSINOPHIL # BLD AUTO: 0.1 X10E3/UL (ref 0–0.4)
EOSINOPHIL NFR BLD AUTO: 2 %
ERYTHROCYTE [DISTWIDTH] IN BLOOD BY AUTOMATED COUNT: 12.3 % (ref 11.6–15.4)
FOLATE SERPL-MCNC: 12.5 NG/ML
GLOBULIN SER CALC-MCNC: 2 G/DL (ref 1.5–4.5)
GLUCOSE SERPL-MCNC: ABNORMAL MG/DL
HBA1C MFR BLD: 6.3 % (ref 4.8–5.6)
HCT VFR BLD AUTO: 45.4 % (ref 37.5–51)
HDLC SERPL-MCNC: 35 MG/DL
HGB BLD-MCNC: 15.4 G/DL (ref 13–17.7)
IMM GRANULOCYTES # BLD AUTO: 0 X10E3/UL (ref 0–0.1)
IMM GRANULOCYTES NFR BLD AUTO: 0 %
LDLC SERPL CALC-MCNC: 88 MG/DL (ref 0–99)
LYMPHOCYTES # BLD AUTO: 0.9 X10E3/UL (ref 0.7–3.1)
LYMPHOCYTES NFR BLD AUTO: 16 %
MCH RBC QN AUTO: 31 PG (ref 26.6–33)
MCHC RBC AUTO-ENTMCNC: 33.9 G/DL (ref 31.5–35.7)
MCV RBC AUTO: 91 FL (ref 79–97)
MONOCYTES # BLD AUTO: 0.4 X10E3/UL (ref 0.1–0.9)
MONOCYTES NFR BLD AUTO: 6 %
NEUTROPHILS # BLD AUTO: 4.3 X10E3/UL (ref 1.4–7)
NEUTROPHILS NFR BLD AUTO: 75 %
PLATELET # BLD AUTO: 288 X10E3/UL (ref 150–450)
POTASSIUM SERPL-SCNC: ABNORMAL MMOL/L
PROT SERPL-MCNC: 6.3 G/DL (ref 6–8.5)
RBC # BLD AUTO: 4.97 X10E6/UL (ref 4.14–5.8)
SODIUM SERPL-SCNC: 141 MMOL/L (ref 134–144)
TRIGL SERPL-MCNC: 124 MG/DL (ref 0–149)
TSH SERPL DL<=0.005 MIU/L-ACNC: 1.03 UIU/ML (ref 0.45–4.5)
VIT B1 BLD-SCNC: 121.9 NMOL/L (ref 66.5–200)
VIT B12 SERPL-MCNC: 811 PG/ML (ref 232–1245)
VLDLC SERPL CALC-MCNC: 22 MG/DL (ref 5–40)
WBC # BLD AUTO: 5.8 X10E3/UL (ref 3.4–10.8)

## 2023-11-22 NOTE — PROGRESS NOTES
Please let him know hand xray shows mild arthritis. Would recommend topical antiinflammatories such as Voltaren gel.

## 2023-11-27 NOTE — PROGRESS NOTES
Please let him know labs show:     1. Decreased kidney function. This can be related to dehydration.     2. Improvement in A1c to 6.3%. Goal is less than 7%    3. Cholesterol looks great and is at goal. Blood counts and all vitamin levels are normal.     I would recommend avoiding dehydration and all NSAIDs such as aleve, motrin, naproxen, ibuprofen. We will recheck kidney function at your next appt.

## 2023-12-22 ENCOUNTER — TELEPHONE (OUTPATIENT)
Dept: FAMILY MEDICINE CLINIC | Facility: CLINIC | Age: 78
End: 2023-12-22
Payer: MEDICARE

## 2023-12-22 NOTE — LETTER
"2023     Danyel Dash \"Beto\"  [address] [place] [address]    Patient: Danyel Dash   YOB: 1945           Current Medications as of 2023      Outpatient Medications     Quantity Refills Start End   acetaminophen (TYLENOL) 325 MG tablet -- --  --   Sig:   Take 2 tablets by mouth Every 6 (Six) Hours As Needed for Mild Pain.     Route:   Oral     PRN Reason(s):   Mild Pain     Class:   Historical Med     albuterol (PROAIR RESPICLICK) 108 (90 Base) MCG/ACT inhaler -- --  --   Sig:   Inhale 2 puffs.     Route:   Inhalation     Class:   Historical Med     amLODIPine (NORVASC) 10 MG tablet 90 tablet 3 2023 --   Sig:   Take 1 tablet by mouth Daily.     Route:   Oral     Blood Gluc Meter Disp-Strips device 1 each 0 6/10/2016 --   Sig:   Pt to monitor blood glucose two times daily     Route:   (none)     Note to Pharmacy:   Please provide patient with Device, strips and lancets that his insurance will cover. Or have patient call his insurance and provide this office with the name of the covered device/strips/lancets.     buPROPion XL (WELLBUTRIN XL) 300 MG 24 hr tablet 90 tablet 3 2023 --   Sig:   Take 1 tablet by mouth Daily.     Route:   Oral     Cholecalciferol (VITAMIN D-3) 1000 UNITS capsule -- -- 2013 --   Sig:   Take 1,000 Units by mouth Daily.     Route:   Oral     Class:   Historical Med     Diclofenac Sodium (VOLTAREN) 1 % gel gel -- -- 2021 --   Route:   (none)     Class:   Historical Med     diphenhydrAMINE-acetaminophen (TYLENOL PM)  MG tablet per tablet -- --  --   Sig:   Take 1 tablet by mouth As Needed.     Route:   Oral     Class:   Historical Med     escitalopram (Lexapro) 10 MG tablet 90 tablet 3 2023 --   Sig:   Take 1 tablet by mouth Every Morning.     Route:   Oral     ferrous gluconate (FERGON) 240 (27 FE) MG tablet -- -- 2021 --   Si tablets.     Route:   (none)     Class:   Historical Med     fluticasone " (Flonase) 50 MCG/ACT nasal spray 15.8 mL 3 3/13/2023 --   Si sprays into the nostril(s) as directed by provider Daily.     Route:   Nasal     hydrOXYzine (ATARAX) 25 MG tablet 90 tablet 3 2023 --   Sig:   Take 1 tablet by mouth 3 (Three) Times a Day As Needed for Anxiety. for anxiety     Route:   Oral     PRN Reason(s):   Anxiety     Ibuprofen 3 %, Gabapentin 10 %, Baclofen 2 %, lidocaine 4 %, Ketamine HCl 4 % 90 g 5 10/23/2023 10/22/2024   Sig:   Apply 1-2 g topically to the appropriate area as directed 3 (Three) to 4 (Four) times daily.     Route:   Topical     Note to Pharmacy:   May substitute alternative formula as coverage requires, Ketamine 4%, Ibuprofen 3%, Baclofen 2%, Gabapentin 10%, Lidocaine 4%        lisinopril (PRINIVIL,ZESTRIL) 10 MG tablet 90 tablet 3 2023 --   Sig:   Take 1 tablet by mouth Daily.     Route:   Oral     loratadine (CLARITIN) 10 MG tablet -- -- 2021 --   Si tablet.     Route:   (none)     Class:   Historical Med     pantoprazole (PROTONIX) 40 MG EC tablet 90 tablet 3 2023 --   Sig:   Take 1 tablet by mouth Daily.     Route:   Oral     pioglitazone (ACTOS) 45 MG tablet 90 tablet 3 2023 --   Sig:   Take 1 tablet by mouth Daily.     Route:   Oral     simvastatin (ZOCOR) 20 MG tablet 90 tablet 3 2023 --   Sig:   Take 1 tablet by mouth Daily.     Route:   Oral     valACYclovir (VALTREX) 500 MG tablet 30 tablet 2 2023 --   Sig:   Take 1 tablet by mouth Daily.     Route:   Oral     vitamin B-12 (CYANOCOBALAMIN) 1000 MCG tablet -- --  --   Sig:   Take 1 tablet by mouth Daily.     Route:   Oral     Class:   Historical Med     vitamin C (ASCORBIC ACID) 500 MG tablet -- --  --   Sig:   Take 1 tablet by mouth Daily.     Route:   Oral     Class:   Historical Med

## 2023-12-22 NOTE — TELEPHONE ENCOUNTER
"     Caller: Danyel Dash \"Beto\"    Relationship: Self    Best call back number: [phone]     What form or medical record are you requesting:      Who is requesting this form or medical record from you:      How would you like to receive the form or medical records (pick-up, mail, fax):    If fax, what is the fax number:    If mail, what is the address:    If pick-up, provide patient with address and location details    Timeframe paperwork needed:      Additional notes:  PATIENT CALLED AND NEEDING A COPY OF HIS MEDICATION LIST AS SOON AS POSSIBLE.  HE IS WANTING TO PICK THIS UP TODAY OR TUESDAY, BUT HER WOULD PREFER TODAY.  PLEASE CALL HIM WHEN IT IS READY.             " Spoke to son, Stanton. He states family has decided on [facility]. Will place referral today.  Discussed in rounds, not ready for tx to LT[facility] at this time.

## 2024-02-21 ENCOUNTER — OFFICE VISIT (OUTPATIENT)
Dept: FAMILY MEDICINE CLINIC | Facility: CLINIC | Age: 79
End: 2024-02-21
Payer: MEDICARE

## 2024-02-21 VITALS
BODY MASS INDEX: 35.1 KG/M2 | SYSTOLIC BLOOD PRESSURE: 130 MMHG | HEIGHT: 69 IN | HEART RATE: 56 BPM | OXYGEN SATURATION: 98 % | TEMPERATURE: 97.1 F | DIASTOLIC BLOOD PRESSURE: 60 MMHG | WEIGHT: 237 LBS

## 2024-02-21 DIAGNOSIS — E11.9 TYPE 2 DIABETES MELLITUS WITHOUT COMPLICATION, WITHOUT LONG-TERM CURRENT USE OF INSULIN: Primary | ICD-10-CM

## 2024-02-21 DIAGNOSIS — M65.321 TRIGGER INDEX FINGER OF RIGHT HAND: ICD-10-CM

## 2024-02-21 DIAGNOSIS — R41.3 MEMORY DIFFICULTIES: ICD-10-CM

## 2024-02-21 LAB
EXPIRATION DATE: ABNORMAL
HBA1C MFR BLD: 6.1 % (ref 4.5–5.7)
Lab: ABNORMAL

## 2024-02-21 NOTE — PATIENT INSTRUCTIONS
[facility]  [address] , [place] [address] · [address]  [phone]

## 2024-02-21 NOTE — ASSESSMENT & PLAN NOTE
Today A1c is 6.1%. Down from 7%.  Goal A1c less than 7%  He is on ACE inhibitor and statin  Recommend yearly eye exams and daily foot exams.  Continue Actos 45 mg daily.

## 2024-02-22 NOTE — ASSESSMENT & PLAN NOTE
Differential diagnosis includes Alzheimer's disease (FHx in mother), small vessel disease, uncontrolled depression or sleep apnea.  We discussed multiple options for work-up including neuropsychological testing and sleep study.  I counseled him on a healthy diet and regular exercise to prevent worsening of small vessel disease.  Hypertension and diabetes is well controlled.  Discussed Neuropsych evaluation - information provided.

## 2024-02-22 NOTE — PROGRESS NOTES
"Chief Complaint  Memory Loss (Follow up/-short term memory issues currently ), Diabetes (A1c recheck ), and Hand Pain (Right hand index finger disjointed and requested referral for hand specialist for him and his wife )    Subjective        Danyel Dash presents to [facility]  History of Present Illness  78-year-old male with type 2 diabetes, CKD 3, depression/anxiety, hyperlipidemia, hypertension who presents for T2DM follow up, hand pain and memory difficulty.    1.  Short-term memory difficulty - Notices issues with directions and getting easily mixed up with certain things.  He also notices he has to double check numbers more often than he previously did.  His mother had Alzheimer's disease in her 70s. He does not drink alcohol.  He does note having untreated sleep apnea.  Mood is well controlled for the most part.  He does take Tylenol PM occasionally as well as hydroxyzine once daily. Has new hearing aids. No neuropsych testing, MRI Head.    2. Trigger finger - right index finger for about 6 months. Started when he was planting tulips.  He states that he jammed that finger and has been experiencing pain ever since then.  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  Xray with mild osteoarthritis.    3. DM2 - much improved today. A1c 7% at last appt but he had been out of actos. Restarted without issue.       Objective   Vital Signs:  /60 (BP Location: Right arm, Patient Position: Sitting, Cuff Size: Adult)   Pulse 56   Temp 97.1 °F (36.2 °C) (Infrared)   Ht 175.3 cm (69.02\")   Wt 108 kg (237 lb)   SpO2 98%   BMI 34.98 kg/m²   Estimated body mass index is 34.98 kg/m² as calculated from the following:    Height as of this encounter: 175.3 cm (69.02\").    Weight as of this encounter: 108 kg (237 lb).       BMI is >= 30 and <35. (Class 1 Obesity). The following options were offered after discussion;: exercise counseling/recommendations and " nutrition counseling/recommendations      Physical Exam  Constitutional:       General: He is not in acute distress.  Eyes:      Conjunctiva/sclera: Conjunctivae normal.   Cardiovascular:      Rate and Rhythm: Normal rate and regular rhythm.   Pulmonary:      Effort: Pulmonary effort is normal. No respiratory distress.      Breath sounds: Normal breath sounds.   Musculoskeletal:      Comments: Palpable tendon cord of right index finger.    Skin:     General: Skin is warm and dry.   Neurological:      Mental Status: He is alert and oriented to person, place, and time.   Psychiatric:         Mood and Affect: Mood normal.         Behavior: Behavior normal.        Result Review :    The following data was reviewed by: Agatha Charlton MD on 02/21/2024:  Common labs          11/20/2023    10:03 2/21/2024    11:50   Common Labs   Glucose CANCELED     BUN 16     Creatinine 1.35     Sodium 141     Potassium CANCELED     Chloride 103     Calcium 9.4     Total Protein 6.3     Albumin 4.3     Total Bilirubin 0.6     Alkaline Phosphatase 55     AST (SGOT) 14     ALT (SGPT) 9     WBC 5.8     Hemoglobin 15.4     Hematocrit 45.4     Platelets 288     Total Cholesterol 145     Triglycerides 124     HDL Cholesterol 35     LDL Cholesterol  88     Hemoglobin A1C 6.3  6.1      Data reviewed : none             Assessment and Plan     Diagnoses and all orders for this visit:    1. Type 2 diabetes mellitus without complication, without long-term current use of insulin (Primary)  Assessment & Plan:  Today A1c is 6.1%. Down from 7%.  Goal A1c less than 7%  He is on ACE inhibitor and statin  Recommend yearly eye exams and daily foot exams.  Continue Actos 45 mg daily.    Orders:  -     POC Glycosylated Hemoglobin (Hb A1C)    2. Trigger index finger of right hand  -     Ambulatory Referral to Hand Surgery    3. Memory difficulties  Assessment & Plan:  Differential diagnosis includes Alzheimer's disease (FHx in mother), small vessel disease,  uncontrolled depression or sleep apnea.  We discussed multiple options for work-up including neuropsychological testing and sleep study.  I counseled him on a healthy diet and regular exercise to prevent worsening of small vessel disease.  Hypertension and diabetes is well controlled.  Discussed Neuropsych evaluation - information provided.               Follow Up     Return in about 6 months (around 8/21/2024) for Medicare Wellness.  Patient was given instructions and counseling regarding his condition or for health maintenance advice. Please see specific information pulled into the AVS if appropriate.

## 2024-04-22 DIAGNOSIS — E11.9 TYPE 2 DIABETES MELLITUS WITHOUT COMPLICATION, WITHOUT LONG-TERM CURRENT USE OF INSULIN: ICD-10-CM

## 2024-04-22 RX ORDER — PIOGLITAZONEHYDROCHLORIDE 45 MG/1
45 TABLET ORAL DAILY
Qty: 90 TABLET | Refills: 3 | Status: SHIPPED | OUTPATIENT
Start: 2024-04-22

## 2024-05-07 ENCOUNTER — TELEPHONE (OUTPATIENT)
Dept: FAMILY MEDICINE CLINIC | Facility: CLINIC | Age: 79
End: 2024-05-07

## 2024-05-07 NOTE — TELEPHONE ENCOUNTER
[facility]  [address] , [place] [address] · ~[address]  [phone]

## 2024-05-07 NOTE — TELEPHONE ENCOUNTER
"  Caller: Danyel Dash \"Beto\"    Relationship: Self    Best call back number: [phone]     What is the best time to reach you: ANY     Who are you requesting to speak with (clinical staff, provider,  specific staff member): PCP OR CLINICAL    Do you know the name of the person who called: SHERIN    What was the call regarding: DR. LOZANO HAD REFERRED PATIENT TO [facility] FOR EVALUATION A WHILE BACK BUT INSURANCE WASN'T ACCEPTED.  DO YOU REMEMBER THE NAME OF THE PROVIDER HE WAS REFERRED TO AND THEIR PHONE NUMBER?  PLEASE CALL SHERIN WITH INFORMATION.    "

## 2024-05-28 ENCOUNTER — PRIOR AUTHORIZATION (OUTPATIENT)
Dept: FAMILY MEDICINE CLINIC | Facility: CLINIC | Age: 79
End: 2024-05-28
Payer: MEDICARE

## 2024-06-11 NOTE — TELEPHONE ENCOUNTER
----- Message from Anil Prakash MD sent at 6/11/2019  5:18 PM EDT -----  Tell him that his lab work shows that he has a normal CBC.  His iron level is normal which is good.  His blood sugar is mildly elevated at 121.  Please fax a copy of this lab work to his PCP.. [email or web address]. [email or web address]  
Returned pt's call advised per Dr Prakash that his lab work shows that he has a normal cbc.  His iron level is normal which is good.  His blood sugar is mildly elevated at 121.  Pt verb understanding.   
VM to pt with request to contact office.    Labs faxed via epic to [facility] Re[facility].  
Pt appears forgetful; mild cognitive impairment noted.  Pt lives with her family. Reports decreased appetite/PO intake x months. No dietary restrictions PTA. Per flow sheets, PO intake 26-75% for documented meals during admission. Denies any N/V/D/C. Pt appears edentulous; amenable to easy to chew consistency. Does not like Ensure, however amenable to trying [email or web address] Standard supplement for additional nutrition.  Pt acknowledges wt loss, however unable to quantify, does not know UBW. Most recent wt noted as 55.5 kg, which makes BMI=21, not 17.2.

## 2024-07-23 ENCOUNTER — TELEPHONE (OUTPATIENT)
Dept: FAMILY MEDICINE CLINIC | Facility: CLINIC | Age: 79
End: 2024-07-23
Payer: MEDICARE

## 2024-07-23 DIAGNOSIS — L98.9 SKIN LESION: Primary | ICD-10-CM

## 2024-07-23 NOTE — TELEPHONE ENCOUNTER
"Caller: Danyel Dash \"Beto\"    Relationship: Self    Best call back number: [phone]    What is the medical concern/diagnosis: WARTS ON ARMS AND KNOT    What specialty or service is being requested: DERMATOLOGY    Any additional details: PATIENT DID NOT HAVE A SPECIFIC PROVIDER IN MIND JUST SOMEONE CLOSE TO [facility] AND IN THE [facility] NETWORK. PLEASE ADVISE.      "

## 2024-07-23 NOTE — TELEPHONE ENCOUNTER
Referral has been placed to [facility].  I have requested first available.  They will call you with scheduling.

## 2024-08-21 ENCOUNTER — OFFICE VISIT (OUTPATIENT)
Dept: FAMILY MEDICINE CLINIC | Facility: CLINIC | Age: 79
End: 2024-08-21
Payer: MEDICARE

## 2024-08-21 VITALS
OXYGEN SATURATION: 95 % | BODY MASS INDEX: 36.58 KG/M2 | WEIGHT: 247 LBS | TEMPERATURE: 97.7 F | SYSTOLIC BLOOD PRESSURE: 132 MMHG | DIASTOLIC BLOOD PRESSURE: 84 MMHG | HEIGHT: 69 IN | HEART RATE: 60 BPM

## 2024-08-21 DIAGNOSIS — Z00.00 MEDICARE ANNUAL WELLNESS VISIT, SUBSEQUENT: Primary | ICD-10-CM

## 2024-08-21 DIAGNOSIS — R41.3 MEMORY DIFFICULTIES: ICD-10-CM

## 2024-08-21 DIAGNOSIS — E11.9 TYPE 2 DIABETES MELLITUS WITHOUT COMPLICATION, WITHOUT LONG-TERM CURRENT USE OF INSULIN: ICD-10-CM

## 2024-08-21 DIAGNOSIS — Z13.0 SCREENING FOR DEFICIENCY ANEMIA: ICD-10-CM

## 2024-08-21 DIAGNOSIS — F32.1 CURRENT MODERATE EPISODE OF MAJOR DEPRESSIVE DISORDER, UNSPECIFIED WHETHER RECURRENT: ICD-10-CM

## 2024-08-21 DIAGNOSIS — R06.83 SNORING: ICD-10-CM

## 2024-08-21 PROCEDURE — G0439 PPPS, SUBSEQ VISIT: HCPCS | Performed by: STUDENT IN AN ORGANIZED HEALTH CARE EDUCATION/TRAINING PROGRAM

## 2024-08-21 PROCEDURE — 3075F SYST BP GE 130 - 139MM HG: CPT | Performed by: STUDENT IN AN ORGANIZED HEALTH CARE EDUCATION/TRAINING PROGRAM

## 2024-08-21 PROCEDURE — 1170F FXNL STATUS ASSESSED: CPT | Performed by: STUDENT IN AN ORGANIZED HEALTH CARE EDUCATION/TRAINING PROGRAM

## 2024-08-21 PROCEDURE — 1159F MED LIST DOCD IN RCRD: CPT | Performed by: STUDENT IN AN ORGANIZED HEALTH CARE EDUCATION/TRAINING PROGRAM

## 2024-08-21 PROCEDURE — 1160F RVW MEDS BY RX/DR IN RCRD: CPT | Performed by: STUDENT IN AN ORGANIZED HEALTH CARE EDUCATION/TRAINING PROGRAM

## 2024-08-21 PROCEDURE — 3079F DIAST BP 80-89 MM HG: CPT | Performed by: STUDENT IN AN ORGANIZED HEALTH CARE EDUCATION/TRAINING PROGRAM

## 2024-08-21 PROCEDURE — 1126F AMNT PAIN NOTED NONE PRSNT: CPT | Performed by: STUDENT IN AN ORGANIZED HEALTH CARE EDUCATION/TRAINING PROGRAM

## 2024-08-21 RX ORDER — ESCITALOPRAM OXALATE 20 MG/1
20 TABLET ORAL EVERY MORNING
Qty: 90 TABLET | Refills: 1 | Status: SHIPPED | OUTPATIENT
Start: 2024-08-21

## 2024-08-21 NOTE — PROGRESS NOTES
Subjective   The ABCs of the Annual Wellness Visit  Medicare Wellness Visit      Danyel Dash is a 78 y.o. patient who presents for a Medicare Wellness Visit.    The following portions of the patient's history were reviewed and   updated as appropriate: allergies, current medications, past family history, past medical history, past social history, past surgical history, and problem list.    Compared to one year ago, the patient's physical   health is the same.  Compared to one year ago, the patient's mental   health is worse.    Recent Hospitalizations:  He was not admitted to the hospital during the last year.     Current Medical Providers:  Patient Care Team:  Agatha Charlton MD as PCP - General (Family Medicine)  Perlita Sandoval MD as Consulting Physician (Hematology and Oncology)  Anil Prakash MD as Referring Physician (Gastroenterology)  Danyel Valencia MD as Consulting Physician (Hematology and Oncology)    Outpatient Medications Prior to Visit   Medication Sig Dispense Refill    acetaminophen (TYLENOL) 325 MG tablet Take 2 tablets by mouth Every 6 (Six) Hours As Needed for Mild Pain.      albuterol (PROAIR RESPICLICK) 108 (90 Base) MCG/ACT inhaler Inhale 2 puffs.      amLODIPine (NORVASC) 10 MG tablet Take 1 tablet by mouth Daily. 90 tablet 3    Blood Gluc Meter Disp-Strips device Pt to monitor blood glucose two times daily 1 each 0    buPROPion XL (WELLBUTRIN XL) 300 MG 24 hr tablet Take 1 tablet by mouth Daily. 90 tablet 3    Cholecalciferol (VITAMIN D-3) 1000 UNITS capsule Take 1,000 Units by mouth Daily.      Diclofenac Sodium (VOLTAREN) 1 % gel gel       diphenhydrAMINE-acetaminophen (TYLENOL PM)  MG tablet per tablet Take 1 tablet by mouth As Needed.      ferrous gluconate (FERGON) 240 (27 FE) MG tablet 2 tablets.      fluticasone (Flonase) 50 MCG/ACT nasal spray 2 sprays into the nostril(s) as directed by provider Daily. 15.8 mL 3    Ibuprofen 3 %, Gabapentin 10 %, Baclofen 2 %,  lidocaine 4 %, Ketamine HCl 4 % Apply 1-2 g topically to the appropriate area as directed 3 (Three) to 4 (Four) times daily. 90 g 5    lisinopril (PRINIVIL,ZESTRIL) 10 MG tablet Take 1 tablet by mouth Daily. 90 tablet 3    loratadine (CLARITIN) 10 MG tablet 1 tablet.      pantoprazole (PROTONIX) 40 MG EC tablet Take 1 tablet by mouth Daily. 90 tablet 3    pioglitazone (ACTOS) 45 MG tablet Take 1 tablet by mouth Daily. 90 tablet 3    simvastatin (ZOCOR) 20 MG tablet Take 1 tablet by mouth Daily. 90 tablet 3    valACYclovir (VALTREX) 500 MG tablet Take 1 tablet by mouth Daily. 30 tablet 2    vitamin B-12 (CYANOCOBALAMIN) 1000 MCG tablet Take 1 tablet by mouth Daily.      vitamin C (ASCORBIC ACID) 500 MG tablet Take 1 tablet by mouth Daily.      escitalopram (Lexapro) 10 MG tablet Take 1 tablet by mouth Every Morning. 90 tablet 3    hydrOXYzine (ATARAX) 25 MG tablet Take 1 tablet by mouth 3 (Three) Times a Day As Needed for Anxiety. for anxiety 90 tablet 3     No facility-administered medications prior to visit.     No opioid medication identified on active medication list. I have reviewed chart for other potential  high risk medication/s and harmful drug interactions in the elderly.      Aspirin is not on active medication list.  Aspirin use is not indicated based on review of current medical condition/s. Risk of harm outweighs potential benefits.  .    Patient Active Problem List   Diagnosis    Anemia    Anxiety    Stage 3a chronic kidney disease    Type 2 diabetes mellitus, without long-term current use of insulin    Erectile dysfunction of nonorganic origin    Gastroesophageal reflux disease    Hyperlipidemia    Hypertension    Iron deficiency anemia    Special screening for malignant neoplasm of prostate    Diverticulosis    Seasonal allergies    H/O: gout    Osteoarthritis of knee    Renal lesion    Meniere's disease of both ears    History of shingles    Memory difficulties    Lumbar radiculopathy    DDD  "(degenerative disc disease), lumbar    Lumbar facet arthropathy    Greater trochanteric bursitis of right hip    Medicare annual wellness visit, subsequent     Advance Care Planning Advance Directive is not on file.  ACP discussion was held with the patient during this visit. Patient has an advance directive (not in EMR), copy requested.            Objective   Vitals:    24 1019   BP: 132/84   Pulse: 60   Temp: 97.7 °F (36.5 °C)   SpO2: 95%   Weight: 112 kg (247 lb)   Height: 175.3 cm (69\")       Estimated body mass index is 36.48 kg/m² as calculated from the following:    Height as of this encounter: 175.3 cm (69\").    Weight as of this encounter: 112 kg (247 lb).            Does the patient have evidence of cognitive impairment? Yes, Fhx of AD in mother. Worsening memory difficulty with trouble recalling. Labs normal for reversible causes. Does snore - recommend sleep study. Does take hydroxyzine which I recommended stopping - could be making recall worse. Will also refer to Neuro.                                                                                               Health  Risk Assessment    Smoking Status:  Social History     Tobacco Use   Smoking Status Former    Current packs/day: 0.00    Average packs/day: 1.5 packs/day for 40.0 years (60.0 ttl pk-yrs)    Types: Cigarettes    Start date:     Quit date:     Years since quittin.6    Passive exposure: Past   Smokeless Tobacco Former   Tobacco Comments    quit      Alcohol Consumption:  Social History     Substance and Sexual Activity   Alcohol Use No       Fall Risk Screen  STEADI Fall Risk Assessment was completed, and patient is at LOW risk for falls.Assessment completed on:2024    Depression Screenin/21/2024    10:26 AM   PHQ-2/PHQ-9 Depression Screening   Little Interest or Pleasure in Doing Things 0-->not at all   Feeling Down, Depressed or Hopeless 0-->not at all   PHQ-9: Brief Depression Severity Measure Score " 0     Health Habits and Functional and Cognitive Screenin/21/2024    10:25 AM   Functional & Cognitive Status   Do you have difficulty preparing food and eating? No   Do you have difficulty bathing yourself, getting dressed or grooming yourself? No   Do you have difficulty using the toilet? No   Do you have difficulty moving around from place to place? No   Do you have trouble with steps or getting out of a bed or a chair? No   Current Diet Well Balanced Diet   Dental Exam Up to date   Eye Exam Up to date   Exercise (times per week) 2 times per week   Current Exercises Include Walking   Do you need help using the phone?  No   Are you deaf or do you have serious difficulty hearing?  No   Do you need help to go to places out of walking distance? No   Do you need help shopping? No   Do you need help preparing meals?  No   Do you need help with housework?  No   Do you need help with laundry? No   Do you need help taking your medications? No   Do you need help managing money? No   Do you ever drive or ride in a car without wearing a seat belt? No   Have you felt unusual stress, anger or loneliness in the last month? No   Who do you live with? Spouse   If you need help, do you have trouble finding someone available to you? No   Have you been bothered in the last four weeks by sexual problems? No   Do you have difficulty concentrating, remembering or making decisions? No           Age-appropriate Screening Schedule:  Refer to the list below for future screening recommendations based on patient's age, sex and/or medical conditions. Orders for these recommended tests are listed in the plan section. The patient has been provided with a written plan.    Health Maintenance List  Health Maintenance   Topic Date Due    Pneumococcal Vaccine 65+ (1 of 2 - PCV) Never done    TDAP/TD VACCINES (1 - Tdap) Never done    ZOSTER VACCINE (1 of 2) Never done    RSV Vaccine - Adults (1 - 1-dose 60+ series) Never done    HEPATITIS C  SCREENING  Never done    ANNUAL WELLNESS VISIT  07/27/2023    URINE MICROALBUMIN  07/27/2023    DIABETIC EYE EXAM  10/12/2023    COVID-19 Vaccine (5 - 2023-24 season) 03/29/2024    HEMOGLOBIN A1C  08/21/2024    INFLUENZA VACCINE  08/01/2024    LIPID PANEL  11/20/2024    BMI FOLLOWUP  02/21/2025    COLORECTAL CANCER SCREENING  12/27/2026                                                                                                                                                CMS Preventative Services Quick Reference  Risk Factors Identified During Encounter  Depression/Dysphoria: Current medication adjusted.  Follow up visit planned.  Hearing Problem:  wears hearing aids  Immunizations Discussed/Encouraged: Tdap, Influenza, Shingrix, and COVID19  Dental Screening Recommended  Vision Screening Recommended    The above risks/problems have been discussed with the patient.  Pertinent information has been shared with the patient in the After Visit Summary.  An After Visit Summary and PPPS were made available to the patient.    Follow Up:   Next Medicare Wellness visit to be scheduled in 1 year.     Assessment & Plan  Medicare annual wellness visit, subsequent  Patient was counseled in regards to maintaining a healthy lifestyle, rich in whole grains, fruits and vegetables. Limit high saturated fats and processed sugars. Maintain an active lifestyle to promote overall health and well being.     Screening for deficiency anemia    Current moderate episode of major depressive disorder, unspecified whether recurrent  Patient's depression is a recurrent episode that is mild without psychosis. Depression is in partial remission and stable.    Plan:   Medication  dose was adjusted;  incr lexapro to 20mg daily. Stop hydroxyzine.    Type 2 diabetes mellitus without complication, without long-term current use of insulin  Diabetes is stable.   Continue current treatment regimen.  Diabetes will be reassessed in 6 months  Memory  difficulties    Snoring    Orders Placed This Encounter   Procedures    Comprehensive Metabolic Panel    ORDER: Hemoglobin A1c    CBC Auto Differential    TSH    Lipid Panel    MicroAlbumin, Urine, Random - Urine, Clean Catch    Ambulatory Referral to Sleep Medicine    Ambulatory Referral to Neurology     New Medications Ordered This Visit   Medications    escitalopram (LEXAPRO) 20 MG tablet     Sig: Take 1 tablet by mouth Every Morning.     Dispense:  90 tablet     Refill:  1          Follow Up:   No follow-ups on file.

## 2024-08-22 ENCOUNTER — TELEPHONE (OUTPATIENT)
Dept: FAMILY MEDICINE CLINIC | Facility: CLINIC | Age: 79
End: 2024-08-22
Payer: MEDICARE

## 2024-08-22 LAB
ALBUMIN SERPL-MCNC: 4.1 G/DL (ref 3.8–4.8)
ALP SERPL-CCNC: 54 IU/L (ref 44–121)
ALT SERPL-CCNC: 20 IU/L (ref 0–44)
AST SERPL-CCNC: 22 IU/L (ref 0–40)
BASOPHILS # BLD AUTO: 0.1 X10E3/UL (ref 0–0.2)
BASOPHILS NFR BLD AUTO: 1 %
BILIRUB SERPL-MCNC: 0.8 MG/DL (ref 0–1.2)
BUN SERPL-MCNC: 12 MG/DL (ref 8–27)
BUN/CREAT SERPL: 11 (ref 10–24)
CALCIUM SERPL-MCNC: 9.1 MG/DL (ref 8.6–10.2)
CHLORIDE SERPL-SCNC: 103 MMOL/L (ref 96–106)
CHOLEST SERPL-MCNC: 150 MG/DL (ref 100–199)
CO2 SERPL-SCNC: 22 MMOL/L (ref 20–29)
CREAT SERPL-MCNC: 1.13 MG/DL (ref 0.76–1.27)
EGFRCR SERPLBLD CKD-EPI 2021: 67 ML/MIN/1.73
EOSINOPHIL # BLD AUTO: 0.1 X10E3/UL (ref 0–0.4)
EOSINOPHIL NFR BLD AUTO: 2 %
ERYTHROCYTE [DISTWIDTH] IN BLOOD BY AUTOMATED COUNT: 12.8 % (ref 11.6–15.4)
GLOBULIN SER CALC-MCNC: 1.9 G/DL (ref 1.5–4.5)
GLUCOSE SERPL-MCNC: 140 MG/DL (ref 70–99)
HBA1C MFR BLD: 6.5 % (ref 4.8–5.6)
HCT VFR BLD AUTO: 47.7 % (ref 37.5–51)
HDLC SERPL-MCNC: 45 MG/DL
HGB BLD-MCNC: 15.3 G/DL (ref 13–17.7)
IMM GRANULOCYTES # BLD AUTO: 0 X10E3/UL (ref 0–0.1)
IMM GRANULOCYTES NFR BLD AUTO: 0 %
LDLC SERPL CALC-MCNC: 86 MG/DL (ref 0–99)
LYMPHOCYTES # BLD AUTO: 1 X10E3/UL (ref 0.7–3.1)
LYMPHOCYTES NFR BLD AUTO: 15 %
MCH RBC QN AUTO: 30.2 PG (ref 26.6–33)
MCHC RBC AUTO-ENTMCNC: 32.1 G/DL (ref 31.5–35.7)
MCV RBC AUTO: 94 FL (ref 79–97)
MICROALBUMIN UR-MCNC: 10 UG/ML
MONOCYTES # BLD AUTO: 0.5 X10E3/UL (ref 0.1–0.9)
MONOCYTES NFR BLD AUTO: 7 %
NEUTROPHILS # BLD AUTO: 4.8 X10E3/UL (ref 1.4–7)
NEUTROPHILS NFR BLD AUTO: 75 %
PLATELET # BLD AUTO: 288 X10E3/UL (ref 150–450)
POTASSIUM SERPL-SCNC: 4.5 MMOL/L (ref 3.5–5.2)
PROT SERPL-MCNC: 6 G/DL (ref 6–8.5)
RBC # BLD AUTO: 5.06 X10E6/UL (ref 4.14–5.8)
SODIUM SERPL-SCNC: 140 MMOL/L (ref 134–144)
TRIGL SERPL-MCNC: 103 MG/DL (ref 0–149)
TSH SERPL DL<=0.005 MIU/L-ACNC: 0.61 UIU/ML (ref 0.45–4.5)
VLDLC SERPL CALC-MCNC: 19 MG/DL (ref 5–40)
WBC # BLD AUTO: 6.4 X10E3/UL (ref 3.4–10.8)

## 2024-08-27 NOTE — TELEPHONE ENCOUNTER
[facility] Referral was placed 7/23/24 has been closed.

## 2024-09-04 ENCOUNTER — TELEPHONE (OUTPATIENT)
Dept: PAIN MEDICINE | Facility: CLINIC | Age: 79
End: 2024-09-04

## 2024-09-04 NOTE — TELEPHONE ENCOUNTER
"Caller: Danyel Dash \"Beto\"    Relationship to patient: Self    Best call back number:     Chief complaint: BACK     Type of visit: FUP INJECTION     Requested date: BEFORE 9/12       Additional notes:PATIENT HAS TO GO ON A TRIP ON 9/12 AND WOULD LIKE INJECTION BEFORE THEN   "

## 2024-09-05 NOTE — TELEPHONE ENCOUNTER
Spoke to patient's wife, patient will need to schedule an office visit prior to scheduling an injection and it is unlikely that patient will be able to get an injection prior to to his trip on 9/12/24.

## 2025-01-29 ENCOUNTER — OFFICE VISIT (OUTPATIENT)
Dept: FAMILY MEDICINE CLINIC | Facility: CLINIC | Age: 80
End: 2025-01-29
Payer: MEDICARE

## 2025-01-29 VITALS
OXYGEN SATURATION: 98 % | HEART RATE: 62 BPM | TEMPERATURE: 97.1 F | SYSTOLIC BLOOD PRESSURE: 108 MMHG | RESPIRATION RATE: 16 BRPM | DIASTOLIC BLOOD PRESSURE: 80 MMHG | BODY MASS INDEX: 34.07 KG/M2 | HEIGHT: 69 IN | WEIGHT: 230 LBS

## 2025-01-29 DIAGNOSIS — F41.8 DEPRESSION WITH ANXIETY: ICD-10-CM

## 2025-01-29 DIAGNOSIS — E11.9 TYPE 2 DIABETES MELLITUS WITHOUT COMPLICATION, WITHOUT LONG-TERM CURRENT USE OF INSULIN: Primary | ICD-10-CM

## 2025-01-29 DIAGNOSIS — G31.84 MILD COGNITIVE IMPAIRMENT: ICD-10-CM

## 2025-01-29 DIAGNOSIS — I10 PRIMARY HYPERTENSION: ICD-10-CM

## 2025-01-29 LAB
EXPIRATION DATE: ABNORMAL
HBA1C MFR BLD: 7.3 % (ref 4.5–5.7)
Lab: ABNORMAL

## 2025-01-29 PROCEDURE — 83036 HEMOGLOBIN GLYCOSYLATED A1C: CPT | Performed by: STUDENT IN AN ORGANIZED HEALTH CARE EDUCATION/TRAINING PROGRAM

## 2025-01-29 PROCEDURE — 99214 OFFICE O/P EST MOD 30 MIN: CPT | Performed by: STUDENT IN AN ORGANIZED HEALTH CARE EDUCATION/TRAINING PROGRAM

## 2025-01-29 PROCEDURE — 3079F DIAST BP 80-89 MM HG: CPT | Performed by: STUDENT IN AN ORGANIZED HEALTH CARE EDUCATION/TRAINING PROGRAM

## 2025-01-29 PROCEDURE — 1160F RVW MEDS BY RX/DR IN RCRD: CPT | Performed by: STUDENT IN AN ORGANIZED HEALTH CARE EDUCATION/TRAINING PROGRAM

## 2025-01-29 PROCEDURE — 1126F AMNT PAIN NOTED NONE PRSNT: CPT | Performed by: STUDENT IN AN ORGANIZED HEALTH CARE EDUCATION/TRAINING PROGRAM

## 2025-01-29 PROCEDURE — 1159F MED LIST DOCD IN RCRD: CPT | Performed by: STUDENT IN AN ORGANIZED HEALTH CARE EDUCATION/TRAINING PROGRAM

## 2025-01-29 PROCEDURE — 3074F SYST BP LT 130 MM HG: CPT | Performed by: STUDENT IN AN ORGANIZED HEALTH CARE EDUCATION/TRAINING PROGRAM

## 2025-01-29 PROCEDURE — 3051F HG A1C>EQUAL 7.0%<8.0%: CPT | Performed by: STUDENT IN AN ORGANIZED HEALTH CARE EDUCATION/TRAINING PROGRAM

## 2025-01-29 RX ORDER — DONEPEZIL HYDROCHLORIDE 5 MG/1
5 TABLET, FILM COATED ORAL DAILY
COMMUNITY
Start: 2024-12-12

## 2025-01-29 NOTE — PROGRESS NOTES
"Chief Complaint  Diabetes    Subjective        Danyel Dash presents to [facility]  History of Present Illness  79-year-old male with type 2 diabetes, CKD 3, depression/anxiety, hyperlipidemia, hypertension who presents for T2DM follow up and mild cognitive impairment.    DM2 -asymptomatic.  Well-controlled with Actos.    Since his last visit with me he has seen neuropsychology at [facility].  There was concern for possible Alzheimer's disease.  However it was discussed that his memory impairment may be multifactorial including some depressive symptomology, undiagnosed sleep apnea.    He was started on donepezil 5 mg daily.  MRI head recommended but was not obtained.  he was encouraged to obtain sleep study.      Objective   Vital Signs:  /80   Pulse 62   Temp 97.1 °F (36.2 °C)   Resp 16   Ht 175.3 cm (69.02\")   Wt 104 kg (230 lb)   SpO2 98%   BMI 33.95 kg/m²   Estimated body mass index is 33.95 kg/m² as calculated from the following:    Height as of this encounter: 175.3 cm (69.02\").    Weight as of this encounter: 104 kg (230 lb).            Physical Exam  Constitutional:       General: He is not in acute distress.  Eyes:      Conjunctiva/sclera: Conjunctivae normal.   Cardiovascular:      Rate and Rhythm: Normal rate and regular rhythm.   Pulmonary:      Effort: Pulmonary effort is normal. No respiratory distress.   Abdominal:      Palpations: Abdomen is soft.      Tenderness: There is no abdominal tenderness.   Neurological:      Mental Status: He is alert and oriented to person, place, and time.   Psychiatric:         Mood and Affect: Mood normal.         Behavior: Behavior normal.        Result Review :  The following data was reviewed by: Agatha Charlton MD on 01/29/2025:  Common labs          2/21/2024    11:50 8/21/2024    11:44 1/29/2025    14:42   Common Labs   Glucose  140     BUN  12     Creatinine  1.13     Sodium  140     Potassium  4.5     Chloride  103   "   Calcium  9.1     Total Protein  6.0     Albumin  4.1     Total Bilirubin  0.8     Alkaline Phosphatase  54     AST (SGOT)  22     ALT (SGPT)  20     WBC  6.4     Hemoglobin  15.3     Hematocrit  47.7     Platelets  288     Total Cholesterol  150     Triglycerides  103     HDL Cholesterol  45     LDL Cholesterol   86     Hemoglobin A1C 6.1  6.5  7.3    Microalbumin, Urine  10.0       Data reviewed : See HPI above           Assessment and Plan   Diagnoses and all orders for this visit:    1. Type 2 diabetes mellitus without complication, without long-term current use of insulin (Primary)  Assessment & Plan:  A1c is slightly elevated at 7.3%  Current regimen includes pioglitazone 45 mg daily  He is on a statin and ACE inhibitor.  Continue.  Recommend low sugar, low-carb diet with increased exercise.  Repeat A1c in 3 months.  If continues to be greater than 7%, consider additional medication.  Goal A1c preferably less than 7% but 7-7.5% may be acceptable based on age.    Orders:  -     POC Glycosylated Hemoglobin (Hb A1C)    2. Mild cognitive impairment  Assessment & Plan:  Differential diagnosis includes Alzheimer's disease (FHx in mother), small vessel disease, uncontrolled depression or sleep apnea.  He did see neuropsychology at [place] who agreed with above assessment and thinks that cognitive impairment is likely multifactorial.  MRI head recommended but he has not yet obtained this.  Sleep study was also recommended he has not done this as well.  He was started on 5 mg donepezil daily.  Continue.      3. Primary hypertension  Assessment & Plan:  Well-controlled today in the office with BP of 120/58.  Goal less than 130/80 given diabetes.  Continue lisinopril 10 mg daily and amlodipine 10 mg daily.      4. Depression with anxiety  Assessment & Plan:  Controlled with current regimen.  Continue Lexapro 20 mg daily to Wellbutrin 300 mg daily.               Follow Up   Return in about 3 months (around  4/29/2025).  Patient was given instructions and counseling regarding his condition or for health maintenance advice. Please see specific information pulled into the AVS if appropriate.

## 2025-02-05 PROBLEM — G31.84 MILD COGNITIVE IMPAIRMENT: Status: ACTIVE | Noted: 2022-12-16

## 2025-02-05 NOTE — ASSESSMENT & PLAN NOTE
Differential diagnosis includes Alzheimer's disease (FHx in mother), small vessel disease, uncontrolled depression or sleep apnea.  He did see neuropsychology at [place] who agreed with above assessment and thinks that cognitive impairment is likely multifactorial.  MRI head recommended but he has not yet obtained this.  Sleep study was also recommended he has not done this as well.  He was started on 5 mg donepezil daily.  Continue.

## 2025-02-05 NOTE — ASSESSMENT & PLAN NOTE
A1c is slightly elevated at 7.3%  Current regimen includes pioglitazone 45 mg daily  He is on a statin and ACE inhibitor.  Continue.  Recommend low sugar, low-carb diet with increased exercise.  Repeat A1c in 3 months.  If continues to be greater than 7%, consider additional medication.  Goal A1c preferably less than 7% but 7-7.5% may be acceptable based on age.

## 2025-04-09 ENCOUNTER — OFFICE VISIT (OUTPATIENT)
Dept: FAMILY MEDICINE CLINIC | Facility: CLINIC | Age: 80
End: 2025-04-09
Payer: MEDICARE

## 2025-04-09 VITALS
OXYGEN SATURATION: 94 % | HEIGHT: 69 IN | WEIGHT: 226 LBS | BODY MASS INDEX: 33.47 KG/M2 | TEMPERATURE: 98.7 F | SYSTOLIC BLOOD PRESSURE: 122 MMHG | HEART RATE: 68 BPM | DIASTOLIC BLOOD PRESSURE: 72 MMHG

## 2025-04-09 DIAGNOSIS — F41.8 DEPRESSION WITH ANXIETY: ICD-10-CM

## 2025-04-09 DIAGNOSIS — K21.9 GASTROESOPHAGEAL REFLUX DISEASE, UNSPECIFIED WHETHER ESOPHAGITIS PRESENT: ICD-10-CM

## 2025-04-09 DIAGNOSIS — G31.84 MILD COGNITIVE IMPAIRMENT: Primary | ICD-10-CM

## 2025-04-09 DIAGNOSIS — I10 PRIMARY HYPERTENSION: ICD-10-CM

## 2025-04-09 RX ORDER — PANTOPRAZOLE SODIUM 40 MG/1
40 TABLET, DELAYED RELEASE ORAL DAILY
Qty: 90 TABLET | Refills: 3 | Status: SHIPPED | OUTPATIENT
Start: 2025-04-09

## 2025-04-09 RX ORDER — CITALOPRAM HYDROBROMIDE 20 MG/1
20 TABLET ORAL DAILY
Qty: 90 TABLET | Refills: 0 | Status: SHIPPED | OUTPATIENT
Start: 2025-04-09

## 2025-04-09 RX ORDER — DONEPEZIL HYDROCHLORIDE 5 MG/1
5 TABLET, FILM COATED ORAL DAILY
Status: CANCELLED | OUTPATIENT
Start: 2025-04-09

## 2025-04-15 NOTE — PROGRESS NOTES
"Chief Complaint  Type 2 diabetes mellitus without complication, without long; Dementia (Pt has concerns for and shows signs of having dementia ); and Depression (Pt would like to discuss medication.)    Subjective        Danyel Dash presents to [facility]  History of Present Illness  79-year-old male with type 2 diabetes, CKD 3, depression/anxiety, hyperlipidemia, hypertension who presents for T2DM follow up and mild cognitive impairment.    DM2 -asymptomatic.  Well-controlled with Actos.    Follows with Dr. Garcia at [place] [facility].  Has been seen neuropsychology at [place].  There was concern for possible Alzheimer's disease.  However it was discussed that his memory impairment may be multifactorial including some depressive symptomology, undiagnosed sleep apnea.    He was started on donepezil 5 mg daily.  MRI head recommended but was not obtained.  he was encouraged to obtain sleep study.  He feels that he has noticed some worsening depression that he thinks may be related to acknowledgment of memory decline.  Currently taking Lexapro.    Takes PPI for GERD.  Request refill.    Blood pressure very well-controlled on current regimen.  Asymptomatic.      Objective   Vital Signs:  /72   Pulse 68   Temp 98.7 °F (37.1 °C)   Ht 175.3 cm (69\")   Wt 103 kg (226 lb)   SpO2 94%   BMI 33.37 kg/m²   Estimated body mass index is 33.37 kg/m² as calculated from the following:    Height as of this encounter: 175.3 cm (69\").    Weight as of this encounter: 103 kg (226 lb).      Physical Exam  Constitutional:       General: He is not in acute distress.  Eyes:      Conjunctiva/sclera: Conjunctivae normal.   Cardiovascular:      Rate and Rhythm: Normal rate and regular rhythm.   Pulmonary:      Effort: Pulmonary effort is normal. No respiratory distress.   Abdominal:      Palpations: Abdomen is soft.      Tenderness: There is no abdominal tenderness.   Neurological:      Mental Status: " He is alert and oriented to person, place, and time.   Psychiatric:         Mood and Affect: Mood normal.         Behavior: Behavior normal.        Result Review :  The following data was reviewed by: Agatha Charlton MD on 04/09/2025:  Common labs          8/21/2024    11:44 1/29/2025    14:42   Common Labs   Glucose 140     BUN 12     Creatinine 1.13     Sodium 140     Potassium 4.5     Chloride 103     Calcium 9.1     Albumin 4.1     Total Bilirubin 0.8     Alkaline Phosphatase 54     AST (SGOT) 22     ALT (SGPT) 20     WBC 6.4     Hemoglobin 15.3     Hematocrit 47.7     Platelets 288     Total Cholesterol 150     Triglycerides 103     HDL Cholesterol 45     LDL Cholesterol  86     Hemoglobin A1C 6.5  7.3    Microalbumin, Urine 10.0       Data reviewed : none           Assessment and Plan   Diagnoses and all orders for this visit:    1. Mild cognitive impairment (Primary)  Assessment & Plan:  Differential diagnosis includes Alzheimer's disease (FHx in mother), small vessel disease, uncontrolled depression or sleep apnea.  He did see neuropsychology at [place] who agreed with above assessment and thinks that cognitive impairment is likely multifactorial.  MRI head recommended but not yet obtained.  Other reversible causes ruled out.  He was started on 5 mg donepezil daily.  Continue.  Can consider addition of memantine.      2. Primary hypertension  Assessment & Plan:  Well-controlled today in the office with BP of 120/58.  Goal less than 130/80 given diabetes.  Continue lisinopril 10 mg daily and amlodipine 10 mg daily.      3. Depression with anxiety  Assessment & Plan:  Uncontrolled with current regimen.  Trigger - worsening memory/cognitive decline.  Change lexapro to celexa 20mg daily.  On donepezil but could consider adding memantine as well in near future.    Orders:  -     citalopram (CeleXA) 20 MG tablet; Take 1 tablet by mouth Daily.  Dispense: 90 tablet; Refill: 0    4. Gastroesophageal reflux disease,  unspecified whether esophagitis present  Assessment & Plan:  Continue pantoprazole 40 mg daily.    Orders:  -     pantoprazole (PROTONIX) 40 MG EC tablet; Take 1 tablet by mouth Daily.  Dispense: 90 tablet; Refill: 3             Follow Up   Return in about 4 months (around 8/9/2025) for Recheck.  Patient was given instructions and counseling regarding his condition or for health maintenance advice. Please see specific information pulled into the AVS if appropriate.

## 2025-04-15 NOTE — ASSESSMENT & PLAN NOTE
Uncontrolled with current regimen.  Trigger - worsening memory/cognitive decline.  Change lexapro to celexa 20mg daily.  On donepezil but could consider adding memantine as well in near future.

## 2025-04-16 NOTE — ASSESSMENT & PLAN NOTE
Differential diagnosis includes Alzheimer's disease (FHx in mother), small vessel disease, uncontrolled depression or sleep apnea.  He did see neuropsychology at [place] who agreed with above assessment and thinks that cognitive impairment is likely multifactorial.  MRI head recommended but not yet obtained.  Other reversible causes ruled out.  He was started on 5 mg donepezil daily.  Continue.  Can consider addition of memantine.

## 2025-07-13 ENCOUNTER — HOSPITAL ENCOUNTER (OUTPATIENT)
Facility: HOSPITAL | Age: 80
Discharge: HOME | End: 2025-07-13
Attending: EMERGENCY MEDICINE | Admitting: EMERGENCY MEDICINE
Payer: MEDICARE

## 2025-07-13 ENCOUNTER — APPOINTMENT (OUTPATIENT)
Dept: GENERAL RADIOLOGY | Facility: HOSPITAL | Age: 80
End: 2025-07-13
Payer: MEDICARE

## 2025-07-13 VITALS
OXYGEN SATURATION: 95 % | TEMPERATURE: 98.6 F | BODY MASS INDEX: 33.21 KG/M2 | WEIGHT: 232 LBS | HEIGHT: 70 IN | DIASTOLIC BLOOD PRESSURE: 65 MMHG | RESPIRATION RATE: 18 BRPM | SYSTOLIC BLOOD PRESSURE: 122 MMHG | HEART RATE: 77 BPM

## 2025-07-13 DIAGNOSIS — S39.012A STRAIN OF LUMBAR REGION, INITIAL ENCOUNTER: Primary | ICD-10-CM

## 2025-07-13 PROCEDURE — 25010000002 KETOROLAC TROMETHAMINE PER 15 MG: Performed by: PHYSICIAN ASSISTANT

## 2025-07-13 PROCEDURE — G0463 HOSPITAL OUTPT CLINIC VISIT: HCPCS | Performed by: PHYSICIAN ASSISTANT

## 2025-07-13 PROCEDURE — 72170 X-RAY EXAM OF PELVIS: CPT

## 2025-07-13 PROCEDURE — 72110 X-RAY EXAM L-2 SPINE 4/>VWS: CPT

## 2025-07-13 RX ORDER — LIDOCAINE 4 G/G
1 PATCH TOPICAL ONCE
Status: DISCONTINUED | OUTPATIENT
Start: 2025-07-13 | End: 2025-07-13 | Stop reason: HOSPADM

## 2025-07-13 RX ORDER — KETOROLAC TROMETHAMINE 30 MG/ML
30 INJECTION, SOLUTION INTRAMUSCULAR; INTRAVENOUS ONCE
Status: COMPLETED | OUTPATIENT
Start: 2025-07-13 | End: 2025-07-13

## 2025-07-13 RX ORDER — DIAZEPAM 2 MG/1
2 TABLET ORAL ONCE
Status: DISCONTINUED | OUTPATIENT
Start: 2025-07-13 | End: 2025-07-13

## 2025-07-13 RX ADMIN — LIDOCAINE 1 PATCH: 4 PATCH TOPICAL at 15:21

## 2025-07-13 RX ADMIN — KETOROLAC TROMETHAMINE 30 MG: 30 INJECTION INTRAMUSCULAR; INTRAVENOUS at 15:21

## 2025-07-13 NOTE — [facility] PROVIDER NOTE
Subjective   History of Present Illness  Patient is a 79-year-old gentleman whose wife had fallen yesterday.  He was trying to help her off the floor and started having pain in her lower back and lower left back.  No trauma to himself.  He denies IV drug use, fever, incontinence of bowel or bladder, saddle anesthesia, radiating pain into abdomen or legs, or Hematuria.  There was no trauma directly to his back.  He says today the pain is much better than it was yesterday.      Review of Systems   Gastrointestinal: Negative.    Genitourinary: Negative.    Musculoskeletal:  Positive for back pain.   Skin:  Negative for wound.   Neurological:  Negative for weakness and numbness.   All other systems reviewed and are negative.      Past Medical History:   Diagnosis Date    Anemia     Anxiety     Arthritis     Chronic kidney disease     CKD stage 3    Coronary artery disease     rheumatic fever at age 7    Diabetes mellitus     GERD (gastroesophageal reflux disease)     GI bleed     Hyperlipidemia     Hypertension     Low back pain     Peripheral neuropathy     Rheumatic fever     Rheumatic fever 12/3/2021    Pt states that his heart is only slightly enlarged and he spent several years in the  and has had a very active life.    Spinal stenosis, lumbar     Tear of medial meniscus of knee 11/27/2020    Unspecified hypertensive kidney disease with chronic kidney disease stage I through stage IV, or unspecified 3/30/2022    Last Assessment & Plan:  Formatting of this note might be different from the original. BP controlled on norvasc & ACEi; no changes today       No Known Allergies    Past Surgical History:   Procedure Laterality Date    CATARACT EXTRACTION Bilateral     CHOLECYSTECTOMY      CHOLECYSTECTOMY WITH INTRAOPERATIVE CHOLANGIOGRAM N/A 12/06/2021    Procedure: CHOLECYSTECTOMY LAPAROSCOPIC;  Surgeon: Didi Telles MD;  Location: [facility];  Service: General;  Laterality: N/A;    COLONOSCOPY N/A  12/27/2016    tics, IH, polyps    ENDOSCOPY N/A 12/27/2016    LA Grade B reflux esophagitis, erythematous mucosa in stomach    ENDOSCOPY N/A 07/07/2017    Two jejunal AVM's     ENTEROSCOPY SMALL BOWEL  07/07/2017    Procedure: ENTEROSCOPY SMALL BOWEL;  Surgeon: Anil Prakash MD;  Location: [facility] ENDOSCOPY;  Service:     EPIDURAL BLOCK      GALLBLADDER SURGERY N/A 02/2022    JOINT ASPIRATION AND/OR INJECTION Right 9/20/2023    Procedure: ARTHROCENTESIS, ASPIRATION AND/OR INJECTION MAJOR JOINT/BURSA;  Surgeon: Shania Warner MD;  Location: [facility];  Service: Pain Management;  Laterality: Right;    KNEE ARTHROPLASTY, PARTIAL REPLACEMENT Left 02/01/2021    KNEE ARTHROSCOPY Right     LUMBAR EPIDURAL INJECTION N/A 4/7/2023    Procedure: LUMBAR EPIDURAL 1ST VISIT;  Surgeon: Shania Warner MD;  Location: [facility];  Service: Pain Management;  Laterality: N/A;    LUMBAR EPIDURAL INJECTION Right 9/20/2023    Procedure: INTRALAMINAR LUMBAR EPIDURAL STEROID INJECTION L4-5 W/ RIGHT PARAMEDIAN APPROACH AND RIGHT TROCHANTERIC BURSA INJECTION  15543, [id number];  Surgeon: Shania Warner MD;  Location: [facility];  Service: Pain Management;  Laterality: Right;    SHOULDER ARTHROSCOPY Right     STEROID INJECTION HIP Right 4/7/2023    Procedure: GREATER TROCHANTERIC BURSA INJECTION RIGHT TO BE PERFORMED WITH LESI;  Surgeon: Shania Warner MD;  Location: [facility];  Service: Pain Management;  Laterality: Right;    TONSILLECTOMY         Family History   Problem Relation Age of Onset    Diverticulosis Father     Diverticulitis Father     Alzheimer's disease Mother     No Known Problems Sister        Social History     Socioeconomic History    Marital status:      Spouse name: Lea    Number of children: 3   Tobacco Use    Smoking status: Former     Current packs/day: 0.00     Average packs/day: 1.5 packs/day for 40.0 years (60.0 ttl pk-yrs)     Types: Cigarettes     Start date: 1960     Quit date: 2000      Years since quittin.5     Passive exposure: Past    Smokeless tobacco: Former    Tobacco comments:     quit    Vaping Use    Vaping status: Never Used   Substance and Sexual Activity    Alcohol use: No    Drug use: No    Sexual activity: Defer           Objective   Physical Exam  Vitals and nursing note reviewed.   Constitutional:       Appearance: Normal appearance.   Eyes:      Conjunctiva/sclera: Conjunctivae normal.   Cardiovascular:      Rate and Rhythm: Normal rate.   Pulmonary:      Effort: Pulmonary effort is normal.   Abdominal:      General: There is distension.      Tenderness: There is no right CVA tenderness or left CVA tenderness.   Musculoskeletal:         General: No swelling or tenderness. Normal range of motion.      Cervical back: Normal range of motion.        Back:       Comments: Area of pain   Skin:     General: Skin is warm and dry.      Findings: No erythema or rash.   Neurological:      General: No focal deficit present.      Mental Status: He is alert.      Gait: Gait normal.   Psychiatric:         Mood and Affect: Mood normal.         Behavior: Behavior normal.         Procedures           ED Course  ED Course as of 25 1618   Sun 2025   1548 XR Spine Lumbar Complete 4+VW [AN]      ED Course User Index  [AN] Moira Martinez, RUBIN                                           Medical Decision Making  Presentation patient is walking slowly but steadily.  Good strength in legs.  Negative leg raise.  Back is not tender.  He hurts mostly when he twists his left.  There was no trauma directly on the spine.  I ordered x-ray of lumbar spine and pelvis with NAD.  I discussed the arthritis of the hips and the decreased spacing of the vertebrae.    He is feeling much better with Toradol and lidocaine patch.  They can order lidocaine patches over-the-counter and I feel comfortable taking over-the-counter medication such as Tylenol or Motrin.  I gave them information for heat and cold  therapy.  He said there is possible vacation in a couple days and he does not feel like he could drive.  He did ask for a note saying that he had been here so that he can cancel his vacation plans.    He send doctor follow-up and will return to emergency room fizzing neurological symptoms.  At this time he is medically cleared.  Most likely diagnosis is strain of the lumbar region.  X-ray did not see fractures.  Low suspicion for cauda equina or vertebral abscess.    --------------------            07/13/25               1457     --------------------   BP:       122/65     Pulse:               Resp:                Temp:                SpO2:      95%      --------------------    HISTORY: Low back injury, pain.     COMPARISON: CT abdomen pelvis 12/5/2021     FINDINGS:       5 views of the lumbar spine were obtained. There is a normal lumbar  lordosis. No abnormalities of alignment are identified. Vertebral body  heights are maintained. There is mild disc height loss at a few levels.  There are degenerative osteophytes at a few levels along with lower  lumbar predominant facet osteoarthropathy.  There is calcific atherosclerosis.     A single view of the pelvis was obtained. No acute fracture or  malalignment is identified. There is bilateral hip osteoarthritis.     If there is persistent clinical concern for acute fracture or inability  to bear weight, further evaluation with dedicated CT or MRI would be  recommended.      Problems Addressed:  Strain of lumbar region, initial encounter: complicated acute illness or injury    Amount and/or Complexity of Data Reviewed  Radiology: ordered. Decision-making details documented in ED Course.    Risk  OTC drugs.  Prescription drug management.        Final diagnoses:   Strain of lumbar region, initial encounter       ED Disposition  ED Disposition       ED Disposition   Discharge    Condition   Stable    Comment   --               Agatha Charlton,  MD  [address] [id number]  [phone]    In 1 week           Medication List      No changes were made to your prescriptions during this visit.

## 2025-07-13 NOTE — Clinical Note
[facility]  [address]  [place] [id number]    Danyel Dash was seen and treated in our emergency department on 7/13/2025.  He may return to work on 07/21/2025.    Danyel Dash was evaluated in the emergency room for an injury he sustained 7/12/25. We have discussed treatment and follow up. I do not think he should drive until symptoms are greatly improve and he has been re-evaluated by his family doctor or a spine specialist.       Thank you for choosing [facility].    Moira Martinez PA-C

## 2025-07-14 DIAGNOSIS — F41.8 DEPRESSION WITH ANXIETY: ICD-10-CM

## 2025-07-14 RX ORDER — CITALOPRAM HYDROBROMIDE 20 MG/1
20 TABLET ORAL DAILY
Qty: 90 TABLET | Refills: 0 | Status: SHIPPED | OUTPATIENT
Start: 2025-07-14

## 2025-08-11 ENCOUNTER — OFFICE VISIT (OUTPATIENT)
Dept: FAMILY MEDICINE CLINIC | Facility: CLINIC | Age: 80
End: 2025-08-11
Payer: MEDICARE

## 2025-08-11 VITALS
OXYGEN SATURATION: 97 % | DIASTOLIC BLOOD PRESSURE: 78 MMHG | HEIGHT: 70 IN | HEART RATE: 74 BPM | WEIGHT: 221 LBS | SYSTOLIC BLOOD PRESSURE: 128 MMHG | TEMPERATURE: 97.3 F | BODY MASS INDEX: 31.64 KG/M2

## 2025-08-11 DIAGNOSIS — F41.8 DEPRESSION WITH ANXIETY: ICD-10-CM

## 2025-08-11 DIAGNOSIS — G31.84 MILD COGNITIVE IMPAIRMENT: ICD-10-CM

## 2025-08-11 DIAGNOSIS — Z13.0 SCREENING FOR DEFICIENCY ANEMIA: ICD-10-CM

## 2025-08-11 DIAGNOSIS — E11.9 TYPE 2 DIABETES MELLITUS WITHOUT COMPLICATION, WITHOUT LONG-TERM CURRENT USE OF INSULIN: Primary | ICD-10-CM

## 2025-08-11 DIAGNOSIS — I10 PRIMARY HYPERTENSION: ICD-10-CM

## 2025-08-11 PROCEDURE — 1160F RVW MEDS BY RX/DR IN RCRD: CPT | Performed by: STUDENT IN AN ORGANIZED HEALTH CARE EDUCATION/TRAINING PROGRAM

## 2025-08-11 PROCEDURE — 99214 OFFICE O/P EST MOD 30 MIN: CPT | Performed by: STUDENT IN AN ORGANIZED HEALTH CARE EDUCATION/TRAINING PROGRAM

## 2025-08-11 PROCEDURE — G2211 COMPLEX E/M VISIT ADD ON: HCPCS | Performed by: STUDENT IN AN ORGANIZED HEALTH CARE EDUCATION/TRAINING PROGRAM

## 2025-08-11 PROCEDURE — 1159F MED LIST DOCD IN RCRD: CPT | Performed by: STUDENT IN AN ORGANIZED HEALTH CARE EDUCATION/TRAINING PROGRAM

## 2025-08-11 PROCEDURE — 3078F DIAST BP <80 MM HG: CPT | Performed by: STUDENT IN AN ORGANIZED HEALTH CARE EDUCATION/TRAINING PROGRAM

## 2025-08-11 PROCEDURE — 3074F SYST BP LT 130 MM HG: CPT | Performed by: STUDENT IN AN ORGANIZED HEALTH CARE EDUCATION/TRAINING PROGRAM

## 2025-08-11 PROCEDURE — 1126F AMNT PAIN NOTED NONE PRSNT: CPT | Performed by: STUDENT IN AN ORGANIZED HEALTH CARE EDUCATION/TRAINING PROGRAM

## 2025-08-11 RX ORDER — DONEPEZIL HYDROCHLORIDE 5 MG/1
5 TABLET, FILM COATED ORAL NIGHTLY
Qty: 90 TABLET | Refills: 0 | Status: SHIPPED | OUTPATIENT
Start: 2025-08-11

## 2025-08-11 RX ORDER — CITALOPRAM HYDROBROMIDE 20 MG/1
20 TABLET ORAL DAILY
Qty: 90 TABLET | Refills: 0 | Status: SHIPPED | OUTPATIENT
Start: 2025-08-11

## (undated) DEVICE — ANTIBACTERIAL UNDYED BRAIDED (POLYGLACTIN 910), SYNTHETIC ABSORBABLE SUTURE: Brand: COATED VICRYL

## (undated) DEVICE — CONTAINER,SPECIMEN,OR STERILE,4OZ: Brand: MEDLINE

## (undated) DEVICE — ENDOPATH XCEL BLADELESS TROCARS WITH STABILITY SLEEVES: Brand: ENDOPATH XCEL

## (undated) DEVICE — CATH IV INSYTE AUTOGARD 14G 1 1/2IN ORNG

## (undated) DEVICE — Device: Brand: PORTEX

## (undated) DEVICE — APC PROBE 2200 A, SINGLE USE OD 2.3MM/6.9FR; L. 2.2M/7.2FT: Brand: ERBE

## (undated) DEVICE — ENDOPATH XCEL UNIVERSAL TROCAR STABLILITY SLEEVES: Brand: ENDOPATH XCEL

## (undated) DEVICE — JACKSON-PRATT 100CC BULB RESERVOIR: Brand: CARDINAL HEALTH

## (undated) DEVICE — EPIDURAL TRAY: Brand: MEDLINE INDUSTRIES, INC.

## (undated) DEVICE — ADHS SKIN SURG TISS VISC PREMIERPRO EXOFIN HI/VISC FAST/DRY

## (undated) DEVICE — NDL SPINE 22G 5IN BLK

## (undated) DEVICE — GLV SURG TRIUMPH PF LTX 7 STRL

## (undated) DEVICE — Device: Brand: DEFENDO AIR/WATER/SUCTION AND BIOPSY VALVE

## (undated) DEVICE — GOWN ,SIRUS,NONREINFORCED SMALL: Brand: MEDLINE

## (undated) DEVICE — BITEBLOCK OMNI BLOC

## (undated) DEVICE — GLV SURG SENSICARE POLYISPRN W/ALOE PF LF 6.5 GRN STRL

## (undated) DEVICE — ENDOPOUCH RETRIEVER SPECIMEN RETRIEVAL BAGS: Brand: ENDOPOUCH RETRIEVER

## (undated) DEVICE — ENDOPATH XCEL BLUNT TIP TROCARS WITH SMOOTH SLEEVES: Brand: ENDOPATH XCEL

## (undated) DEVICE — DRP C/ARM 41X74IN

## (undated) DEVICE — Device

## (undated) DEVICE — DRAPE,REIN 53X77,STERILE: Brand: MEDLINE

## (undated) DEVICE — GLV SURG BIOGEL LTX PF 6

## (undated) DEVICE — APPL CHLORAPREP HI/LITE 26ML ORNG

## (undated) DEVICE — LOU LAP CHOLE: Brand: MEDLINE INDUSTRIES, INC.

## (undated) DEVICE — ENDOCUT SCISSOR TIP, DISPOSABLE: Brand: RENEW

## (undated) DEVICE — SOL NACL 0.9PCT 1000ML

## (undated) DEVICE — TUBING, SUCTION, 1/4" X 10', STRAIGHT: Brand: MEDLINE

## (undated) DEVICE — EXTENSION SET, MALE LUER LOCK ADAPTER WITH RETRACTABLE COLLAR

## (undated) DEVICE — NDL EPID TUOHY 18G 31/2IN

## (undated) DEVICE — CANN NASL CO2 TRULINK W/O2 A/

## (undated) DEVICE — NDL,TUOHY EPID,18GX3.5",PLASTIC STYLET: Brand: MEDLINE

## (undated) DEVICE — SUT VIC 0/0 UR6 27IN DYED J603H

## (undated) DEVICE — CATH CHOLANG 4.5F18IN BRGNDY

## (undated) DEVICE — VISUALIZATION SYSTEM: Brand: CLEARIFY

## (undated) DEVICE — STPCK 3WY D201 DISCOFIX

## (undated) DEVICE — GLV SURG TRIUMPH PF LTX 7.5 STRL